# Patient Record
Sex: FEMALE | Race: WHITE | NOT HISPANIC OR LATINO | Employment: OTHER | URBAN - NONMETROPOLITAN AREA
[De-identification: names, ages, dates, MRNs, and addresses within clinical notes are randomized per-mention and may not be internally consistent; named-entity substitution may affect disease eponyms.]

---

## 2021-01-27 ENCOUNTER — OFFICE VISIT CONVERTED (OUTPATIENT)
Dept: FAMILY MEDICINE CLINIC | Age: 61
End: 2021-01-27
Attending: FAMILY MEDICINE

## 2021-05-11 ENCOUNTER — OFFICE VISIT CONVERTED (OUTPATIENT)
Dept: FAMILY MEDICINE CLINIC | Age: 61
End: 2021-05-11
Attending: FAMILY MEDICINE

## 2021-05-18 ENCOUNTER — HOSPITAL ENCOUNTER (OUTPATIENT)
Dept: OTHER | Facility: HOSPITAL | Age: 61
Discharge: HOME | End: 2021-05-18
Attending: FAMILY MEDICINE

## 2021-05-18 NOTE — PROGRESS NOTES
Radha Herr  1960     Office/Outpatient Visit    Visit Date: Tue, May 11, 2021 01:25 pm    Provider: Swetha Krishna MD (Assistant: Mely Henriquez,  )    Location: [facility]        Electronically signed by Swetha Krishna MD on  05/12/2021 11:20:49 AM                             Subjective:        CC: PT NOT TAKING ALBUTEROL OR METHYLPREDISONEestablish care, PHYSICAL    HPI:       worsening allergies, she is on zyrtec and it is no longer working, she has a runny nose, stuffy nose, PND and sinus pressure.  She has tried and failed singulair/allegra/claritan in past      she has chronic allergies with asthma, she is on breo and doing well overall, she c/o chronic cough and is worried something else is going on with her chest      elevated BP when she sees the doctor, BP at home have been good, she is trying to lose weight and is starting to exercise daily,  and has lost 9 #s         She is having muscle spasms down her entire spine, onset after covid, she has tried OTC lidoderm patches, heating  pad and motrin prn and these all help.  No paresthesias/radiculopathy or h/LE weakness that is new has been noted.  She has chronic L shoulder pain and L UE weakness associated with a L shoulder injury years ago        She s/o bladder leakage and incontinence that onset a few months ago,  she is s/p 2 c sections and hysterectomy and oophrectomy in past.  Urine is clear and no odor          Dx with encounter for general adult medical examination without abnormal findings; her last physical exam was >5 years ago.  She is status-post hysterectomy.  She performs breast self-exams occasionally.   Her last Pap smear was in 2016 and was normal.   Her last mammogram was in 2016, was normal, and breast reduction 2017.   She has never had a dexa scan. She underwent colonoscopy 10 years ago with normal results.   She's had vision screening done <6 months ago, this was abnormal, but is corrected with  glasses, and Dr Clay-starting to get cataracts.   Preventative Health updated today.  She is not current with her Td and influenza immunization.  Ms. Herr denies any history of abnormal Pap smears.  Tobacco: She has never smoked.      ROS:     CONSTITUTIONAL:  Negative for chills and fever.      EYES:  Negative for blurred vision and eye pain.      E/N/T:  Positive for nasal congestion and frequent rhinorrhea.   Negative for ear pain, sore throat or loss of taste and smell.      CARDIOVASCULAR:  Positive for rib and shoulder pain.   Negative for chest pain, dizziness, palpitations, pedal edema or tachycardia.      RESPIRATORY:  Positive for recent cough.   Negative for dyspnea or frequent wheezing.      GASTROINTESTINAL:  Negative for abdominal pain, constipation, diarrhea, hematochezia, melena, nausea and vomiting.      GENITOURINARY:  Negative for dysuria and hematuria.      MUSCULOSKELETAL:  Negative for myalgias.      INTEGUMENTARY/BREAST:  Negative for rash.      NEUROLOGICAL:  Negative for dizziness, headaches and weakness.      PSYCHIATRIC:  Positive for feelings of stress.   Negative for anxiety, depression, sleep disturbance or suicidal thoughts.          Past Medical History / Family History / Social History:         Last Reviewed on 2021 02:36 PM by Swetha Krishna    Past Medical History:                 PAST MEDICAL HISTORY         allergy induced asthma     Allergies     white coat HTN         GYNECOLOGICAL HISTORY:        Menopause at age hysterectomy .          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          Immunizations:     None        Allergies:     Last Reviewed on 1/27/2021 08:59 AM by Vidhya Hernandez    No Known Allergies.        Current Medications:     Last Reviewed on 5/11/2021 01:29 PM by Mely Henriquez    ALBUTEROL SULFATE HFA  108 (90 Base) MCG/ACT AERS     BENZONATATE  100 MG CAPS     Breo Ellipta 200-25 mcg/dose Inhalation Blister, With Inhalation Device [INHALE ONE DOSE BY MOUTH DAILY AT THE SAME TIME EACH DAY]    C-500 500 mg oral tablet,chewable    CoQ-10 100 mg oral capsule    Dialyvite Vitamin D 125 mcg (5,000 unit) oral capsule    vitamin B12-folic acid 500-400 mcg oral tablet        Objective:        Vitals:         Current: 5/11/2021 1:29:32 PM    Ht:  5 ft, 5.5 in;  Wt: 273.4 lbs;  BMI: 44.8T: 96.8 F (temporal);  BP: 150/81 mm Hg (right arm, sitting);  P: 77 bpm (left arm (BP Cuff), sitting)        Repeat:     1:37:48 PM  BP:   146/79mm Hg (right arm, sitting, HR: 79)     Exams:     PHYSICAL EXAM:     GENERAL: vital signs recorded - moderately obese;  well groomed;  no apparent distress;     EYES: PERRL, EOMI     E/N/T: EARS: both TMs are have fluid behind them;  NOSE:  normal nasal mucosa, septum, turbinates, and sinuses; OROPHARYNX: posterior pharynx, including tonsils, tongue, and uvula are normal;     NECK: thyroid is non-palpable; carotid exam is normal with good upstroke and no bruits; supple, FROM;     RESPIRATORY: CTA B WITHOUT WHEEZING/RALES OR RHONCHI, NORMAL RESP. EFFORT     CARDIOVASCULAR: regular rate and rhythm; normal S1, S2; no murmur, rub, or gallop; normal PMI;     NEUROLOGIC: mental status: oriented to person, place, and time;   GROSSLY INTACT     PSYCHIATRIC:  appropriate affect and demeanor; normal speech pattern; grossly normal memory;         Assessment:         J30.9   Allergic rhinitis, unspecified       J45.21   Mild intermittent asthma with (acute) exacerbation       R03.0   Elevated blood-pressure reading, without diagnosis of hypertension       N39.490   Overflow incontinence       M54.9   Dorsalgia, unspecified       R05   Cough       Z00.00   Encounter for general adult medical examination without abnormal findings       Z12.31   Encounter for screening mammogram for malignant neoplasm of breast       Z78.0   Asymptomatic menopausal state       Z12.11   Encounter for screening for malignant neoplasm of colon           ORDERS:         Meds Prescribed:       [Recorded] levocetirizine 5 mg oral tablet [take 1 tablet (5 mg) by oral route once daily at bedtime]       [Refilled] Breo Ellipta 200-25 mcg/dose Inhalation Blister, With Inhalation Device [INHALE ONE DOSE BY MOUTH DAILY AT THE SAME TIME EACH DAY], #90 (ninety) packets, Refills: 1 (one)       [Refilled] levocetirizine 5 mg oral tablet [take 1 tablet (5 mg) by oral route once daily at bedtime], #90 (ninety) tablets, Refills: 1 (one)         Radiology/Test Orders:       [id number]  DXA, bone density study, 1 or more sites; axial skeleton (eg hips, pelvis, spine)  (Send-Out)            12572  Screening digital breast tomosynthesis bi  (Send-Out)            65150  Radiologic exam chest 2 views  (Send-Out)              Lab Orders:       [id number]  [facility] PHYSICAL: CMP, CBC, TSH, LIPID: [id number], [id number], [id number], [id number]  (Send-Out)            17937  [facility] Urinalysis, automated, with micro  (Send-Out)              Procedures Ordered:       REFER  Referral to Specialist or Other Facility   (Send-Out)                      Plan:         Allergic rhinitis, unspecifiedwill change her to xyzal          Prescriptions:       [Recorded] levocetirizine 5 mg oral tablet [take 1 tablet (5 mg) by  oral route once daily at bedtime]       [Refilled] Breo Ellipta 200-25 mcg/dose Inhalation Blister, With Inhalation Device [INHALE ONE DOSE BY MOUTH DAILY AT THE SAME TIME EACH DAY], #90 (ninety) packets, Refills: 1 (one)       [Refilled] levocetirizine 5 mg oral tablet [take 1 tablet (5 mg) by oral route once daily at bedtime], #90 (ninety) tablets, Refills: 1 (one)         Mild intermittent asthma with (acute) exacerbationstable on meds, breo refilled        Elevated blood-pressure reading, without diagnosis of hypertensioncont to monitor home BP closely        Overflow incontinencewill check urine and have her cont to work on wt loss, start kegel exercise and bladder training    LABORATORY:  Labs ordered to be performed today include urinalysis with micro.            Orders:       [id number]  [facility] Urinalysis, automated, with micro  (Send-Out)              Dorsalgia, unspecifiedshe defers muscle relaxant, will cont motrin sparingly and heating pad        Coughchronic, prob allergies, will recheck CXR        RADIOLOGY:  I have ordered a chest x-ray (PA and lateral) to be done today.            Orders:       [id number]  Radiologic exam chest 2 views  (Send-Out)              Encounter for general adult medical examination without abnormal findingsSHE NEEDS HER MAMMO/DEXA/COLONOSCOPY, recommend covid/tdap/shingrix vaccinations, recommend yearly eye exam and flu vaccination, recommend diet and exercise for weight.    LABORATORY:  Labs ordered to be performed today include PHYSICAL PANEL; CMP, CBC, TSH, LIPID.            Orders:       [id number]  [facility] PHYSICAL: CMP, CBC, TSH, LIPID: [id number], [id number], [id number], [id number]  (Send-Out)            REFER  Referral to Specialist or Other Facility   (Send-Out)      Dr. Joya        Encounter for screening mammogram for malignant neoplasm of breast          Orders:       10404  Screening digital breast tomosynthesis bi  (Send-Out)              Asymptomatic menopausal state           Orders:       23606  DXA, bone density study, 1 or more sites; axial skeleton (eg hips, pelvis, spine)  (Send-Out)                  Charge Capture:         Primary Diagnosis:     J30.9  Allergic rhinitis, unspecified           Orders:      [id number]  Office/outpatient visit; established patient, level 4  (In-House)              J45.21  Mild intermittent asthma with (acute) exacerbation     R03.0  Elevated blood-pressure reading, without diagnosis of hypertension     N39.490  Overflow incontinence     M54.9  Dorsalgia, unspecified     R05  Cough     Z00.00  Encounter for general adult medical examination without abnormal findings           Orders:      13941  Preventive medicine, established patient, age 40-64 years  (In-House)              Z12.31  Encounter for screening mammogram for malignant neoplasm of breast     Z78.0  Asymptomatic menopausal state     Z12.11  Encounter for screening for malignant neoplasm of colon

## 2021-05-18 NOTE — PROGRESS NOTES
Radha Herr  1960     Office/Outpatient Visit    Visit Date: Wed, Jan 27, 2021 08:56 am    Provider: Swetha Krishna MD (Assistant: Vidhya Hernandez, )    Location: [facility]        Electronically signed by Swetha Krishna MD on  01/31/2021 02:01:48 PM                             Subjective:        CC: wheezing    HPI:           Ms. Herr presents with cough.  This has been a problem for the past one month.  It is associated with dyspnea, nasal congestion, post nasal drip and wheezing.  There are no associated symptoms of acid reflux or chest pain.  It seems worse with exertion, talking, and laughing.  The cough is lessened with cough syrup and antihistamines.  A respiratory infection preceeded the cough.  She does not use tobacco products and is not exposed to passive cigarette smoke.  Her medical history is remarkable for allergies and allergy induced asthma.  dx with covid one month ago Dec 17th-treated with muccinex and allergy meds, when she didnt get better (8 days later) she had a CXR showing pnumonia and she was treated with antibiotic and steroid at that time, she improved but cough came back and she was seen on Jan 19th and she was treated with albuterol and she is still wheezing and feels bad     ROS:     CONSTITUTIONAL:  Negative for chills and fever.      EYES:  Negative for blurred vision and eye pain.      E/N/T:  Positive for nasal congestion, frequent rhinorrhea and loss of taste and smell.   Negative for ear pain or sore throat.      CARDIOVASCULAR:  Positive for rib and shoulder pain.   Negative for chest pain, dizziness, palpitations, pedal edema or tachycardia.      RESPIRATORY:  Positive for recent cough and dyspnea.      GASTROINTESTINAL:  Negative for abdominal pain, constipation, diarrhea, nausea and vomiting.      GENITOURINARY:  Negative for dysuria and hematuria.      MUSCULOSKELETAL:  Negative for myalgias.      INTEGUMENTARY/BREAST:  Negative for rash.       NEUROLOGICAL:  Positive for headaches.   Negative for dizziness or weakness.          Past Medical History / Family History / Social History:         Last Reviewed on 2021 09:39 AM by Swetha Krishna    Past Medical History:                 PAST MEDICAL HISTORY         allergy induced asthma     Allergies     white coat HTN         GYNECOLOGICAL HISTORY:        Menopause at age hysterectomy 1990s.          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          Current Medications:     None        Objective:        Vitals:         Current: 2021 9:03:50 AM    Ht:  5 ft, 5.5 in;  Wt: 282.6 lbs;  BMI: 46.3T: 98.2 F (oral);  BP: 171/105 mm Hg (left arm, sitting);  P: 93 bpm (left arm (BP Cuff), sitting)O2 Sat: 97 % (room air)        Repeat:     9:5:16 AM  BP:   167/122mm Hg (right arm, sitting)     Exams:     PHYSICAL EXAM:     GENERAL: vital signs recorded - moderately obese;  well groomed;  no apparent distress;     EYES: PERRL, EOMI     E/N/T: EARS: both TMs are have fluid behind them;  NOSE:  normal nasal mucosa, septum, turbinates, and sinuses; OROPHARYNX: posterior pharynx, including tonsils, tongue, and uvula are normal;     NECK: thyroid is non-palpable; carotid exam is normal with  good upstroke and no bruits; supple, FROM;     RESPIRATORY: CTA B WITHOUT WHEEZING/RALES OR RHONCHI, NORMAL RESP. EFFORT     CARDIOVASCULAR: regular rate and rhythm; normal S1, S2; no murmur, rub, or gallop; normal PMI;     NEUROLOGIC: mental status: oriented to person, place, and time;  GROSSLY INTACT     PSYCHIATRIC:  appropriate affect and demeanor; normal speech pattern; grossly normal memory;         Assessment:         R05   Cough       U07.1   COVID-19       J45.21   Mild intermittent asthma with (acute) exacerbation       J30.9   Allergic rhinitis, unspecified       I10   Essential (primary) hypertension           ORDERS:         Meds Prescribed:       [Recorded] Breo Ellipta 200-25 mcg/dose Inhalation Blister, With Inhalation Device [inhale 1 puff by inhalation route once daily at the same time each day]       [Recorded] methylPREDNISolone 4 mg oral Tablet, Dose Pack [take by oral route as directed per package instructions]       [Refilled] Breo Ellipta 200-25 mcg/dose Inhalation Blister, With Inhalation Device [inhale 1 puff by inhalation route once daily at the same time each day], #28 (twenty eight) blisters, Refills: 0 (zero)       [Refilled] methylPREDNISolone 4 mg oral Tablet, Dose Pack [take by oral route as directed per package instructions], #21 (twenty one) tablets, Refills: 0 (zero)                 Plan:         Coughs/p covid, due to asthma exacerbation from her allergies and recent covid exposure        COVID-19resolved with residual persistent        Mild intermittent asthma with (acute) exacerbationongoing, will start her on breo and give her another steroid hetal          Prescriptions:       [Recorded] Breo Ellipta 200-25 mcg/dose Inhalation Blister, With Inhalation Device [inhale 1 puff by inhalation route once daily at the same time each day]       [Recorded] methylPREDNISolone 4 mg oral Tablet, Dose Pack [take by oral route as directed per package instructions]       [Refilled] Breo  Ellipta 200-25 mcg/dose Inhalation Blister, With Inhalation Device [inhale 1 puff by inhalation route once daily at the same time each day], #28 (twenty eight) blisters, Refills: 0 (zero)       [Refilled] methylPREDNISolone 4 mg oral Tablet, Dose Pack [take by oral route as directed per package instructions], #21 (twenty one) tablets, Refills: 0 (zero)         Allergic rhinitis, unspecifiedcont current meds, add muccinex and steroid        Essential (primary) hypertensionshe is to continue to check her BP at home, lst home bp was 120/70's            Charge Capture:         Primary Diagnosis:     R05  Cough           Orders:      57820  Office visit - new pt, level 3  (In-House)              U07.1  COVID-19     J45.21  Mild intermittent asthma with (acute) exacerbation     J30.9  Allergic rhinitis, unspecified     I10  Essential (primary) hypertension

## 2021-07-02 VITALS
TEMPERATURE: 98.2 F | OXYGEN SATURATION: 97 % | DIASTOLIC BLOOD PRESSURE: 122 MMHG | BODY MASS INDEX: 45.42 KG/M2 | SYSTOLIC BLOOD PRESSURE: 167 MMHG | HEIGHT: 66 IN | WEIGHT: 282.6 LBS | HEART RATE: 93 BPM

## 2021-07-02 VITALS
SYSTOLIC BLOOD PRESSURE: 146 MMHG | TEMPERATURE: 96.8 F | BODY MASS INDEX: 43.94 KG/M2 | WEIGHT: 273.4 LBS | HEIGHT: 66 IN | HEART RATE: 77 BPM | DIASTOLIC BLOOD PRESSURE: 79 MMHG

## 2021-07-07 ENCOUNTER — LAB (OUTPATIENT)
Dept: LAB | Facility: HOSPITAL | Age: 61
End: 2021-07-07

## 2021-07-07 ENCOUNTER — TRANSCRIBE ORDERS (OUTPATIENT)
Dept: ADMINISTRATIVE | Facility: HOSPITAL | Age: 61
End: 2021-07-07

## 2021-07-07 DIAGNOSIS — Z00.00 ROUTINE GENERAL MEDICAL EXAMINATION AT A HEALTH CARE FACILITY: ICD-10-CM

## 2021-07-07 DIAGNOSIS — N39.490 OVERFLOW INCONTINENCE: ICD-10-CM

## 2021-07-07 DIAGNOSIS — N39.490 OVERFLOW INCONTINENCE: Primary | ICD-10-CM

## 2021-07-07 LAB
ALBUMIN SERPL-MCNC: 4.5 G/DL (ref 3.5–5.2)
ALBUMIN/GLOB SERPL: 1.8 G/DL
ALP SERPL-CCNC: 64 U/L (ref 39–117)
ALT SERPL W P-5'-P-CCNC: 28 U/L (ref 1–33)
ANION GAP SERPL CALCULATED.3IONS-SCNC: 6.8 MMOL/L (ref 5–15)
AST SERPL-CCNC: 19 U/L (ref 1–32)
BASOPHILS # BLD MANUAL: 0.14 10*3/MM3 (ref 0–0.2)
BASOPHILS NFR BLD AUTO: 3 % (ref 0–1.5)
BILIRUB SERPL-MCNC: 0.5 MG/DL (ref 0–1.2)
BILIRUB UR QL STRIP: NEGATIVE
BUN SERPL-MCNC: 17 MG/DL (ref 6–20)
BUN/CREAT SERPL: 24.3 (ref 7–25)
CALCIUM SPEC-SCNC: 8.9 MG/DL (ref 8.6–10.5)
CHLORIDE SERPL-SCNC: 101 MMOL/L (ref 98–107)
CHOLEST SERPL-MCNC: 194 MG/DL (ref 0–200)
CLARITY UR: ABNORMAL
CO2 SERPL-SCNC: 28.2 MMOL/L (ref 22–29)
COLOR UR: YELLOW
CREAT SERPL-MCNC: 0.7 MG/DL (ref 0.57–1)
DEPRECATED RDW RBC AUTO: 43.3 FL (ref 37–54)
EOSINOPHIL # BLD MANUAL: 0.09 10*3/MM3 (ref 0–0.4)
EOSINOPHIL # BLD MANUAL: 0.23 10*3/MM3 (ref 0–0.4)
EOSINOPHIL NFR BLD MANUAL: 2 % (ref 0.3–6.2)
EOSINOPHIL NFR BLD MANUAL: 5 % (ref 0.3–6.2)
ERYTHROCYTE [DISTWIDTH] IN BLOOD BY AUTOMATED COUNT: 13.1 % (ref 12.3–15.4)
GFR SERPL CREATININE-BSD FRML MDRD: 86 ML/MIN/1.73
GLOBULIN UR ELPH-MCNC: 2.5 GM/DL
GLUCOSE SERPL-MCNC: 94 MG/DL (ref 65–99)
GLUCOSE UR STRIP-MCNC: NEGATIVE MG/DL
HCT VFR BLD AUTO: 46 % (ref 34–46.6)
HDLC SERPL-MCNC: 53 MG/DL (ref 40–60)
HGB BLD-MCNC: 14.8 G/DL (ref 12–15.9)
HGB UR QL STRIP.AUTO: NEGATIVE
HOLD SPECIMEN: NORMAL
KETONES UR QL STRIP: NEGATIVE
LDLC SERPL CALC-MCNC: 118 MG/DL (ref 0–100)
LDLC/HDLC SERPL: 2.18 {RATIO}
LEUKOCYTE ESTERASE UR QL STRIP.AUTO: NEGATIVE
LYMPHOCYTES # BLD MANUAL: 1.16 10*3/MM3 (ref 0.7–3.1)
LYMPHOCYTES # BLD MANUAL: 1.64 10*3/MM3 (ref 0.7–3.1)
LYMPHOCYTES NFR BLD MANUAL: 24 % (ref 19.6–45.3)
LYMPHOCYTES NFR BLD MANUAL: 3 % (ref 5–12)
LYMPHOCYTES NFR BLD MANUAL: 35.4 % (ref 19.6–45.3)
LYMPHOCYTES NFR BLD MANUAL: 5 % (ref 5–12)
MCH RBC QN AUTO: 28.8 PG (ref 26.6–33)
MCHC RBC AUTO-ENTMCNC: 32.2 G/DL (ref 31.5–35.7)
MCV RBC AUTO: 89.7 FL (ref 79–97)
MONOCYTES # BLD AUTO: 0.14 10*3/MM3 (ref 0.1–0.9)
MONOCYTES # BLD AUTO: 0.23 10*3/MM3 (ref 0.1–0.9)
NEUTROPHILS # BLD AUTO: 2.61 10*3/MM3 (ref 1.7–7)
NEUTROPHILS # BLD AUTO: 3 10*3/MM3 (ref 1.7–7)
NEUTROPHILS NFR BLD MANUAL: 56.6 % (ref 42.7–76)
NEUTROPHILS NFR BLD MANUAL: 65 % (ref 42.7–76)
NITRITE UR QL STRIP: NEGATIVE
PH UR STRIP.AUTO: 7 [PH] (ref 5–8)
PLAT MORPH BLD: NORMAL
PLAT MORPH BLD: NORMAL
PLATELET # BLD AUTO: 226 10*3/MM3 (ref 140–450)
PMV BLD AUTO: 11.9 FL (ref 6–12)
POTASSIUM SERPL-SCNC: 4 MMOL/L (ref 3.5–5.2)
PROT SERPL-MCNC: 7 G/DL (ref 6–8.5)
PROT UR QL STRIP: NEGATIVE
RBC # BLD AUTO: 5.13 10*6/MM3 (ref 3.77–5.28)
RBC MORPH BLD: NORMAL
RBC MORPH BLD: NORMAL
SODIUM SERPL-SCNC: 136 MMOL/L (ref 136–145)
SP GR UR STRIP: 1.02 (ref 1–1.03)
TRIGL SERPL-MCNC: 128 MG/DL (ref 0–150)
TSH SERPL DL<=0.05 MIU/L-ACNC: 1.52 UIU/ML (ref 0.27–4.2)
UROBILINOGEN UR QL STRIP: ABNORMAL
VARIANT LYMPHS NFR BLD MANUAL: 1 % (ref 0–5)
VLDLC SERPL-MCNC: 23 MG/DL (ref 5–40)
WBC # BLD AUTO: 4.62 10*3/MM3 (ref 3.4–10.8)
WBC MORPH BLD: NORMAL
WBC MORPH BLD: NORMAL

## 2021-07-07 PROCEDURE — 80053 COMPREHEN METABOLIC PANEL: CPT

## 2021-07-07 PROCEDURE — 80061 LIPID PANEL: CPT

## 2021-07-07 PROCEDURE — 85027 COMPLETE CBC AUTOMATED: CPT

## 2021-07-07 PROCEDURE — 36415 COLL VENOUS BLD VENIPUNCTURE: CPT

## 2021-07-07 PROCEDURE — 81003 URINALYSIS AUTO W/O SCOPE: CPT

## 2021-07-07 PROCEDURE — 84443 ASSAY THYROID STIM HORMONE: CPT

## 2021-07-07 PROCEDURE — 85007 BL SMEAR W/DIFF WBC COUNT: CPT

## 2021-07-15 ENCOUNTER — OFFICE VISIT (OUTPATIENT)
Dept: FAMILY MEDICINE CLINIC | Age: 61
End: 2021-07-15

## 2021-07-15 VITALS
SYSTOLIC BLOOD PRESSURE: 173 MMHG | BODY MASS INDEX: 40.21 KG/M2 | WEIGHT: 250.2 LBS | HEART RATE: 62 BPM | TEMPERATURE: 98.4 F | DIASTOLIC BLOOD PRESSURE: 76 MMHG | HEIGHT: 66 IN

## 2021-07-15 DIAGNOSIS — J30.1 ALLERGIC RHINITIS DUE TO POLLEN, UNSPECIFIED SEASONALITY: ICD-10-CM

## 2021-07-15 DIAGNOSIS — I10 WHITE COAT SYNDROME WITH DIAGNOSIS OF HYPERTENSION: ICD-10-CM

## 2021-07-15 DIAGNOSIS — N39.46 MIXED STRESS AND URGE URINARY INCONTINENCE: ICD-10-CM

## 2021-07-15 DIAGNOSIS — Z12.11 ENCOUNTER FOR SCREENING COLONOSCOPY: ICD-10-CM

## 2021-07-15 DIAGNOSIS — Z01.419 GYNECOLOGIC EXAM NORMAL: Primary | ICD-10-CM

## 2021-07-15 PROCEDURE — 99214 OFFICE O/P EST MOD 30 MIN: CPT | Performed by: FAMILY MEDICINE

## 2021-07-15 RX ORDER — TOLTERODINE 2 MG/1
2 CAPSULE, EXTENDED RELEASE ORAL DAILY
Qty: 90 CAPSULE | Refills: 1 | Status: SHIPPED | OUTPATIENT
Start: 2021-07-15 | End: 2022-11-21

## 2021-07-15 RX ORDER — LEVOCETIRIZINE DIHYDROCHLORIDE 5 MG/1
1 TABLET, FILM COATED ORAL DAILY
COMMUNITY
Start: 2021-05-12

## 2021-07-15 NOTE — PROGRESS NOTES
Radha Herr presents to [facility].    Chief Complaint: blood work reviewed.     Subjective       History of Present Illness:  HPI    ongoing allergies, she is on xyzal her allergies are not any better, she has a runny nose, stuffy nose, PND and sinus pressure.  She has tried and failed flonase/singulair/allegra/claritan in past        she has asthma, she is on breo and doing well overall, she c/o chronic cough and is worried something else is going on with her chest     ongoing high blood pressures and her home BP are normal           She s/o bladder leakage and incontinence and tried kegel exercises without improvement,onset a 5-6 months ago,  she is s/p 2 c sections and hysterectomy and oophrectomy in past.  Urine is clear and no odor        She is in today for her routine gyne exam, she is s/p total hysterectomy.  She no longer needs pap smears.  Mammogram and dexa are UTD.        ROS:     CONSTITUTIONAL:  Negative for chills and fever.      EYES:  Negative for blurred vision and eye pain.      E/N/T:  Positive for nasal congestion and frequent rhinorrhea.   Negative for ear pain, sore throat or loss of taste and smell.      CARDIOVASCULAR:    Negative for chest pain, dizziness, palpitations, pedal edema or tachycardia.      RESPIRATORY:  Positive for recent cough.   Negative for dyspnea or frequent wheezing.      GASTROINTESTINAL:  Negative for abdominal pain, constipation, diarrhea, hematochezia, melena, nausea and vomiting.      GENITOURINARY:  Negative for dysuria and hematuria.      MUSCULOSKELETAL:  Negative for myalgias.      INTEGUMENTARY/BREAST:  Negative for rash.      NEUROLOGICAL:  Negative for dizziness, headaches and weakness.      PSYCHIATRIC:  Positive for feelings of stress.   Negative for anxiety, depression, sleep disturbance or suicidal thoughts.    Review of Systems:  Review of Systems     Objective   Medical History:  Past Medical History:   • Allergic  "rhinitis   • Asymptomatic menopausal state   • Cough   • COVID-19   • Essential (primary) hypertension   • Mild intermittent asthma   • Other dorsalgia   • Overflow incontinence     Past Surgical History:   • BILATERAL BREAST REDUCTION   •  SECTION   • COLONOSCOPY   • HYSTERECTOMY   • LUMBAR DISCECTOMY FUSION INSTRUMENTATION   • NASAL SEPTUM SURGERY   • VARICOSE VEIN SURGERY      Family History   Problem Relation Age of Onset   • Cancer Mother    • COPD Mother    • Hypertension Mother    • Heart attack Father    • Coronary artery disease Father    • Hyperlipidemia Father    • Hypertension Father    • COPD Father    • Stroke Father    • Depression Father      Social History     Tobacco Use   • Smoking status: Never Smoker   • Smokeless tobacco: Never Used   Substance Use Topics   • Alcohol use: Not Currently       Health Maintenance Due   Topic Date Due   • COLORECTAL CANCER SCREENING  Never done   • ANNUAL PHYSICAL  Never done   • TDAP/TD VACCINES (1 - Tdap) Never done   • ZOSTER VACCINE (1 of 2) Never done   • COVID-19 Vaccine (2 - Pfizer 2-dose series) 2021   • HEPATITIS C SCREENING  Never done        Immunization History   Administered Date(s) Administered   • COVID-19 (PFIZER) 06/15/2021       No Known Allergies     Medications:  Current Outpatient Medications on File Prior to Visit   Medication Sig   • Breo Ellipta 200-25 MCG/INH inhaler Inhale 1 puff Daily.   • levocetirizine (XYZAL) 5 MG tablet Take 1 tablet by mouth Daily.     No current facility-administered medications on file prior to visit.       Vital Signs:   /76 (BP Location: Left arm, Patient Position: Sitting)   Pulse 62   Temp 98.4 °F (36.9 °C) (Oral)   Ht 166.4 cm (65.5\")   Wt 113 kg (250 lb 3.2 oz)   BMI 41.00 kg/m²       Physical Exam:  Physical Exam  Constitutional:       General: She is not in acute distress.     Appearance: She is normal weight. She is not ill-appearing.   HENT:      Head: Normocephalic.      Right " Ear: Tympanic membrane normal. There is no impacted cerumen.      Left Ear: Tympanic membrane normal. There is no impacted cerumen.      Mouth/Throat:      Mouth: Mucous membranes are moist.      Pharynx: Oropharynx is clear.   Eyes:      Extraocular Movements: Extraocular movements intact.      Pupils: Pupils are equal, round, and reactive to light.   Neck:      Vascular: No carotid bruit.   Cardiovascular:      Rate and Rhythm: Normal rate and regular rhythm.      Pulses: Normal pulses.      Heart sounds: No murmur heard.     Pulmonary:      Effort: Pulmonary effort is normal.      Breath sounds: No wheezing, rhonchi or rales.   Abdominal:      General: Bowel sounds are normal.      Palpations: Abdomen is soft.      Tenderness: There is no abdominal tenderness.      Hernia: There is no hernia in the left inguinal area or right inguinal area.   Genitourinary:     Labia:         Right: No rash.         Left: No rash.       Urethra: No prolapse.      Vagina: Normal.      Rectum: Guaiac result negative. No mass, tenderness, anal fissure, external hemorrhoid or internal hemorrhoid. Normal anal tone.      Comments: Pelvic exam neg for findings  Musculoskeletal:         General: No swelling. Normal range of motion.      Cervical back: No rigidity or tenderness.   Lymphadenopathy:      Cervical: No cervical adenopathy.   Skin:     General: Skin is warm and dry.   Neurological:      Mental Status: She is alert.      Gait: Gait normal.   Psychiatric:         Mood and Affect: Mood normal.         Behavior: Behavior normal.         Judgment: Judgment normal.         Result Review      The following data was reviewed by Swetha Krishna MD on 07/15/2021.  Lab Results   Component Value Date    WBC 4.62 07/07/2021    HGB 14.8 07/07/2021    HCT 46.0 07/07/2021    MCV 89.7 07/07/2021     07/07/2021     Lab Results   Component Value Date    GLUCOSE 94 07/07/2021    BUN 17 07/07/2021    CREATININE 0.70 07/07/2021     EGFRIFNONA 86 07/07/2021    BCR 24.3 07/07/2021    K 4.0 07/07/2021    CO2 28.2 07/07/2021    CALCIUM 8.9 07/07/2021    ALBUMIN 4.50 07/07/2021    AST 19 07/07/2021    ALT 28 07/07/2021     Lab Results   Component Value Date    CHOL 194 07/07/2021    TRIG 128 07/07/2021    HDL 53 07/07/2021     (H) 07/07/2021     Lab Results   Component Value Date    TSH 1.520 07/07/2021     No results found for: HGBA1C  No results found for: PSA                    Assessment and Plan:          Diagnoses and all orders for this visit:    1. Gynecologic exam normal (Primary)-normal GYN exam.    2. Encounter for screening colonoscopy-we will set her up for colonoscopy  -     Ambulatory Referral For Screening Colonoscopy    3. Allergic rhinitis due to pollen, unspecified seasonality-worse.  We will have her start using local honey.  She defers referral to allergy specialist for allergy testing at this time.  Continue Xyzal.    4. White coat syndrome with diagnosis of hypertension-she is to continue to check her blood pressures at home  5. Mixed stress and urge urinary incontinence we will start her on Detrol and she needs to continue Kegel exercises    Other orders  -     tolterodine LA (Detrol LA) 2 MG 24 hr capsule; Take 1 capsule by mouth Daily.  Dispense: 90 capsule; Refill: 1          Follow Up   No follow-ups on file.  Patient was given instructions and counseling regarding her condition or for health maintenance advice. Please see specific information pulled into the AVS if appropriate.

## 2021-08-09 ENCOUNTER — TELEPHONE (OUTPATIENT)
Dept: FAMILY MEDICINE CLINIC | Age: 61
End: 2021-08-09

## 2021-08-09 NOTE — TELEPHONE ENCOUNTER
Caller: Radha Herr    Relationship: Self    Best call back number: [phone]    What is the best time to reach you: ANYTIME    Who are you requesting to speak with (clinical staff, provider,  specific staff member): CODING PERSON     What was the call regarding:   PATIENT IS NEEDING TO SPEAK TO SOMEONE THAT DOES THE LAB CODING FROM July 7TH.  HAS QUESTIONS REGARDING HOW IT BILLWAS CODED.  HAS ALREADY SPOKE TO BILLING AND HER INSURANCE AND THEY SAID PATIENT NEEDS TO SPEAK TO CODING     Do you require a callback: YES

## 2021-08-21 NOTE — TELEPHONE ENCOUNTER
HUB TO READ    Patient called asking about how lab work was coded, directed her to the diagnostic center.   ADMIT

## 2021-10-20 ENCOUNTER — OFFICE VISIT (OUTPATIENT)
Dept: FAMILY MEDICINE CLINIC | Age: 61
End: 2021-10-20

## 2021-10-20 ENCOUNTER — LAB (OUTPATIENT)
Dept: LAB | Facility: HOSPITAL | Age: 61
End: 2021-10-20

## 2021-10-20 VITALS
HEART RATE: 75 BPM | DIASTOLIC BLOOD PRESSURE: 83 MMHG | BODY MASS INDEX: 37.77 KG/M2 | TEMPERATURE: 98.4 F | SYSTOLIC BLOOD PRESSURE: 149 MMHG | WEIGHT: 235 LBS | HEIGHT: 66 IN

## 2021-10-20 DIAGNOSIS — I83.813 VARICOSE VEINS OF BOTH LOWER EXTREMITIES WITH PAIN: Primary | ICD-10-CM

## 2021-10-20 DIAGNOSIS — R25.2 LEG CRAMPING: Primary | ICD-10-CM

## 2021-10-20 DIAGNOSIS — J30.1 ALLERGIC RHINITIS DUE TO POLLEN, UNSPECIFIED SEASONALITY: ICD-10-CM

## 2021-10-20 DIAGNOSIS — R25.2 LEG CRAMPING: ICD-10-CM

## 2021-10-20 LAB
25(OH)D3 SERPL-MCNC: 42.2 NG/ML
VIT B12 BLD-MCNC: 615 PG/ML (ref 211–946)

## 2021-10-20 PROCEDURE — 82607 VITAMIN B-12: CPT

## 2021-10-20 PROCEDURE — 99214 OFFICE O/P EST MOD 30 MIN: CPT | Performed by: NURSE PRACTITIONER

## 2021-10-20 PROCEDURE — 82306 VITAMIN D 25 HYDROXY: CPT

## 2021-10-20 PROCEDURE — 36415 COLL VENOUS BLD VENIPUNCTURE: CPT

## 2021-10-20 RX ORDER — FLUTICASONE PROPIONATE 50 MCG
1 SPRAY, SUSPENSION (ML) NASAL DAILY
Qty: 18 G | Refills: 3 | Status: SHIPPED | OUTPATIENT
Start: 2021-10-20 | End: 2021-11-11

## 2021-11-11 ENCOUNTER — HOSPITAL ENCOUNTER (OUTPATIENT)
Dept: GENERAL RADIOLOGY | Facility: HOSPITAL | Age: 61
Discharge: HOME | End: 2021-11-11
Admitting: FAMILY MEDICINE

## 2021-11-11 ENCOUNTER — OFFICE VISIT (OUTPATIENT)
Dept: FAMILY MEDICINE CLINIC | Age: 61
End: 2021-11-11

## 2021-11-11 VITALS
BODY MASS INDEX: 37.57 KG/M2 | SYSTOLIC BLOOD PRESSURE: 146 MMHG | DIASTOLIC BLOOD PRESSURE: 55 MMHG | HEART RATE: 78 BPM | TEMPERATURE: 98.1 F | WEIGHT: 233.8 LBS | HEIGHT: 66 IN

## 2021-11-11 DIAGNOSIS — M25.511 ACUTE PAIN OF RIGHT SHOULDER: ICD-10-CM

## 2021-11-11 DIAGNOSIS — I83.813 VARICOSE VEINS OF BOTH LOWER EXTREMITIES WITH PAIN: ICD-10-CM

## 2021-11-11 DIAGNOSIS — M25.511 ACUTE PAIN OF RIGHT SHOULDER: Primary | ICD-10-CM

## 2021-11-11 DIAGNOSIS — M79.10 MYALGIA: ICD-10-CM

## 2021-11-11 DIAGNOSIS — J30.1 ALLERGIC RHINITIS DUE TO POLLEN, UNSPECIFIED SEASONALITY: Primary | ICD-10-CM

## 2021-11-11 PROCEDURE — 99214 OFFICE O/P EST MOD 30 MIN: CPT | Performed by: FAMILY MEDICINE

## 2021-11-11 PROCEDURE — 73030 X-RAY EXAM OF SHOULDER: CPT

## 2021-11-11 RX ORDER — TRIAMCINOLONE ACETONIDE 55 UG/1
2 SPRAY, METERED NASAL DAILY
Qty: 1 EACH | Refills: 11
Start: 2021-11-11 | End: 2022-11-11

## 2021-11-11 RX ORDER — AZELASTINE 1 MG/ML
2 SPRAY, METERED NASAL DAILY
Qty: 30 ML | Refills: 5 | Status: SHIPPED | OUTPATIENT
Start: 2021-11-11 | End: 2023-02-20

## 2021-11-11 NOTE — PROGRESS NOTES
Radha Herr presents to [facility].    Chief Complaint:   L leg pain    Subjective       History of Present Illness:  HPI     She presents with LE pain, saw M Mouser and stated her pain started after having covid last December.  She was referred to vascular specialist who ran some tests and told her she has scarring in her L leg where she had vein stripping.  Today she presents with B LE pain and it is burning, worse with exercise-she is having muscle spasms.  Vascular is going to do some vein mapping. She is wearing compression stockings. No blood thinner. Venous duplex was normal.  She is on Magnesium that is helping with the muscle aches.     Chronic allergies with intermittent asthma, she is on xyzal which sometimes helps, she uses flonase and this causes nose bleeds, today she c/o a runny nose, stuffy nose, PND and sinus pressure.  She has tried and failed singulair/allegra/claritan in past    R arm pain, in posterior shoulder and radiating down her arm with burning and weakness. Onset 5 weeks ago with exercising with 1 # weights.  She is tender in her posterior shoulder.  Worse at night.  No neck pain         Review of Systems:  Review of Systems   Constitutional: Positive for fatigue. Negative for chills and fever.   HENT: Positive for congestion and sinus pressure. Negative for ear pain and sore throat.    Eyes: Negative for blurred vision and double vision.   Respiratory: Negative for cough, shortness of breath and wheezing.    Cardiovascular: Negative for chest pain, palpitations and leg swelling.   Gastrointestinal: Negative for abdominal pain, blood in stool, constipation, diarrhea, nausea and vomiting.   Skin: Negative for rash.   Neurological: Positive for headache. Negative for dizziness.   Psychiatric/Behavioral: Positive for sleep disturbance. Negative for depressed mood.        Objective   Medical History:  Past Medical History:   • Allergic rhinitis   •  "Asymptomatic menopausal state   • Cough   • COVID-19   • Essential (primary) hypertension   • Mild intermittent asthma   • Other dorsalgia   • Overflow incontinence     Past Surgical History:   • BILATERAL BREAST REDUCTION   •  SECTION   • COLONOSCOPY   • HYSTERECTOMY   • LUMBAR DISCECTOMY FUSION INSTRUMENTATION   • NASAL SEPTUM SURGERY   • VARICOSE VEIN SURGERY      Family History   Problem Relation Age of Onset   • Cancer Mother    • COPD Mother    • Hypertension Mother    • Heart attack Father    • Coronary artery disease Father    • Hyperlipidemia Father    • Hypertension Father    • COPD Father    • Stroke Father    • Depression Father      Social History     Tobacco Use   • Smoking status: Never Smoker   • Smokeless tobacco: Never Used   Substance Use Topics   • Alcohol use: Not Currently       Health Maintenance Due   Topic Date Due   • COLORECTAL CANCER SCREENING  Never done   • ANNUAL PHYSICAL  Never done   • TDAP/TD VACCINES (1 - Tdap) Never done   • ZOSTER VACCINE (1 of 2) Never done   • HEPATITIS C SCREENING  Never done   • INFLUENZA VACCINE  Never done        Immunization History   Administered Date(s) Administered   • COVID-19 (PFIZER) 06/15/2021       No Known Allergies     Medications:  Current Outpatient Medications on File Prior to Visit   Medication Sig   • Breo Ellipta 200-25 MCG/INH inhaler Inhale 1 puff Daily.   • [DISCONTINUED] fluticasone (FLONASE) 50 MCG/ACT nasal spray 1 spray into the nostril(s) as directed by provider Daily.   • levocetirizine (XYZAL) 5 MG tablet Take 1 tablet by mouth Daily.   • tolterodine LA (Detrol LA) 2 MG 24 hr capsule Take 1 capsule by mouth Daily.     No current facility-administered medications on file prior to visit.       Vital Signs:   /55 (BP Location: Right arm, Patient Position: Sitting, Cuff Size: Large Adult)   Pulse 78   Temp 98.1 °F (36.7 °C) (Oral)   Ht 166.4 cm (65.51\")   Wt 106 kg (233 lb 12.8 oz)   BMI 38.30 kg/m²       Physical " Exam:  Physical Exam  Vitals and nursing note reviewed.   Constitutional:       General: She is not in acute distress.     Appearance: Normal appearance. She is not ill-appearing, toxic-appearing or diaphoretic.   HENT:      Head: Normocephalic and atraumatic.      Right Ear: Tympanic membrane, ear canal and external ear normal.      Left Ear: Tympanic membrane, ear canal and external ear normal.      Nose: No congestion or rhinorrhea.      Mouth/Throat:      Pharynx: Oropharynx is clear. No oropharyngeal exudate or posterior oropharyngeal erythema.   Eyes:      Extraocular Movements: Extraocular movements intact.      Conjunctiva/sclera: Conjunctivae normal.   Cardiovascular:      Rate and Rhythm: Normal rate and regular rhythm.      Pulses:           Dorsalis pedis pulses are 2+ on the right side and 2+ on the left side.        Posterior tibial pulses are 2+ on the right side and 2+ on the left side.      Heart sounds: Normal heart sounds.      Comments: Very large varicose veins in LE B, toes-normal cap refill  Pulmonary:      Breath sounds: Normal breath sounds. No wheezing, rhonchi or rales.   Abdominal:      General: Abdomen is flat.      Palpations: Abdomen is soft.   Musculoskeletal:      Right shoulder: Tenderness, bony tenderness and crepitus present. No swelling, deformity, effusion or laceration. Decreased range of motion. Decreased strength. Normal pulse.      Cervical back: Neck supple. No rigidity.      Right lower leg: No edema.      Left lower leg: No edema.      Comments: Good strength with liftoff test and Quintero test, she is weak with empty can test.  She has pain with all 3 tests   Lymphadenopathy:      Cervical: No cervical adenopathy.   Skin:     General: Skin is warm and dry.   Neurological:      Mental Status: She is alert and oriented to person, place, and time.   Psychiatric:         Mood and Affect: Mood normal.         Behavior: Behavior normal.         Result Review      The following  data was reviewed by Swetha Krishna MD on 11/11/2021.  Lab Results   Component Value Date    WBC 4.62 07/07/2021    HGB 14.8 07/07/2021    HCT 46.0 07/07/2021    MCV 89.7 07/07/2021     07/07/2021     Lab Results   Component Value Date    GLUCOSE 94 07/07/2021    BUN 17 07/07/2021    CREATININE 0.70 07/07/2021    EGFRIFNONA 86 07/07/2021    BCR 24.3 07/07/2021    K 4.0 07/07/2021    CO2 28.2 07/07/2021    CALCIUM 8.9 07/07/2021    ALBUMIN 4.50 07/07/2021    AST 19 07/07/2021    ALT 28 07/07/2021     Lab Results   Component Value Date    CHOL 194 07/07/2021    TRIG 128 07/07/2021    HDL 53 07/07/2021     (H) 07/07/2021     Lab Results   Component Value Date    TSH 1.520 07/07/2021     No results found for: HGBA1C  No results found for: PSA                    Assessment and Plan:          Diagnoses and all orders for this visit:    1. Allergic rhinitis due to pollen, unspecified seasonality (Primary)  Comments:  Will change Flonase to Nasacort due to allergies.  She is to use this weekly.  We will add Astelin nasal spray  Orders:  -     Triamcinolone Acetonide (Nasacort Allergy 24HR) 55 MCG/ACT nasal inhaler; 2 sprays into the nostril(s) as directed by provider Daily.  Dispense: 1 each; Refill: 11  -     azelastine (ASTELIN) 0.1 % nasal spray; 2 sprays into the nostril(s) as directed by provider Daily. Use in each nostril as directed  Dispense: 30 mL; Refill: 5    2. Myalgia  Comments:  Continue close follow-up with vascular for her varicose veins.  We will rule out arterial disease with an JACQUELYN  Orders:  -     US Ankle / Brachial Indices Extremity Complete; Future    3. Acute pain of right shoulder  Comments:  X-ray right shoulder.  She may need Ortho eval  Orders:  -     XR Shoulder 2+ View Right; Future    4. Varicose veins of both lower extremities with pain  Comments:  check JACQUELYN, stop coQ10, f/u vascular          Follow Up   No follow-ups on file.

## 2021-11-15 DIAGNOSIS — M79.10 MYALGIA: ICD-10-CM

## 2021-11-15 DIAGNOSIS — I83.813 VARICOSE VEINS OF BOTH LOWER EXTREMITIES WITH PAIN: ICD-10-CM

## 2021-11-15 DIAGNOSIS — R25.2 LEG CRAMPING: Primary | ICD-10-CM

## 2021-11-16 ENCOUNTER — TELEPHONE (OUTPATIENT)
Dept: FAMILY MEDICINE CLINIC | Age: 61
End: 2021-11-16

## 2021-11-16 NOTE — TELEPHONE ENCOUNTER
Caller: Radha Herr    Relationship: Self    Best call back number: [phone]    What test was performed: X-RAY RIGHT SHOULD AND ELBOW    When was the test performed: 11/11/21    Where was the test performed: [facility]    Additional notes:MINDYEINSHANE STATED SHE HAS SEEN RESULTS ON MYCHART AND REFERRAL TO PHYSICAL THERAPY AND IS REQUESTING CALL TO DISCUSS DETAILS

## 2021-11-23 ENCOUNTER — HOSPITAL ENCOUNTER (OUTPATIENT)
Dept: CARDIOLOGY | Facility: HOSPITAL | Age: 61
Discharge: HOME | End: 2021-11-23
Admitting: FAMILY MEDICINE

## 2021-11-23 DIAGNOSIS — I83.813 VARICOSE VEINS OF BOTH LOWER EXTREMITIES WITH PAIN: ICD-10-CM

## 2021-11-23 DIAGNOSIS — R25.2 LEG CRAMPING: ICD-10-CM

## 2021-11-23 DIAGNOSIS — M79.10 MYALGIA: ICD-10-CM

## 2021-11-23 LAB
BH CV LOWER ARTERIAL LEFT ABI RATIO: 0.88
BH CV LOWER ARTERIAL LEFT DORSALIS PEDIS SYS MAX: 164 MMHG
BH CV LOWER ARTERIAL LEFT GREAT TOE SYS MAX: 169 MMHG
BH CV LOWER ARTERIAL LEFT POST TIBIAL SYS MAX: 174 MMHG
BH CV LOWER ARTERIAL LEFT TBI RATIO: 0.85
BH CV LOWER ARTERIAL RIGHT ABI RATIO: 0.94
BH CV LOWER ARTERIAL RIGHT DORSALIS PEDIS SYS MAX: 174 MMHG
BH CV LOWER ARTERIAL RIGHT GREAT TOE SYS MAX: 143 MMHG
BH CV LOWER ARTERIAL RIGHT POST TIBIAL SYS MAX: 186 MMHG
BH CV LOWER ARTERIAL RIGHT TBI RATIO: 0.72
MAXIMAL PREDICTED HEART RATE: 160 BPM
STRESS TARGET HR: 136 BPM
UPPER ARTERIAL LEFT ARM BRACHIAL SYS MAX: 198 MMHG
UPPER ARTERIAL RIGHT ARM BRACHIAL SYS MAX: 173 MMHG

## 2021-11-23 PROCEDURE — 93922 UPR/L XTREMITY ART 2 LEVELS: CPT | Performed by: SURGERY

## 2021-11-23 PROCEDURE — 93922 UPR/L XTREMITY ART 2 LEVELS: CPT

## 2021-11-24 NOTE — TELEPHONE ENCOUNTER
Caller: Radha Herr    Relationship: Self    Best call back number: [phone]       Requested Prescriptions:   Requested Prescriptions     Pending Prescriptions Disp Refills   • Breo Ellipta 200-25 MCG/INH inhaler       Sig: Inhale 1 puff Daily.        Pharmacy where request should be sent: [facility] - [facility] [address] [facility], [address] - [phone] ph - [phone] FX     Additional details provided by patient:   Does the patient have less than a 3 day supply:  [x] Yes  [] No    Cory Hurst Rep   11/24/21 11:54 EST

## 2021-12-03 ENCOUNTER — TREATMENT (OUTPATIENT)
Dept: PHYSICAL THERAPY | Facility: CLINIC | Age: 61
End: 2021-12-03

## 2021-12-03 DIAGNOSIS — M25.511 ACUTE PAIN OF RIGHT SHOULDER: Primary | ICD-10-CM

## 2021-12-03 DIAGNOSIS — M25.611 DECREASED RANGE OF MOTION OF RIGHT SHOULDER: ICD-10-CM

## 2021-12-03 DIAGNOSIS — M62.81 MUSCLE RIGHT ARM WEAKNESS: ICD-10-CM

## 2021-12-03 PROCEDURE — 97161 PT EVAL LOW COMPLEX 20 MIN: CPT | Performed by: PHYSICAL THERAPIST

## 2021-12-03 NOTE — PROGRESS NOTES
Physical Therapy Initial Evaluation and Plan of Care      Patient: Radha Herr   : 1960  Diagnosis/ICD-10 Code:  Acute pain of right shoulder [M25.511]  Referring practitioner: Swetha Krishna MD  Date of Initial Visit: 12/3/2021  Today's Date: 12/3/2021  Patient seen for 1 sessions         Visit Diagnoses:    ICD-10-CM ICD-9-CM   1. Acute pain of right shoulder  M25.511 719.41         Subjective Evaluation    History of Present Illness  Mechanism of injury: Pt states that the shoulder pain has been going on about 3 months ago.  She injured her R arm using her arm weights.  She felt like something tore and then it was burning and aching pain, progressively getting worse.  X-rays were taken showing light arthritis.  Pt states her pain is not getting any better.  She has a follow up with Dr. Krishna on Monday.  She is following up with PCP, may be getting an MRI referral.  The pain goes from the R shoulder to middle of forearm, occasional pain to the hands.  She notes dropping more things with her R hand.      Pain  Quality: radiating, sharp, burning and needle-like  Relieving factors: ice, heat and medications (creams)  Aggravating factors: overhead activity, repetitive movement, outstretched reach and sleeping  Progression: worsening    Social Support  Lives in: one-story house  Lives with: spouse    Hand dominance: right    Patient Goals  Patient goals for therapy: decreased pain, increased motion, increased strength and independence with ADLs/IADLs             Objective          Postural Observations  Correction of posture: makes symptoms better    Additional Postural Observation Details  Rounded, forward shoulders    Palpation     Right Tenderness of the infraspinatus, subscapularis, supraspinatus, teres major, teres minor and upper trapezius.     Neurological Testing     Additional Neurological Details  Radicular symptoms down the R arm into the forearm, occasionally down into the thenar  eminence    Active Range of Motion   Left Shoulder   Normal active range of motion    Right Shoulder   Flexion: 69 degrees with pain  Extension: 31 degrees with pain  Abduction: 60 degrees with pain  External rotation 0°: 20 degrees with pain  Internal rotation 0°: 60 degrees with pain    Passive Range of Motion   Left Shoulder   Normal passive range of motion    Right Shoulder   Flexion: 98 degrees with pain  Abduction: 90 degrees with pain  External rotation 0°: 30 degrees with pain    Strength/Myotome Testing     Left Shoulder   Normal muscle strength    Right Shoulder     Planes of Motion   Flexion: 3-   Abduction: 3-   External rotation at 0°: 3   Internal rotation at 0°: 3     Tests     Right Shoulder   Positive crossover, drop arm, empty can, full can, Hawkin's and lift-off.           Assessment & Plan     Assessment  Impairments: abnormal muscle firing, abnormal or restricted ROM, activity intolerance, impaired physical strength, lacks appropriate home exercise program and pain with function  Functional Limitations: carrying objects, lifting, sleeping, pulling, pushing, uncomfortable because of pain, reaching behind back, reaching overhead and unable to perform repetitive tasks  Assessment details: Pt presents with limitations, noted below, that impede her ability to perform ADLs, perform hobbies, housework duties,  strength, lifting light objects, and sleeping. The skills of a therapist will be required to safely and effectively implement the following treatment plan to restore maximal level of function.        Goals  Plan Goals: SHOULDER  PROBLEMS:     1. The patient has limited ROM of the right shoulder.    LTG 1: 12 weeks:  The patient will demonstrate 160 degrees of right shoulder flexion, 160 degrees of shoulder abduction, 80 degrees of shoulder external rotation, and 80 degrees of shoulder internal rotation to allow the patient to reach into upper kitchen cabinets and manipulate clothing behind  the back with greater ease.    STATUS:  New   STG 1a: 4 weeks:  The patient will demonstrate 140 degrees of right shoulder flexion, 120 degrees of shoulder abduction, 65 degrees of shoulder external rotation, and 65 degrees of shoulder internal rotation.    STATUS:  New   TREATMENT: Manual therapy, therapeutic exercise, home exercise instruction, and modalities as needed to include: electrical stimulation, ultrasound, moist heat, and ice.    2. The patient has limited strength of the right shoulder.   LTG 2: 12 weeks:  The patient will demonstrate 4/5 strength for right shoulder flexion, abduction, external rotation, and internal rotation in order to demonstrate improved shoulder stability.    STATUS:  New   STG 2a: 4 weeks:  The patient will demonstrate 3+ /5 strength for right shoulder flexion, abduction, external rotation, and internal rotation.    STATUS:  New   STG2b:  4 weeks:  The patient will be independent with home exercises.     STATUS:  New   TREATMENT: Manual therapy, therapeutic exercise, home exercise instruction, and modalities as needed to include: electrical stimulation, ultrasound, moist heat, and ice.     3. The patient complains of pain to the right shoulder.   LTG 3: 12 weeks:  The patient will report a pain rating of 3 /10 or better in order to improve sleep quality and tolerance to performance of activities of daily living.    STATUS:  New   STG 3a: 4 weeks:  The patient will report a pain rating of 5 /10 or better.     STATUS:  New   TREATMENT: Manual therapy, therapeutic exercise, home exercise instruction, and modalities as needed to include: electrical stimulation, ultrasound, moist heat, and ice.      Plan  Therapy options: will be seen for skilled therapy services  Planned modality interventions: cryotherapy, dry needling, electrical stimulation/Russian stimulation, TENS, thermotherapy (hydrocollator packs) and ultrasound  Planned therapy interventions: manual therapy, flexibility,  functional ROM exercises, home exercise program, therapeutic activities, stretching, strengthening, soft tissue mobilization, postural training and neuromuscular re-education  Duration in visits: 1  Duration in weeks: 12  Treatment plan discussed with: patient  Plan details: Create a HEP, review MD visit if any changes noted/needed, progress with pain control and R shoulder mobility, as tolerated, very light strengthening.           History # of Personal Factors and/or Comorbidities: MODERATE (1-2)  Examination of Body System(s): # of elements: HIGH (4+)  Clinical Presentation: STABLE   Clinical Decision Making: LOW       Timed:  Manual Therapy:         mins  18874;  Therapeutic Exercise:    3     mins  59604;     Neuromuscular Corina:        mins  17103;    Therapeutic Activity:          mins  64254;     Gait Training:           mins  24961;     Ultrasound:          mins  38461;    Canalith Repos           ___  mins  90917      Untimed:  Electrical Stimulation:         mins  61207 ( );  Mechanical Traction:         mins  79892;   Dry Needling:                     mins self pay  Low Eval:                      43     mins  05968;  Medium Eval:                     mins  03611;   High Eval:                          mins  71190       Timed Treatment:   3   mins   Total Treatment:     46   mins    PT SIGNATURE: Nadege Weinstein PT, DPT  [place] License: [id number]  Electronically signed by Nadege Weinstein PT, 12/03/21, 11:01 AM EST        Initial Certification    Certification Period: 12/3/2021 thru 3/2/2022  I certify that the therapy services are furnished while this patient is under my care.  The services outlined above are required by this patient, and will be reviewed every 90 days.     PHYSICIAN: Swetha Krishna MD       PHYSICIAN PRINT NAME: ______________________________________________      PHYSICIAN SIGNATURE: ______________________________________________         DATE:________________________________        Please sign  and return via fax to [phone].  Thank you, [facility].

## 2021-12-06 ENCOUNTER — OFFICE VISIT (OUTPATIENT)
Dept: FAMILY MEDICINE CLINIC | Age: 61
End: 2021-12-06

## 2021-12-06 VITALS
DIASTOLIC BLOOD PRESSURE: 81 MMHG | BODY MASS INDEX: 39.12 KG/M2 | WEIGHT: 243.4 LBS | OXYGEN SATURATION: 98 % | TEMPERATURE: 98.6 F | HEIGHT: 66 IN | SYSTOLIC BLOOD PRESSURE: 154 MMHG | HEART RATE: 75 BPM

## 2021-12-06 DIAGNOSIS — I10 ESSENTIAL HYPERTENSION: ICD-10-CM

## 2021-12-06 DIAGNOSIS — M25.511 ACUTE PAIN OF RIGHT SHOULDER: Primary | ICD-10-CM

## 2021-12-06 PROCEDURE — 99214 OFFICE O/P EST MOD 30 MIN: CPT | Performed by: FAMILY MEDICINE

## 2021-12-06 RX ORDER — DICLOFENAC SODIUM 75 MG/1
75 TABLET, DELAYED RELEASE ORAL 2 TIMES DAILY
Qty: 180 TABLET | Refills: 1 | Status: SHIPPED | OUTPATIENT
Start: 2021-12-06 | End: 2022-06-04

## 2021-12-06 NOTE — PROGRESS NOTES
Radha Herr presents to [facility].    Chief Complaint:  R shoulder pain    Subjective       History of Present Illness:  HPI  R shoulder and arm pain, in posterior shoulder and radiating down her arm with burning and weakness. Onset 9 weeks ago with exercising with 1 # weights.  She is tender in her posterior and now anterior shoulder.  Worse at night.  No neck pain.  She is not sleeping well. She went to PT and pain was excrutiating and she did not tolerate PT well-PT referred her back to me out of concern. Xray showed Mild degenerative changes along AC joint.  She has difficulty with abduction with decreased ROM      Review of Systems:  Review of Systems   Constitutional: Negative for chills, fatigue and fever.   HENT: Negative for ear pain, sinus pressure and sore throat.    Eyes: Negative for blurred vision and double vision.   Respiratory: Negative for cough, shortness of breath and wheezing.    Cardiovascular: Negative for chest pain and palpitations.   Gastrointestinal: Negative for abdominal pain, blood in stool, constipation, diarrhea, nausea and vomiting.   Skin: Negative for rash.   Neurological: Negative for dizziness and headache.   Psychiatric/Behavioral: Negative for depressed mood.        Objective   Medical History:  Past Medical History:   • Allergic rhinitis   • Asymptomatic menopausal state   • Cough   • COVID-19   • Essential (primary) hypertension   • Mild intermittent asthma    allergy induced, uses an inhaler 1x day   • Other dorsalgia   • Overflow incontinence     Past Surgical History:   • BILATERAL BREAST REDUCTION   •  SECTION   • COLONOSCOPY   • HYSTERECTOMY   • LUMBAR DISCECTOMY FUSION INSTRUMENTATION   • NASAL SEPTUM SURGERY   • VARICOSE VEIN SURGERY      Family History   Problem Relation Age of Onset   • Cancer Mother    • COPD Mother    • Hypertension Mother    • Heart attack Father    • Coronary artery disease Father    • Hyperlipidemia  "Father    • Hypertension Father    • COPD Father    • Stroke Father    • Depression Father      Social History     Tobacco Use   • Smoking status: Never Smoker   • Smokeless tobacco: Never Used   Substance Use Topics   • Alcohol use: Not Currently       Health Maintenance Due   Topic Date Due   • COLORECTAL CANCER SCREENING  Never done   • ANNUAL PHYSICAL  Never done   • TDAP/TD VACCINES (1 - Tdap) Never done   • ZOSTER VACCINE (1 of 2) Never done   • HEPATITIS C SCREENING  Never done   • INFLUENZA VACCINE  Never done        Immunization History   Administered Date(s) Administered   • COVID-19 (PFIZER) 06/15/2021       No Known Allergies     Medications:  Current Outpatient Medications on File Prior to Visit   Medication Sig   • azelastine (ASTELIN) 0.1 % nasal spray 2 sprays into the nostril(s) as directed by provider Daily. Use in each nostril as directed   • Breo Ellipta 200-25 MCG/INH inhaler Inhale 1 puff Daily.   • levocetirizine (XYZAL) 5 MG tablet Take 1 tablet by mouth Daily.   • tolterodine LA (Detrol LA) 2 MG 24 hr capsule Take 1 capsule by mouth Daily.   • Triamcinolone Acetonide (Nasacort Allergy 24HR) 55 MCG/ACT nasal inhaler 2 sprays into the nostril(s) as directed by provider Daily.     No current facility-administered medications on file prior to visit.       Vital Signs:   /81 (BP Location: Right arm, Patient Position: Sitting, Cuff Size: Large Adult)   Pulse 75   Temp 98.6 °F (37 °C) (Oral)   Ht 166.4 cm (65.51\")   Wt 110 kg (243 lb 6.4 oz)   SpO2 98% Comment: room air  BMI 39.88 kg/m²       Physical Exam:  Physical Exam  Vitals and nursing note reviewed.   Constitutional:       General: She is not in acute distress.     Appearance: Normal appearance. She is not ill-appearing, toxic-appearing or diaphoretic.   HENT:      Head: Normocephalic and atraumatic.      Right Ear: Tympanic membrane, ear canal and external ear normal.      Left Ear: Tympanic membrane, ear canal and external ear " normal.      Nose: No congestion or rhinorrhea.      Mouth/Throat:      Pharynx: Oropharynx is clear. No oropharyngeal exudate or posterior oropharyngeal erythema.   Eyes:      Extraocular Movements: Extraocular movements intact.      Conjunctiva/sclera: Conjunctivae normal.   Cardiovascular:      Rate and Rhythm: Normal rate and regular rhythm.      Heart sounds: Normal heart sounds.   Pulmonary:      Breath sounds: Normal breath sounds. No wheezing, rhonchi or rales.   Abdominal:      General: Abdomen is flat.      Palpations: Abdomen is soft.   Musculoskeletal:      Cervical back: Neck supple. No rigidity.   Lymphadenopathy:      Cervical: No cervical adenopathy.   Skin:     General: Skin is warm and dry.   Neurological:      Mental Status: She is alert and oriented to person, place, and time.   Psychiatric:         Mood and Affect: Mood normal.         Behavior: Behavior normal.         Result Review      The following data was reviewed by Swetha Krishna MD on 12/06/2021.  Lab Results   Component Value Date    WBC 4.62 07/07/2021    HGB 14.8 07/07/2021    HCT 46.0 07/07/2021    MCV 89.7 07/07/2021     07/07/2021     Lab Results   Component Value Date    GLUCOSE 94 07/07/2021    BUN 17 07/07/2021    CREATININE 0.70 07/07/2021    EGFRIFNONA 86 07/07/2021    BCR 24.3 07/07/2021    K 4.0 07/07/2021    CO2 28.2 07/07/2021    CALCIUM 8.9 07/07/2021    ALBUMIN 4.50 07/07/2021    AST 19 07/07/2021    ALT 28 07/07/2021     Lab Results   Component Value Date    CHOL 194 07/07/2021    TRIG 128 07/07/2021    HDL 53 07/07/2021     (H) 07/07/2021     Lab Results   Component Value Date    TSH 1.520 07/07/2021     No results found for: HGBA1C  No results found for: PSA                    Assessment and Plan:          Diagnoses and all orders for this visit:    1. Acute pain of right shoulder (Primary)  Comments:  stop PT, will MRI shoulder and get her in to ortho specialist, could be brachial plexus injury,  will MRI first  Orders:  -     MRI Shoulder Right Without Contrast; Future  -     diclofenac (VOLTAREN) 75 MG EC tablet; Take 1 tablet by mouth 2 (Two) Times a Day for 180 days.  Dispense: 180 tablet; Refill: 1  -     Ambulatory Referral to Orthopedic Surgery    2. Essential hypertension  Comments:  elevated, due to pain, will monitor BP outside of office          Follow Up   Return if symptoms worsen or fail to improve.

## 2021-12-07 ENCOUNTER — TREATMENT (OUTPATIENT)
Dept: PHYSICAL THERAPY | Facility: CLINIC | Age: 61
End: 2021-12-07

## 2021-12-07 DIAGNOSIS — M25.511 ACUTE PAIN OF RIGHT SHOULDER: Primary | ICD-10-CM

## 2021-12-07 DIAGNOSIS — M25.611 DECREASED RANGE OF MOTION OF RIGHT SHOULDER: ICD-10-CM

## 2021-12-07 DIAGNOSIS — M62.81 MUSCLE RIGHT ARM WEAKNESS: ICD-10-CM

## 2021-12-07 PROCEDURE — 97014 ELECTRIC STIMULATION THERAPY: CPT | Performed by: PHYSICAL THERAPIST

## 2021-12-07 PROCEDURE — 97530 THERAPEUTIC ACTIVITIES: CPT | Performed by: PHYSICAL THERAPIST

## 2021-12-07 NOTE — PROGRESS NOTES
Physical Therapy Treatment Note      Patient: Radha Herr   : 1960  Referring practitioner: Swetha Krishna MD  Date of Initial Visit: Type: THERAPY  Noted: 12/3/2021  Today's Date: 2021  Patient seen for 2 sessions           Visit Diagnoses:    ICD-10-CM ICD-9-CM   1. Acute pain of right shoulder  M25.511 719.41   2. Muscle right arm weakness  M62.81 728.87   3. Decreased range of motion of right shoulder  M25.611 719.51       Subjective Evaluation    History of Present Illness  Mechanism of injury: States having R shoulder and arm pain, R scapula, post acromial region, ant shoulder, ant capsule.   Relates pain, burning to R lat arm .  States has pain to initiate R shoulder flexion.     States using ice, heat, pain patches, was just prescribed diclofenac.       Pain  Current pain ratin  Location: R shoulder           Objective   See Exercise, Manual, and Modality Logs for complete treatment.       Assessment & Plan     Assessment    Assessment details: Pt had c/o pain with lap rubs, scapular retraction. TTP over post acromion, scapular region.   Today's goal was to decrease pain.     Tolerated IFC and heat  well, no c/o pain.     Discussed limiting use of R arm, perferred positoning, use of home modalities,precautions, pain parameters.     Goals  Plan Goals: SHOULDER  PROBLEMS:     1. The patient has limited ROM of the right shoulder.    LTG 1: 12 weeks:  The patient will demonstrate 160 degrees of right shoulder flexion, 160 degrees of shoulder abduction, 80 degrees of shoulder external rotation, and 80 degrees of shoulder internal rotation to allow the patient to reach into upper kitchen cabinets and manipulate clothing behind the back with greater ease.    STATUS:  New   STG 1a: 4 weeks:  The patient will demonstrate 140 degrees of right shoulder flexion, 120 degrees of shoulder abduction, 65 degrees of shoulder external rotation, and 65 degrees of shoulder internal  rotation.    STATUS:  New   TREATMENT: Manual therapy, therapeutic exercise, home exercise instruction, and modalities as needed to include: electrical stimulation, ultrasound, moist heat, and ice.    2. The patient has limited strength of the right shoulder.   LTG 2: 12 weeks:  The patient will demonstrate 4/5 strength for right shoulder flexion, abduction, external rotation, and internal rotation in order to demonstrate improved shoulder stability.    STATUS:  New   STG 2a: 4 weeks:  The patient will demonstrate 3+ /5 strength for right shoulder flexion, abduction, external rotation, and internal rotation.    STATUS:  New   STG2b:  4 weeks:  The patient will be independent with home exercises.     STATUS:  New   TREATMENT: Manual therapy, therapeutic exercise, home exercise instruction, and modalities as needed to include: electrical stimulation, ultrasound, moist heat, and ice.     3. The patient complains of pain to the right shoulder.   LTG 3: 12 weeks:  The patient will report a pain rating of 3 /10 or better in order to improve sleep quality and tolerance to performance of activities of daily living.    STATUS:  New   STG 3a: 4 weeks:  The patient will report a pain rating of 5 /10 or better.     STATUS:  New   TREATMENT: Manual therapy, therapeutic exercise, home exercise instruction, and modalities as needed to include: electrical stimulation, ultrasound, moist heat, and ice.        Plan  Plan details: Continue therapy as planned, progress as tolerated.                Timed:  Manual Therapy:         mins  24075;  Therapeutic Exercise:         mins  14426;     Therapeutic Activity:     10     mins  68456;    Neuromuscular Corina:        mins  71037;    Gait Training:           mins  77876;     Ultrasound:          mins  70991;    Mechanical Traction         mins   97349     Un-timed:   Electrical Stimulation    15     mins  08276 ()  Traction          mins  83577    Timed Treatment:   25   mins   Total  Treatment:     35   mins    Margy Rivera, PT            Margy Rivera, PT    Physical Therapist  [place] License [id number]    This document has been electronically signed by Margy Rivera, PT on December 7, 2021 16:24 EST

## 2021-12-14 ENCOUNTER — TREATMENT (OUTPATIENT)
Dept: PHYSICAL THERAPY | Facility: CLINIC | Age: 61
End: 2021-12-14

## 2021-12-14 DIAGNOSIS — M25.611 DECREASED RANGE OF MOTION OF RIGHT SHOULDER: ICD-10-CM

## 2021-12-14 DIAGNOSIS — M25.511 ACUTE PAIN OF RIGHT SHOULDER: Primary | ICD-10-CM

## 2021-12-14 DIAGNOSIS — M62.81 MUSCLE RIGHT ARM WEAKNESS: ICD-10-CM

## 2021-12-14 PROCEDURE — 97110 THERAPEUTIC EXERCISES: CPT | Performed by: PHYSICAL THERAPIST

## 2021-12-14 PROCEDURE — 97140 MANUAL THERAPY 1/> REGIONS: CPT | Performed by: PHYSICAL THERAPIST

## 2021-12-14 NOTE — PROGRESS NOTES
Physical Therapy Daily Treatment Note      Patient: Radha Herr   : 1960  Referring practitioner: Swetha Krishna MD  Date of Initial Visit: Type: THERAPY  Noted: 12/3/2021  Today's Date: 2021  Patient seen for 3 sessions           Subjective Evaluation    History of Present Illness    Subjective comment: 8-9/10       Objective   See Exercise, Manual, and Modality Logs for complete treatment.       Assessment & Plan     Assessment    Assessment details: Pt had a lot of tightness in the biceps and anterior deltoid which is tender upon palpation. Pt required cueing throughout the pulleys to not go beyond the painful movement. Pt was given ice and estim at the end of tx to decrease pain in the SHLD. Pt educated on heat and Ice and there uses.    Goals  Plan Goals: SHOULDER  PROBLEMS:     1. The patient has limited ROM of the right shoulder.    LTG 1: 12 weeks:  The patient will demonstrate 160 degrees of right shoulder flexion, 160 degrees of shoulder abduction, 80 degrees of shoulder external rotation, and 80 degrees of shoulder internal rotation to allow the patient to reach into upper kitchen cabinets and manipulate clothing behind the back with greater ease.    STATUS:  New   STG 1a: 4 weeks:  The patient will demonstrate 140 degrees of right shoulder flexion, 120 degrees of shoulder abduction, 65 degrees of shoulder external rotation, and 65 degrees of shoulder internal rotation.    STATUS:  New   TREATMENT: Manual therapy, therapeutic exercise, home exercise instruction, and modalities as needed to include: electrical stimulation, ultrasound, moist heat, and ice.    2. The patient has limited strength of the right shoulder.   LTG 2: 12 weeks:  The patient will demonstrate 4/5 strength for right shoulder flexion, abduction, external rotation, and internal rotation in order to demonstrate improved shoulder stability.    STATUS:  New   STG 2a: 4 weeks:  The patient will demonstrate 3+ /5  strength for right shoulder flexion, abduction, external rotation, and internal rotation.    STATUS:  New   STG2b:  4 weeks:  The patient will be independent with home exercises.     STATUS:  New   TREATMENT: Manual therapy, therapeutic exercise, home exercise instruction, and modalities as needed to include: electrical stimulation, ultrasound, moist heat, and ice.     3. The patient complains of pain to the right shoulder.   LTG 3: 12 weeks:  The patient will report a pain rating of 3 /10 or better in order to improve sleep quality and tolerance to performance of activities of daily living.    STATUS:  New   STG 3a: 4 weeks:  The patient will report a pain rating of 5 /10 or better.     STATUS:  New   TREATMENT: Manual therapy, therapeutic exercise, home exercise instruction, and modalities as needed to include: electrical stimulation, ultrasound, moist heat, and ice.        Plan  Plan details: Cont with the POC to progress pt toward goals.          Visit Diagnoses:    ICD-10-CM ICD-9-CM   1. Acute pain of right shoulder  M25.511 719.41   2. Muscle right arm weakness  M62.81 728.87   3. Decreased range of motion of right shoulder  M25.611 719.51       Progress per Plan of Care    Timed:    Therapeutic Exercise:    9     mins  24545;  Manual Therapy:    30     mins  59081;     Neuromuscular Corina:       mins  64675;    Therapeutic Activity:          mins  20091;     Gait Training:           mins  12593;     Ultrasound:          mins  22155;      Untimed:  Electrical Stimulation:    15     mins  69806 ( );  Mechanical Traction:         mins  30183;     Timed Treatment:   39   mins   Total Treatment:     60   mins      Bonnie Monroy PTA  Physical Therapist Assistant

## 2021-12-15 ENCOUNTER — HOSPITAL ENCOUNTER (OUTPATIENT)
Dept: MRI IMAGING | Facility: HOSPITAL | Age: 61
Discharge: HOME | End: 2021-12-15
Admitting: FAMILY MEDICINE

## 2021-12-15 DIAGNOSIS — M25.511 ACUTE PAIN OF RIGHT SHOULDER: ICD-10-CM

## 2021-12-15 PROCEDURE — 73221 MRI JOINT UPR EXTREM W/O DYE: CPT

## 2021-12-21 ENCOUNTER — OFFICE VISIT (OUTPATIENT)
Dept: ORTHOPEDIC SURGERY | Facility: CLINIC | Age: 61
End: 2021-12-21

## 2021-12-21 VITALS — WEIGHT: 235 LBS | HEIGHT: 65 IN | BODY MASS INDEX: 39.15 KG/M2

## 2021-12-21 DIAGNOSIS — M77.8 SHOULDER TENDONITIS, RIGHT: ICD-10-CM

## 2021-12-21 DIAGNOSIS — M75.101 TEAR OF RIGHT ROTATOR CUFF, UNSPECIFIED TEAR EXTENT, UNSPECIFIED WHETHER TRAUMATIC: Primary | ICD-10-CM

## 2021-12-21 PROCEDURE — 99203 OFFICE O/P NEW LOW 30 MIN: CPT | Performed by: ORTHOPAEDIC SURGERY

## 2021-12-21 PROCEDURE — 20610 DRAIN/INJ JOINT/BURSA W/O US: CPT | Performed by: ORTHOPAEDIC SURGERY

## 2021-12-21 RX ADMIN — BUPIVACAINE HYDROCHLORIDE 5 ML: 2.5 INJECTION, SOLUTION INFILTRATION; PERINEURAL at 09:03

## 2021-12-21 RX ADMIN — TRIAMCINOLONE ACETONIDE 40 MG: 40 INJECTION, SUSPENSION INTRA-ARTICULAR; INTRAMUSCULAR at 09:03

## 2021-12-21 NOTE — PROGRESS NOTES
"Chief Complaint  Pain of the Right Shoulder     Subjective      Radha Herr presents to [facility] for evaluation of the right shoulder. The patient reports she was doing exercises and felt a pulled in her shoulder about 3 months that has increased. She she reports the pain starts in the shoulder and radiates down past the elbow and to the posterior shoulder. She describes it as a burning, stabbing and aching pain. She denies numbness and tingling. She has tried home exercises, heat and ice for the shoulder. She reports pain at night. She recently had an MRI we reviewed that with her today.     No Known Allergies     Social History     Socioeconomic History   • Marital status:    • Number of children: 2   Tobacco Use   • Smoking status: Never Smoker   • Smokeless tobacco: Never Used   Vaping Use   • Vaping Use: Never used   Substance and Sexual Activity   • Alcohol use: Not Currently   • Drug use: Not Currently   • Sexual activity: Defer        Review of Systems     Objective   Vital Signs:   Ht 165.1 cm (65\")   Wt 107 kg (235 lb)   BMI 39.11 kg/m²       Physical Exam  Constitutional:       Appearance: Normal appearance. The patient is well-developed and normal weight.   HENT:      Head: Normocephalic.      Right Ear: Hearing and external ear normal.      Left Ear: Hearing and external ear normal.      Nose: Nose normal.   Eyes:      Conjunctiva/sclera: Conjunctivae normal.   Cardiovascular:      Rate and Rhythm: Normal rate.   Pulmonary:      Effort: Pulmonary effort is normal.      Breath sounds: No wheezing or rales.   Abdominal:      Palpations: Abdomen is soft.      Tenderness: There is no abdominal tenderness.   Musculoskeletal:      Cervical back: Normal range of motion.   Skin:     Findings: No rash.   Neurological:      Mental Status: The patient is alert and oriented to person, place, and time.   Psychiatric:         Mood and Affect: Mood and affect normal.         " Judgment: Judgment normal.       Ortho Exam      Right shoulder- tender to there anterior shoulder. Tender to the posterior shoulder. No skin discoloration, atrophy or swelling. Forward elevation 140. Abduction 95. External Rotation 70. Internal rotation 25. Pain with ROM. Internal rotation SI joint. 4+ infraspinatus, 5/5 subscapularis. Positive impingement signs. 4+ supraspinatus strength. Neurovascularly intact.     RIGHT SHOULDER  Date/Time: 12/21/2021 9:03 AM  Consent given by: patient  Site marked: site marked  Timeout: Immediately prior to procedure a time out was called to verify the correct patient, procedure, equipment, support staff and site/side marked as required   Supporting Documentation  Indications: pain   Procedure Details  Location: shoulder (RIGHT) -   Needle gauge: 21G.  Medications administered: 5 mL bupivacaine 0.25 %; 40 mg triamcinolone acetonide 40 MG/ML  Patient tolerance: patient tolerated the procedure well with no immediate complications              Imaging Results (Most Recent)     None           Result Review :       MRI Shoulder Right Without Contrast    Result Date: 12/15/2021  Narrative: PROCEDURE: MRI SHOULDER RIGHT WO CONTRAST  COMPARISON: BRITTNEY AMBROSE, XR SHOULDER 2+ VW RIGHT, 11/11/2021, 10:27.  INDICATIONS: Shoulder pain, bicep bursitis/tendinitis suspected, xray done  TECHNIQUE: A variety of imaging planes and parameters were utilized for visualization of suspected pathology.  Images were performed without contrast.   FINDINGS:  ROTATOR CUFF:  Moderate to advanced supraspinatus and anterior infraspinatus tendinopathy with mild partial-thickness bursal sided fraying proximal to the footplate.  The subscapularis and teres minor tendons are intact.  No significant muscular fatty atrophy or myositis.  LONG HEAD BICEPS TENDON: The intra-articular long head biceps tendon is normal in position, signal, and morphology.  ACROMIOCLAVICULAR ARCH:  Mild age-appropriate  hypertrophic degenerative changes at the acromioclavicular joint with associated mild mass effect on the supraspinatus space.  Type II acromion with mild lateral downsloping.  Mild abnormal fluid in the subacromial/subdeltoid bursa, mild bursitis.  GLENOHUMERAL JOINT:  The humeral head and glenoid are congruent.  Trace joint fluid.  Mild diffuse chondromalacia.  No discrete intra-articular body is identified.  Mild thickening of the joint capsule could reflect capsulitis.  BONES:  No fracture.  No concerning bone marrow lesion or marrow replacing process.  LABRUM: No evidence of acute labral tear.  No paralabral cyst.  SOFT TISSUES:  No solid soft tissue mass. CONTINUED ON NEXT PAGE...   CONCLUSION:  1. Moderate to advanced supraspinatus and anterior infraspinatus tendinopathy with mild partial-thickness bursal sided fraying proximal to the footplate. 2. Mild glenohumeral joint chondromalacia with mild thickening of the joint capsule that could reflect capsulitis. 3. Mild age-appropriate DJD at the AC joint and lateral acromial downsloping with mild subacromial/subdeltoid bursitis.      DWAIN MAXWELL MD       Electronically Signed and Approved By: DWAIN MAXWELL MD on 12/15/2021 at 11:20             Doppler Ankle Brachial Index Single Level CAR    Result Date: 11/23/2021  Narrative: · Right Conclusion: The right JACQUELYN is normal. Normal digital pressures. · Left Conclusion: The left JACQUELYN is normal. Normal digital pressures. · Normal single level arterial evaluation of both lower extremities.               Assessment and Plan     DX: Right rotator cuff tear, shoulder tendonitis     Discussed the treatment plan with the patient.  Plan for conservative treatment. Discussed the risks and benefits of a right shoulder steroid injection. The patient expressed understanding and wished to proceed. She tolerated the injection well. Order for physical therapy given today.     Call or return if worsening symptoms.    Follow Up     6  weeks       Patient was given instructions and counseling regarding her condition or for health maintenance advice. Please see specific information pulled into the AVS if appropriate.     Scribed for Norm Harrison MD by Naida Harley.  12/21/21   08:45 EST    I have personally performed the services described in this document as scribed by the above individual and it is both accurate and complete. Norm Harrison MD 12/22/21

## 2021-12-22 RX ORDER — BUPIVACAINE HYDROCHLORIDE 2.5 MG/ML
5 INJECTION, SOLUTION INFILTRATION; PERINEURAL
Status: COMPLETED | OUTPATIENT
Start: 2021-12-21 | End: 2021-12-21

## 2021-12-22 RX ORDER — TRIAMCINOLONE ACETONIDE 40 MG/ML
40 INJECTION, SUSPENSION INTRA-ARTICULAR; INTRAMUSCULAR
Status: COMPLETED | OUTPATIENT
Start: 2021-12-21 | End: 2021-12-21

## 2021-12-28 ENCOUNTER — TREATMENT (OUTPATIENT)
Dept: PHYSICAL THERAPY | Facility: CLINIC | Age: 61
End: 2021-12-28

## 2021-12-28 DIAGNOSIS — M25.611 DECREASED RANGE OF MOTION OF RIGHT SHOULDER: ICD-10-CM

## 2021-12-28 DIAGNOSIS — M25.511 ACUTE PAIN OF RIGHT SHOULDER: Primary | ICD-10-CM

## 2021-12-28 DIAGNOSIS — M62.81 MUSCLE RIGHT ARM WEAKNESS: ICD-10-CM

## 2021-12-28 PROCEDURE — 97112 NEUROMUSCULAR REEDUCATION: CPT | Performed by: PHYSICAL THERAPIST

## 2021-12-28 PROCEDURE — 97110 THERAPEUTIC EXERCISES: CPT | Performed by: PHYSICAL THERAPIST

## 2021-12-28 PROCEDURE — 97530 THERAPEUTIC ACTIVITIES: CPT | Performed by: PHYSICAL THERAPIST

## 2021-12-28 NOTE — PROGRESS NOTES
Physical Therapy Treatment Note      Patient: Radha Herr   : 1960  Referring practitioner: Swetha Krishna MD  Date of Initial Visit: Type: THERAPY  Noted: 12/3/2021  Today's Date: 2021  Patient seen for 4 sessions           Visit Diagnoses:    ICD-10-CM ICD-9-CM   1. Acute pain of right shoulder  M25.511 719.41   2. Muscle right arm weakness  M62.81 728.87   3. Decreased range of motion of right shoulder  M25.611 719.51       Subjective Evaluation    History of Present Illness  Mechanism of injury: States was injected, subacromially, 21, and pain has significantly decreased. No c/o pain today.  Still unable to find a comfortable position to sleep.     Relates pain if elevates > 90 and to internally rotate.     States doing pendulum at home.     Pain  No pain reported           Objective           General Comments     Shoulder Comments   R shoulder:  flexion : c/o pain at 90, but can go higher with increasing pain  scaption 85 as above  IR to lateral hip with c/o pain  ER able to reach behind head with min c/o pain      See Exercise, Manual, and Modality Logs for complete treatment.       Assessment & Plan     Assessment    Assessment details: Time was spent reviewing anatomy, pathology,  healing process, appropriate functional and therapy  progression expectations, precautions. Pt's questions were addressed as they arose.     Good performance of today's exercises, no c/o pain.    Pt was given a band and WHEP of today's exercises.  Pt related understanding of precautions, progression parameters. Questions were addressed as they arose.     Goals  Plan Goals: SHOULDER  PROBLEMS:     1. The patient has limited ROM of the right shoulder.    LTG 1: 12 weeks:  The patient will demonstrate 160 degrees of right shoulder flexion, 160 degrees of shoulder abduction, 80 degrees of shoulder external rotation, and 80 degrees of shoulder internal rotation to allow the patient to reach into upper  kitchen cabinets and manipulate clothing behind the back with greater ease.    STATUS:  New   STG 1a: 4 weeks:  The patient will demonstrate 140 degrees of right shoulder flexion, 120 degrees of shoulder abduction, 65 degrees of shoulder external rotation, and 65 degrees of shoulder internal rotation.    STATUS:  New   TREATMENT: Manual therapy, therapeutic exercise, home exercise instruction, and modalities as needed to include: electrical stimulation, ultrasound, moist heat, and ice.    2. The patient has limited strength of the right shoulder.   LTG 2: 12 weeks:  The patient will demonstrate 4/5 strength for right shoulder flexion, abduction, external rotation, and internal rotation in order to demonstrate improved shoulder stability.    STATUS:  New   STG 2a: 4 weeks:  The patient will demonstrate 3+ /5 strength for right shoulder flexion, abduction, external rotation, and internal rotation.    STATUS:  New   STG2b:  4 weeks:  The patient will be independent with home exercises.     STATUS:  ONGOING   TREATMENT: Manual therapy, therapeutic exercise, home exercise instruction, and modalities as needed to include: electrical stimulation, ultrasound, moist heat, and ice.     3. The patient complains of pain to the right shoulder.   LTG 3: 12 weeks:  The patient will report a pain rating of 3 /10 or better in order to improve sleep quality and tolerance to performance of activities of daily living.    STATUS:  New   STG 3a: 4 weeks:  The patient will report a pain rating of 5 /10 or better.     STATUS: MET   TREATMENT: Manual therapy, therapeutic exercise, home exercise instruction, and modalities as needed to include: electrical stimulation, ultrasound, moist heat, and ice.      Plan  Plan details: Will continue therapy in 2 weeks, allow pt to perform and progress HEP.  Pt agreed to this plan.     Will progress education, scapular stabilization to allow pain free UE elevation, rotation,  progress UE strengthening,  progress HEP.               Timed:  Manual Therapy:         mins  05828;  Therapeutic Exercise:    15     mins  46895;     Therapeutic Activity:     15     mins  86740;    Neuromuscular Corina:    15    mins  25335;    Gait Training:           mins  67659;     Ultrasound:          mins  73259;    Mechanical Traction         mins   13841     Un-timed:   Electrical Stimulation         mins  85009 ()  Traction          mins  25744    Timed Treatment:   45   mins   Total Treatment:     45   mins    Margy Rivera, ADELIA Rivera PT    Physical Therapist  [place] License [id number]    This document has been electronically signed by Margy Rivera PT on December 28, 2021 09:26 EST

## 2022-01-11 ENCOUNTER — TREATMENT (OUTPATIENT)
Dept: PHYSICAL THERAPY | Facility: CLINIC | Age: 62
End: 2022-01-11

## 2022-01-11 DIAGNOSIS — M62.81 MUSCLE RIGHT ARM WEAKNESS: ICD-10-CM

## 2022-01-11 DIAGNOSIS — M25.611 DECREASED RANGE OF MOTION OF RIGHT SHOULDER: ICD-10-CM

## 2022-01-11 DIAGNOSIS — M25.511 ACUTE PAIN OF RIGHT SHOULDER: Primary | ICD-10-CM

## 2022-01-11 PROCEDURE — 97530 THERAPEUTIC ACTIVITIES: CPT | Performed by: PHYSICAL THERAPIST

## 2022-01-11 PROCEDURE — 97110 THERAPEUTIC EXERCISES: CPT | Performed by: PHYSICAL THERAPIST

## 2022-01-11 PROCEDURE — 97112 NEUROMUSCULAR REEDUCATION: CPT | Performed by: PHYSICAL THERAPIST

## 2022-01-11 NOTE — PROGRESS NOTES
Physical Therapy Treatment Note      Patient: Radha Herr   : 1960  Referring practitioner: Swetha Krishna MD     Date of Initial Visit: Type: THERAPY  Noted: 12/3/2021  Today's Date: 2022  Patient seen for 5 sessions           Visit Diagnoses:    ICD-10-CM ICD-9-CM   1. Acute pain of right shoulder  M25.511 719.41   2. Muscle right arm weakness  M62.81 728.87   3. Decreased range of motion of right shoulder  M25.611 719.51       Subjective Questionnaire: QuickDASH: 0    Clinical Progress: improved    Home Program Compliance: Yes    Treatment has included: therapeutic exercise, neuromuscular re-education, manual therapy, therapeutic activity, electrical stimulation, moist heat, cryotherapy and education , HEP instruction    Progress toward previous goals: All Met     Recommendations: Continue with recommendations will put PT on hold for 30 days. If pt does not need to return, jossue D/C PT to HEP.     Timeframe: 1 month    Prognosis to achieve goals: good    Subjective Evaluation    History of Present Illness  Mechanism of injury: States the R shoulder feels great, between the exercises and the injections. States has no pain. Reports doing ADLs and home activities without c/o pain.     No c/o with HEP.     RTD 2/10/22.    Pain  No pain reported           Objective           General Comments     Shoulder Comments   Pt displays full AROM B shoulders, no c/o pain .     MMT: no c/o pain with testing, mild stabilization deficit     See Exercise, Manual, and Modality Logs for complete treatment.       Assessment & Plan     Assessment    Assessment details: Nice progression, no c/o pain today, function al ROM.     Good performance of today's exercises, no c/o pain.    Pt was given a band and WHEP of today's exercises.  Pt related understanding of precautions, progression parameters. Questions were addressed as they arose.         Goals  Plan Goals: SHOULDER  PROBLEMS:     1. The patient has limited  ROM of the right shoulder.    LTG 1: 12 weeks:  The patient will demonstrate 160 degrees of right shoulder flexion, 160 degrees of shoulder abduction, 80 degrees of shoulder external rotation, and 80 degrees of shoulder internal rotation to allow the patient to reach into upper kitchen cabinets and manipulate clothing behind the back with greater ease.    STATUS:  MET   STG 1a: 4 weeks:  The patient will demonstrate 140 degrees of right shoulder flexion, 120 degrees of shoulder abduction, 65 degrees of shoulder external rotation, and 65 degrees of shoulder internal rotation.    STATUS:  MET   TREATMENT: Manual therapy, therapeutic exercise, home exercise instruction, and modalities as needed to include: electrical stimulation, ultrasound, moist heat, and ice.    2. The patient has limited strength of the right shoulder.   LTG 2: 12 weeks:  The patient will demonstrate 4/5 strength for right shoulder flexion, abduction, external rotation, and internal rotation in order to demonstrate improved shoulder stability.    STATUS:  MET   STG 2a: 4 weeks:  The patient will demonstrate 3+ /5 strength for right shoulder flexion, abduction, external rotation, and internal rotation.    STATUS:  New   STG2b:  4 weeks:  The patient will be independent with home exercises.     STATUS:  ONGOING   TREATMENT: Manual therapy, therapeutic exercise, home exercise instruction, and modalities as needed to include: electrical stimulation, ultrasound, moist heat, and ice.     3. The patient complains of pain to the right shoulder.   LTG 3: 12 weeks:  The patient will report a pain rating of 3 /10 or better in order to improve sleep quality and tolerance to performance of activities of daily living.    STATUS:  MET   STG 3a: 4 weeks:  The patient will report a pain rating of 5 /10 or better.     STATUS: MET   TREATMENT: Manual therapy, therapeutic exercise, home exercise instruction, and modalities as needed to include: electrical stimulation,  ultrasound, moist heat, and ice.      Plan  Plan details: Will put PT on HOLD for the next 30 days to allow pt to continue performance of and progression of HEP.    If pt does not need to return in the next 30 days, will D/C PT to  HEP. Pt agrees with this plan and agrees to contact the facility and/or provider if questions or concerns arise.                Timed:  Manual Therapy:         mins  05155;  Therapeutic Exercise:     10    mins  31932;     Therapeutic Activity:     15     mins  99888;    Neuromuscular Corina:    15   mins  14335;    Gait Training:           mins  42180;     Ultrasound:          mins  07445;    Mechanical Traction         mins   00012     Un-timed:   Electrical Stimulation         mins  89218 ()  Traction          mins  05062    Timed Treatment:   40   mins   Total Treatment:     45   mins    Margy Rivera, PT            Margy Rivera, PT    Physical Therapist  [place] License [id number]    This document has been electronically signed by Margy Rivera PT on January 11, 2022 10:11 EST

## 2022-03-04 ENCOUNTER — OFFICE VISIT (OUTPATIENT)
Dept: ORTHOPEDIC SURGERY | Facility: CLINIC | Age: 62
End: 2022-03-04

## 2022-03-04 VITALS — WEIGHT: 263.4 LBS | HEIGHT: 65 IN | OXYGEN SATURATION: 97 % | BODY MASS INDEX: 43.89 KG/M2 | HEART RATE: 92 BPM

## 2022-03-04 DIAGNOSIS — M75.101 TEAR OF RIGHT ROTATOR CUFF, UNSPECIFIED TEAR EXTENT, UNSPECIFIED WHETHER TRAUMATIC: Primary | ICD-10-CM

## 2022-03-04 DIAGNOSIS — M77.8 SHOULDER TENDONITIS, RIGHT: ICD-10-CM

## 2022-03-04 PROCEDURE — 20610 DRAIN/INJ JOINT/BURSA W/O US: CPT | Performed by: PHYSICIAN ASSISTANT

## 2022-03-04 PROCEDURE — 99213 OFFICE O/P EST LOW 20 MIN: CPT | Performed by: PHYSICIAN ASSISTANT

## 2022-03-04 RX ORDER — BUPIVACAINE HYDROCHLORIDE 2.5 MG/ML
5 INJECTION, SOLUTION INFILTRATION; PERINEURAL
Status: COMPLETED | OUTPATIENT
Start: 2022-03-04 | End: 2022-03-04

## 2022-03-04 RX ORDER — TRIAMCINOLONE ACETONIDE 40 MG/ML
40 INJECTION, SUSPENSION INTRA-ARTICULAR; INTRAMUSCULAR
Status: COMPLETED | OUTPATIENT
Start: 2022-03-04 | End: 2022-03-04

## 2022-03-04 RX ADMIN — BUPIVACAINE HYDROCHLORIDE 5 ML: 2.5 INJECTION, SOLUTION INFILTRATION; PERINEURAL at 09:04

## 2022-03-04 RX ADMIN — TRIAMCINOLONE ACETONIDE 40 MG: 40 INJECTION, SUSPENSION INTRA-ARTICULAR; INTRAMUSCULAR at 09:04

## 2022-03-04 NOTE — PROGRESS NOTES
"Chief Complaint  Pain and Follow-up of the Right Shoulder    Subjective          Radha Herr is a 61 y.o. female  presents to [facility] for   History of Present Illness    Patient presents for follow-up evaluation right shoulder pain, right shoulder tendinitis/rotator cuff tear.  Patient states that she has been doing well she states the injection she received on 12/21/2021 from Dr. Harrison helped her shoulder she did physical therapy for several weeks she continues home exercises.  She states the injection wore off a few weeks ago she states she still has pain in the anterior lateral shoulder she describes it as a stabbing/aching pain.  She admits to pain at night.  Patient is requesting right shoulder steroid injection today.  No Known Allergies     Social History     Socioeconomic History   • Marital status:    • Number of children: 2   Tobacco Use   • Smoking status: Never Smoker   • Smokeless tobacco: Never Used   Vaping Use   • Vaping Use: Never used   Substance and Sexual Activity   • Alcohol use: Not Currently   • Drug use: Not Currently   • Sexual activity: Defer        REVIEW OF SYSTEMS    Constitutional: Denies fevers, chills, weight loss  Cardiovascular: Denies chest pain, shortness of breath  Skin: Denies rashes, acute skin changes  Neurologic: Denies headache, loss of consciousness  MSK: Right shoulder pain      Objective   Vital Signs:   Pulse 92   Ht 165.1 cm (65\")   Wt 119 kg (263 lb 6.4 oz)   SpO2 97%   BMI 43.83 kg/m²     Body mass index is 43.83 kg/m².    Physical Exam    Right shoulder: Tender to palpation of the anterior shoulder lateral shoulder.  Skin is intact, no erythema, ecchymosis, no swelling, no signs of infection, active forward elevation 160, active abduction 100, external Tatian with abduction 70, internal rotation 25, 4+ supraspinatus, 5/5 infraspinatus and subscapularis.    Large Joint Arthrocentesis: R subacromial bursa  Date/Time: " 3/4/2022 9:04 AM  Consent given by: patient  Site marked: site marked  Timeout: Immediately prior to procedure a time out was called to verify the correct patient, procedure, equipment, support staff and site/side marked as required   Supporting Documentation  Indications: pain   Procedure Details  Location: shoulder - R subacromial bursa  Preparation: Patient was prepped and draped in the usual sterile fashion  Needle gauge: 21 G.  Approach: posterior  Medications administered: 40 mg triamcinolone acetonide 40 MG/ML; 5 mL bupivacaine 0.25 %  Patient tolerance: patient tolerated the procedure well with no immediate complications          Imaging Results (Most Recent)     None           Result Review :   The following data was reviewed by: JENNIFER So on 03/04/2022:               Assessment and Plan    Diagnoses and all orders for this visit:    1. Tear of right rotator cuff, unspecified tear extent, unspecified whether traumatic (Primary)    2. Shoulder tendonitis, right    Other orders  -     Large Joint Arthrocentesis: R subacromial bursa        Discussed diagnosis and treatment options with the patient patient elected to have right shoulder steroid injection today which she tolerated well, she will continue home exercises, follow-up in 3 months for reevaluation.  Patient agreed.    Call or return if worsening symptoms.    Follow Up   Return in about 3 months (around 6/4/2022).  Patient was given instructions and counseling regarding her condition or for health maintenance advice. Please see specific information pulled into the AVS if appropriate.

## 2022-03-07 DIAGNOSIS — Z12.31 SCREENING MAMMOGRAM, ENCOUNTER FOR: Primary | ICD-10-CM

## 2022-05-11 ENCOUNTER — TELEPHONE (OUTPATIENT)
Dept: ORTHOPEDIC SURGERY | Facility: CLINIC | Age: 62
End: 2022-05-11

## 2022-05-23 ENCOUNTER — TELEPHONE (OUTPATIENT)
Dept: FAMILY MEDICINE CLINIC | Age: 62
End: 2022-05-23

## 2022-05-23 NOTE — TELEPHONE ENCOUNTER
----- Message from Beverly Zhang LPN sent at 5/23/2022  8:33 AM EDT -----      ----- Message -----  From: SYSTEM  Sent: 5/23/2022   1:23 AM EDT  To: [facility] [place] [facility] [place]

## 2022-05-23 NOTE — TELEPHONE ENCOUNTER
Called patient, no answer, unable to leave voicemail per verbal release. Called about overdue mammogram order that needs to be scheduled. Patient is due for for mammogram after 5/18. ros

## 2022-05-23 NOTE — TELEPHONE ENCOUNTER
Caller: Radha Herr    Relationship to patient: Self    Best call back number: [phone] OKAY TO LEAVE MESSAGE PER PATIENT    Patient is needing: PATIENT CALLED IN RETURNING [facility]'S CALL. PATIENT SAID SHE HAD SURGERY TODAY AND MIGHT NOT  TODAY. PLEASE CALL PATIENT AND ADVISE.

## 2022-07-05 ENCOUNTER — TELEPHONE (OUTPATIENT)
Dept: FAMILY MEDICINE CLINIC | Age: 62
End: 2022-07-05

## 2022-07-05 NOTE — TELEPHONE ENCOUNTER
----- Message from Beverly Zhang LPN sent at 5/23/2022  8:33 AM EDT -----      ----- Message -----  From: SYSTEM  Sent: 5/23/2022   1:23 AM EDT  To: [facility] [place] [facility] [place]

## 2022-07-05 NOTE — TELEPHONE ENCOUNTER
Called patient no answer unable to leave voicemail per verbal release.     Okay to inform patient she needs to schedule mammogram. ros

## 2022-08-05 ENCOUNTER — TELEPHONE (OUTPATIENT)
Dept: FAMILY MEDICINE CLINIC | Age: 62
End: 2022-08-05

## 2022-08-31 ENCOUNTER — OFFICE VISIT (OUTPATIENT)
Dept: FAMILY MEDICINE CLINIC | Age: 62
End: 2022-08-31

## 2022-08-31 VITALS
HEIGHT: 65 IN | HEART RATE: 77 BPM | WEIGHT: 264.6 LBS | OXYGEN SATURATION: 96 % | BODY MASS INDEX: 44.08 KG/M2 | TEMPERATURE: 98.1 F

## 2022-08-31 DIAGNOSIS — L25.5 CONTACT DERMATITIS DUE TO PLANTS, EXCEPT FOOD, UNSPECIFIED CONTACT DERMATITIS TYPE: Primary | ICD-10-CM

## 2022-08-31 PROCEDURE — 99213 OFFICE O/P EST LOW 20 MIN: CPT | Performed by: PHYSICIAN ASSISTANT

## 2022-08-31 PROCEDURE — 96372 THER/PROPH/DIAG INJ SC/IM: CPT | Performed by: PHYSICIAN ASSISTANT

## 2022-08-31 RX ORDER — TRIAMCINOLONE ACETONIDE 40 MG/ML
40 INJECTION, SUSPENSION INTRA-ARTICULAR; INTRAMUSCULAR ONCE
Status: COMPLETED | OUTPATIENT
Start: 2022-08-31 | End: 2022-08-31

## 2022-08-31 RX ORDER — DICLOFENAC SODIUM 75 MG/1
TABLET, DELAYED RELEASE ORAL
COMMUNITY
Start: 2022-06-07 | End: 2022-11-21

## 2022-08-31 RX ADMIN — TRIAMCINOLONE ACETONIDE 40 MG: 40 INJECTION, SUSPENSION INTRA-ARTICULAR; INTRAMUSCULAR at 14:46

## 2022-08-31 NOTE — PROGRESS NOTES
"Subjective     CHIEF COMPLAINT    Chief Complaint   Patient presents with   • Poison Ivy     Ongoing for a week. Pt is broke out on both arms, ankles, thighs, chest, and stomach.             This is a 17-year-old female presenting to the clinic complaining of \"poison ivy\" for the last week or so.  She was helping her neighbor outside and states she saw poison ivy and thought she had avoided it but apparently did not.  He has been using over-the-counter medications such as IvyRest and calamine lotion but she is concerned because is continuing to spread and continues to be very itchy.            Review of Systems   Constitutional: Negative for chills and fever.   Respiratory: Negative for shortness of breath and wheezing.    Gastrointestinal: Negative for nausea and vomiting.   Skin: Positive for rash.            Past Medical History:   Diagnosis Date   • Allergic rhinitis    • Asymptomatic menopausal state    • Cough    • COVID-19    • Essential (primary) hypertension    • Mild intermittent asthma     allergy induced, uses an inhaler 1x day   • Other dorsalgia    • Overflow incontinence             Past Surgical History:   Procedure Laterality Date   • BILATERAL BREAST REDUCTION  2017   •  SECTION     • COLONOSCOPY     • HYSTERECTOMY     • LUMBAR DISCECTOMY FUSION INSTRUMENTATION     • NASAL SEPTUM SURGERY     • VARICOSE VEIN SURGERY              Family History   Problem Relation Age of Onset   • Cancer Mother    • COPD Mother    • Hypertension Mother    • Heart attack Father    • Coronary artery disease Father    • Hyperlipidemia Father    • Hypertension Father    • COPD Father    • Stroke Father    • Depression Father             Social History     Socioeconomic History   • Marital status:    • Number of children: 2   Tobacco Use   • Smoking status: Never Smoker   • Smokeless tobacco: Never Used   Vaping Use   • Vaping Use: Never used   Substance and Sexual Activity   • Alcohol use: Not Currently   • " "Drug use: Not Currently   • Sexual activity: Defer            No Known Allergies         Current Outpatient Medications on File Prior to Visit   Medication Sig Dispense Refill   • azelastine (ASTELIN) 0.1 % nasal spray 2 sprays into the nostril(s) as directed by provider Daily. Use in each nostril as directed 30 mL 5   • Breo Ellipta 200-25 MCG/INH inhaler Inhale 1 puff Daily. 1 each 2   • Triamcinolone Acetonide (Nasacort Allergy 24HR) 55 MCG/ACT nasal inhaler 2 sprays into the nostril(s) as directed by provider Daily. 1 each 11   • diclofenac (VOLTAREN) 75 MG EC tablet      • levocetirizine (XYZAL) 5 MG tablet Take 1 tablet by mouth Daily.     • tolterodine LA (Detrol LA) 2 MG 24 hr capsule Take 1 capsule by mouth Daily. 90 capsule 1     No current facility-administered medications on file prior to visit.            Pulse 77   Temp 98.1 °F (36.7 °C) (Oral)   Ht 165.1 cm (65\")   Wt 120 kg (264 lb 9.6 oz)   SpO2 96% Comment: room air  BMI 44.03 kg/m²          Objective     Physical Exam  Vitals and nursing note reviewed.   Constitutional:       General: She is not in acute distress.     Appearance: Normal appearance.   Cardiovascular:      Rate and Rhythm: Normal rate and regular rhythm.      Heart sounds: Normal heart sounds.   Pulmonary:      Effort: Pulmonary effort is normal. No respiratory distress.      Breath sounds: Normal breath sounds.   Skin:     General: Skin is warm and dry.      Findings: Rash (vesicular patches on bilateral legs, elbows, wrists) present.   Neurological:      Mental Status: She is alert and oriented to person, place, and time.   Psychiatric:         Mood and Affect: Mood normal.         Behavior: Behavior normal.              Procedures                    Lab Results (last 24 hours)     ** No results found for the last 24 hours. **                No Radiology Exams Resulted Within Past 24 Hours                    Diagnoses and all orders for this visit:    1. Contact dermatitis " due to plants, except food, unspecified contact dermatitis type (Primary)  -     triamcinolone acetonide (KENALOG-40) injection 40 mg  -     triamcinolone (KENALOG) 0.1 % ointment; Apply 1 application topically to the appropriate area as directed 2 (Two) Times a Day.  Dispense: 30 g; Refill: 1                           FOR FULL DISCHARGE INSTRUCTIONS/COMMENTS/HANDOUTS please see the AVS

## 2022-11-09 ENCOUNTER — TELEPHONE (OUTPATIENT)
Dept: FAMILY MEDICINE CLINIC | Age: 62
End: 2022-11-09

## 2022-11-09 NOTE — TELEPHONE ENCOUNTER
Caller: Radha Herr    Relationship: Self    Best call back number: [phone]    What orders are you requesting (i.e. lab or imaging): BLOOD WORK AND URINE SAMPLE     In what timeframe would the patient need to come in: ASAP. PHYSICAL IS SCHEDULED ON 11/16/22    Where will you receive your lab/imaging services: [facility]     Additional notes: PATIENT STATED THAT SHE WAS NEEDING TO BE NOTIFIED WHEN ORDERED BY TEXT, E-MAIL THAT IS ON FILE OR PHONE CALL.

## 2022-11-10 ENCOUNTER — TELEPHONE (OUTPATIENT)
Dept: FAMILY MEDICINE CLINIC | Age: 62
End: 2022-11-10

## 2022-11-10 DIAGNOSIS — Z13.6 ENCOUNTER FOR SCREENING FOR CARDIOVASCULAR DISORDERS: Primary | ICD-10-CM

## 2022-11-10 NOTE — TELEPHONE ENCOUNTER
I would not check a urine unless she is symptomatic which means dysuria, hematuria, malodorous urine, unusual color to the urine.  Dr. Krishna

## 2022-11-10 NOTE — TELEPHONE ENCOUNTER
I called pt to ask about her request for urine orders she said she is not having any urinary symptoms she just knows that DR Dougherty checked it last time.

## 2022-11-10 NOTE — TELEPHONE ENCOUNTER
Caller: Radha Herr    Relationship to patient: Self    Best call back number: [phone]    Patient is needing: PATIENT CALLED IN AND SAID THAT SHE WOULD LIKE TO HAVE LAB ORDERS AND A URINE TEST PLACED AHEAD OF HER APPOINTMENT 11/16/2022. SHE IS HOPING TO HAVE THOSE DRAWN TOMORROW IF POSSIBLE

## 2022-11-21 ENCOUNTER — OFFICE VISIT (OUTPATIENT)
Dept: FAMILY MEDICINE CLINIC | Age: 62
End: 2022-11-21

## 2022-11-21 VITALS
SYSTOLIC BLOOD PRESSURE: 185 MMHG | BODY MASS INDEX: 45.52 KG/M2 | WEIGHT: 273.2 LBS | DIASTOLIC BLOOD PRESSURE: 100 MMHG | TEMPERATURE: 98.6 F | HEART RATE: 91 BPM | OXYGEN SATURATION: 94 % | HEIGHT: 65 IN

## 2022-11-21 DIAGNOSIS — J01.00 ACUTE NON-RECURRENT MAXILLARY SINUSITIS: Primary | ICD-10-CM

## 2022-11-21 DIAGNOSIS — R05.1 ACUTE COUGH: ICD-10-CM

## 2022-11-21 DIAGNOSIS — R03.0 ELEVATED BLOOD PRESSURE READING: ICD-10-CM

## 2022-11-21 LAB
EXPIRATION DATE: NORMAL
FLUAV AG UPPER RESP QL IA.RAPID: NOT DETECTED
FLUBV AG UPPER RESP QL IA.RAPID: NOT DETECTED
INTERNAL CONTROL: NORMAL
Lab: NORMAL
SARS-COV-2 AG UPPER RESP QL IA.RAPID: NOT DETECTED

## 2022-11-21 PROCEDURE — 87428 SARSCOV & INF VIR A&B AG IA: CPT | Performed by: PHYSICIAN ASSISTANT

## 2022-11-21 PROCEDURE — 99213 OFFICE O/P EST LOW 20 MIN: CPT | Performed by: PHYSICIAN ASSISTANT

## 2022-11-21 RX ORDER — BENZONATATE 200 MG/1
200 CAPSULE ORAL 3 TIMES DAILY PRN
Qty: 21 CAPSULE | Refills: 0 | Status: SHIPPED | OUTPATIENT
Start: 2022-11-21 | End: 2022-11-21

## 2022-11-21 RX ORDER — CEFDINIR 300 MG/1
300 CAPSULE ORAL 2 TIMES DAILY
Qty: 20 CAPSULE | Refills: 0 | Status: SHIPPED | OUTPATIENT
Start: 2022-11-21 | End: 2022-12-01

## 2022-11-21 RX ORDER — BENZONATATE 100 MG/1
200 CAPSULE ORAL 3 TIMES DAILY PRN
Qty: 60 CAPSULE | Refills: 0 | Status: SHIPPED | OUTPATIENT
Start: 2022-11-21 | End: 2023-03-13

## 2022-11-21 NOTE — PROGRESS NOTES
Subjective     CHIEF COMPLAINT    Chief Complaint   Patient presents with   • Ear Fullness     (B) Ongoing for about a week.    • Nasal Congestion   • Sinusitis     Pt states she has been coughing up and blowing out yellow phlegm. Pt took an at home test yesterday am and it was negative.    • Cough   • Hoarse   • Chest Pain     Pt states chest tightness, not pain & upper back pain.             History of Present Illness  This is a 62-year-old female presenting to clinic complaining of nasal congestion and productive cough since 2022.  Her symptoms have been progressively worsening with laryngitis beginning 4 to 5 days ago and no worsening/deepening cough for the last 3 days.  She has been using multiple at home treatments for her symptoms including throat drops, tea with honey and lemon, and Robitussin-DM without much improvement.  She denies any known sick contacts but was recently traveling to [place] for a .            Review of Systems   Constitutional: Positive for fever (subjective). Negative for chills and fatigue.   HENT: Positive for congestion, ear pain, nosebleeds (right side), rhinorrhea, sinus pressure, sore throat and voice change.    Respiratory: Positive for cough. Negative for shortness of breath and wheezing.    Cardiovascular: Positive for chest pain (with cough).   Gastrointestinal: Negative for abdominal pain, diarrhea, nausea and vomiting.   Musculoskeletal: Negative for myalgias.   Skin: Negative for rash.   Neurological: Positive for headaches.            Past Medical History:   Diagnosis Date   • Allergic rhinitis    • Asymptomatic menopausal state    • Cough    • COVID-19    • Essential (primary) hypertension    • Mild intermittent asthma     allergy induced, uses an inhaler 1x day   • Other dorsalgia    • Overflow incontinence             Past Surgical History:   Procedure Laterality Date   • BILATERAL BREAST REDUCTION     •  SECTION     • COLONOSCOPY     •  "HYSTERECTOMY     • LUMBAR DISCECTOMY FUSION INSTRUMENTATION     • NASAL SEPTUM SURGERY     • VARICOSE VEIN SURGERY              Family History   Problem Relation Age of Onset   • Cancer Mother    • COPD Mother    • Hypertension Mother    • Heart attack Father    • Coronary artery disease Father    • Hyperlipidemia Father    • Hypertension Father    • COPD Father    • Stroke Father    • Depression Father             Social History     Socioeconomic History   • Marital status:    • Number of children: 2   Tobacco Use   • Smoking status: Never   • Smokeless tobacco: Never   Vaping Use   • Vaping Use: Never used   Substance and Sexual Activity   • Alcohol use: Not Currently   • Drug use: Not Currently   • Sexual activity: Defer            No Known Allergies         Current Outpatient Medications on File Prior to Visit   Medication Sig Dispense Refill   • azelastine (ASTELIN) 0.1 % nasal spray 2 sprays into the nostril(s) as directed by provider Daily. Use in each nostril as directed 30 mL 5   • Breo Ellipta 200-25 MCG/INH inhaler Inhale 1 puff Daily. 1 each 2   • levocetirizine (XYZAL) 5 MG tablet Take 1 tablet by mouth Daily.     • [DISCONTINUED] tolterodine LA (Detrol LA) 2 MG 24 hr capsule Take 1 capsule by mouth Daily. 90 capsule 1   • [DISCONTINUED] triamcinolone (KENALOG) 0.1 % ointment Apply 1 application topically to the appropriate area as directed 2 (Two) Times a Day. 30 g 1   • [DISCONTINUED] diclofenac (VOLTAREN) 75 MG EC tablet        No current facility-administered medications on file prior to visit.            BP (!) 185/100 (BP Location: Right arm, Patient Position: Sitting, Cuff Size: Large Adult)   Pulse 91   Temp 98.6 °F (37 °C) (Oral)   Ht 165.1 cm (65\")   Wt 124 kg (273 lb 3.2 oz)   SpO2 94% Comment: room air  BMI 45.46 kg/m²          Objective     Physical Exam  Vitals and nursing note reviewed.   Constitutional:       General: She is not in acute distress.     Appearance: Normal " appearance.   HENT:      Head: Normocephalic and atraumatic.      Right Ear: Tympanic membrane, ear canal and external ear normal.      Left Ear: Tympanic membrane, ear canal and external ear normal.      Nose: Congestion and rhinorrhea present.      Mouth/Throat:      Mouth: Mucous membranes are moist.      Pharynx: Oropharynx is clear. Posterior oropharyngeal erythema present.      Comments: Hoarseness noted to voice  Eyes:      Extraocular Movements: Extraocular movements intact.      Conjunctiva/sclera: Conjunctivae normal.      Pupils: Pupils are equal, round, and reactive to light.   Cardiovascular:      Rate and Rhythm: Normal rate and regular rhythm.      Heart sounds: Normal heart sounds.   Pulmonary:      Effort: Pulmonary effort is normal. No respiratory distress.      Breath sounds: Normal breath sounds. No wheezing, rhonchi or rales.   Musculoskeletal:      Cervical back: Normal range of motion. No rigidity.   Skin:     General: Skin is warm and dry.   Neurological:      Mental Status: She is alert and oriented to person, place, and time.   Psychiatric:         Mood and Affect: Mood normal.         Behavior: Behavior normal.              Procedures                    Lab Results (last 24 hours)     Procedure Component Value Units Date/Time    POCT SARS-CoV-2 Antigen ALANA + Flu [[id number]] Collected: 11/21/22 0956    Specimen: Swab Updated: 11/21/22 0957     SARS Antigen Not Detected     Influenza A Antigen ALANA Not Detected     Influenza B Antigen ALANA Not Detected     Internal Control Passed     Lot Number [id number]     Expiration Date 8/25/23                No Radiology Exams Resulted Within Past 24 Hours                    Diagnoses and all orders for this visit:    1. Acute non-recurrent maxillary sinusitis (Primary)  Comments:  Concern for bacterial infection given worsening symptoms after 7 to 10 days.  Will cover with antibiotic.  Return/ER precautions discussed.  Orders:  -     cefdinir (OMNICEF) 300  MG capsule; Take 1 capsule by mouth 2 (Two) Times a Day for 10 days.  Dispense: 20 capsule; Refill: 0    2. Elevated blood pressure reading  Comments:  Recommend continuing to monitor at home.  Stop over-the-counter cough/sinus medications.  Follow-up with PCP if blood pressure remains elevated.    3. Acute cough  Comments:  Stop over-the-counter cough/decongestant medications as above.  Orders:  -     POCT SARS-CoV-2 Antigen ALANA + Flu  -     benzonatate (TESSALON) 200 MG capsule; Take 1 capsule by mouth 3 (Three) Times a Day As Needed for Cough.  Dispense: 21 capsule; Refill: 0                           FOR FULL DISCHARGE INSTRUCTIONS/COMMENTS/HANDOUTS please see the   AVS

## 2022-11-23 ENCOUNTER — TELEPHONE (OUTPATIENT)
Dept: FAMILY MEDICINE CLINIC | Age: 62
End: 2022-11-23

## 2022-11-23 RX ORDER — DEXTROMETHORPHAN HYDROBROMIDE AND PROMETHAZINE HYDROCHLORIDE 15; 6.25 MG/5ML; MG/5ML
5 SYRUP ORAL 4 TIMES DAILY PRN
Qty: 180 ML | Refills: 0 | Status: SHIPPED | OUTPATIENT
Start: 2022-11-23 | End: 2023-01-24

## 2022-11-23 NOTE — TELEPHONE ENCOUNTER
Caller: Radha Herr    Relationship: Self    Best call back number: [phone]    What medication are you requesting: COUGH SYRUP    What are your current symptoms: COUGHING    How long have you been experiencing symptoms: WAS SEEN ON MONDAY    Have you had these symptoms before:    [x] Yes  [] No    Have you been treated for these symptoms before:   [x] Yes  [] No    If a prescription is needed, what is your preferred pharmacy and phone number: [facility] - [place]     Additional notes:PATIENT SAID THAT [facility] AREN'T WORKING AND IS REQUESTING COUGH SYRUP PLEASE

## 2022-12-02 ENCOUNTER — TELEPHONE (OUTPATIENT)
Dept: FAMILY MEDICINE CLINIC | Age: 62
End: 2022-12-02

## 2022-12-12 ENCOUNTER — LAB (OUTPATIENT)
Dept: LAB | Facility: HOSPITAL | Age: 62
End: 2022-12-12

## 2022-12-12 DIAGNOSIS — Z13.6 ENCOUNTER FOR SCREENING FOR CARDIOVASCULAR DISORDERS: ICD-10-CM

## 2022-12-12 LAB
ALBUMIN SERPL-MCNC: 4.4 G/DL (ref 3.5–5.2)
ALBUMIN/GLOB SERPL: 1.8 G/DL
ALP SERPL-CCNC: 73 U/L (ref 39–117)
ALT SERPL W P-5'-P-CCNC: 18 U/L (ref 1–33)
ANION GAP SERPL CALCULATED.3IONS-SCNC: 11.7 MMOL/L (ref 5–15)
AST SERPL-CCNC: 17 U/L (ref 1–32)
BILIRUB SERPL-MCNC: 0.4 MG/DL (ref 0–1.2)
BUN SERPL-MCNC: 13 MG/DL (ref 8–23)
BUN/CREAT SERPL: 19.7 (ref 7–25)
CALCIUM SPEC-SCNC: 9.2 MG/DL (ref 8.6–10.5)
CHLORIDE SERPL-SCNC: 101 MMOL/L (ref 98–107)
CHOLEST SERPL-MCNC: 248 MG/DL (ref 0–200)
CO2 SERPL-SCNC: 26.3 MMOL/L (ref 22–29)
CREAT SERPL-MCNC: 0.66 MG/DL (ref 0.57–1)
DEPRECATED RDW RBC AUTO: 43.6 FL (ref 37–54)
EGFRCR SERPLBLD CKD-EPI 2021: 99.9 ML/MIN/1.73
ERYTHROCYTE [DISTWIDTH] IN BLOOD BY AUTOMATED COUNT: 13.2 % (ref 12.3–15.4)
GLOBULIN UR ELPH-MCNC: 2.4 GM/DL
GLUCOSE SERPL-MCNC: 99 MG/DL (ref 65–99)
HCT VFR BLD AUTO: 42.7 % (ref 34–46.6)
HDLC SERPL-MCNC: 61 MG/DL (ref 40–60)
HGB BLD-MCNC: 13.9 G/DL (ref 12–15.9)
LDLC SERPL CALC-MCNC: 158 MG/DL (ref 0–100)
LDLC/HDLC SERPL: 2.53 {RATIO}
MCH RBC QN AUTO: 29.1 PG (ref 26.6–33)
MCHC RBC AUTO-ENTMCNC: 32.6 G/DL (ref 31.5–35.7)
MCV RBC AUTO: 89.3 FL (ref 79–97)
PLATELET # BLD AUTO: 295 10*3/MM3 (ref 140–450)
PMV BLD AUTO: 10 FL (ref 6–12)
POTASSIUM SERPL-SCNC: 4.3 MMOL/L (ref 3.5–5.2)
PROT SERPL-MCNC: 6.8 G/DL (ref 6–8.5)
RBC # BLD AUTO: 4.78 10*6/MM3 (ref 3.77–5.28)
SODIUM SERPL-SCNC: 139 MMOL/L (ref 136–145)
TRIGL SERPL-MCNC: 164 MG/DL (ref 0–150)
VLDLC SERPL-MCNC: 29 MG/DL (ref 5–40)
WBC NRBC COR # BLD: 5.85 10*3/MM3 (ref 3.4–10.8)

## 2022-12-12 PROCEDURE — 80061 LIPID PANEL: CPT

## 2022-12-12 PROCEDURE — 85027 COMPLETE CBC AUTOMATED: CPT

## 2022-12-12 PROCEDURE — 80053 COMPREHEN METABOLIC PANEL: CPT

## 2022-12-12 PROCEDURE — 36415 COLL VENOUS BLD VENIPUNCTURE: CPT

## 2022-12-22 DIAGNOSIS — E78.00 HYPERCHOLESTEROLEMIA: Primary | ICD-10-CM

## 2022-12-22 RX ORDER — ROSUVASTATIN CALCIUM 5 MG/1
5 TABLET, COATED ORAL DAILY
Qty: 90 TABLET | Refills: 0 | Status: SHIPPED | OUTPATIENT
Start: 2022-12-22 | End: 2023-02-07

## 2022-12-22 NOTE — TELEPHONE ENCOUNTER
However her cholesterol is very high and I want to start her on cholesterol-lowering medication.  Please let me know if she is willing and I will send in Crestor 5 mg daily for her and repeat her labs in 3 months.  Dr. Krishna      Pt is calling back and is willing to try Crestor I pend it below and also a lipid

## 2023-01-24 ENCOUNTER — OFFICE VISIT (OUTPATIENT)
Dept: FAMILY MEDICINE CLINIC | Age: 63
End: 2023-01-24
Payer: COMMERCIAL

## 2023-01-24 VITALS
BODY MASS INDEX: 47.28 KG/M2 | HEIGHT: 65 IN | DIASTOLIC BLOOD PRESSURE: 90 MMHG | WEIGHT: 283.8 LBS | TEMPERATURE: 98 F | SYSTOLIC BLOOD PRESSURE: 137 MMHG | OXYGEN SATURATION: 96 % | HEART RATE: 98 BPM

## 2023-01-24 DIAGNOSIS — E78.2 MIXED HYPERLIPIDEMIA: ICD-10-CM

## 2023-01-24 DIAGNOSIS — I10 PRIMARY HYPERTENSION: ICD-10-CM

## 2023-01-24 DIAGNOSIS — J34.89 NASAL DRAINAGE: ICD-10-CM

## 2023-01-24 DIAGNOSIS — Z28.81 VACCINATION HESITANCY BY PATIENT: ICD-10-CM

## 2023-01-24 DIAGNOSIS — J40 BRONCHITIS: Primary | ICD-10-CM

## 2023-01-24 PROBLEM — Z28.21 VACCINATION HESITANCY BY PATIENT: Status: ACTIVE | Noted: 2023-01-24

## 2023-01-24 PROCEDURE — 99214 OFFICE O/P EST MOD 30 MIN: CPT | Performed by: FAMILY MEDICINE

## 2023-01-24 PROCEDURE — 87428 SARSCOV & INF VIR A&B AG IA: CPT | Performed by: FAMILY MEDICINE

## 2023-01-24 RX ORDER — HYDROCHLOROTHIAZIDE 12.5 MG/1
12.5 TABLET ORAL DAILY
Qty: 90 TABLET | Refills: 1 | Status: SHIPPED | OUTPATIENT
Start: 2023-01-24 | End: 2023-03-13

## 2023-01-24 RX ORDER — AZITHROMYCIN 250 MG/1
TABLET, FILM COATED ORAL
Qty: 6 TABLET | Refills: 0 | Status: SHIPPED | OUTPATIENT
Start: 2023-01-24 | End: 2023-02-20

## 2023-01-24 RX ORDER — HYDROCHLOROTHIAZIDE 25 MG/1
25 TABLET ORAL DAILY
Qty: 90 TABLET | Refills: 1 | Status: CANCELLED | OUTPATIENT
Start: 2023-01-24

## 2023-01-24 NOTE — ASSESSMENT & PLAN NOTE
She was agreeable to go ahead and get started on medication for her blood pressure.  We will give HCTZ just 12.5 mg daily.  Told her we probably want to check labs to follow-up on her potassium level at her next visit.  We will get her scheduled follow-up with PCP.  Encouraged her to prioritize her own health and preventive care.

## 2023-01-24 NOTE — ASSESSMENT & PLAN NOTE
Reluctantly I am going to go ahead and prescribe Zithromax.  Her symptoms been going on for about 8 days she does report productive sputum.  Emphasized the importance of using the inhaler is probably going to do more benefit than anything.  She may want to consider allergy testing.

## 2023-01-24 NOTE — ASSESSMENT & PLAN NOTE
Patient has some hesitancy about vaccinations.  Discussed with her how vaccines work and with her underlying pulmonary issues would certainly be important for her to get flu vaccination as well as her COVID booster shot.  Since she is not feeling well today good to hold off on those at this time but hopefully she will consider getting those as soon as she is feeling a little better.

## 2023-01-24 NOTE — PROGRESS NOTES
Chief Complaint  nasal drainage (Sx started 8 days ago), Headache, Cough, and URI    Subjective          Radha Herr presents to [facility]  History of Present Illness    This is my first time to see Ms. Herr.  In review of her chart it appears that she only gets episodic care and has not really established herself for health maintenance and primary care.  This has resulted in a failure to get on treatment her blood pressure as well as completing her health maintenance evaluations.  She was recently diagnosed with hyperlipidemia and started on rosuvastatin. She had mammo ordered in March, but did not complete.  She states that her  has been ill and she has been focusing on his needs.  She states that she follows her blood pressure at home with a wrist monitor.  Had been told by another doctor in another state that he thought she had whitecoat hypertension.  She has never validated her home monitor against the doctor's office reading.    Had covid Jan 2020    Chronic allergies and asthma  Feels like like niagra falls down the back of my throat  Now ribs hurt because coughing so much  Taking mucinex and tessalon pearls  Ears also full  Some yellow nasal drainage  Coughing up yellow sputum  She is using the Breo Daily.       Current Outpatient Medications   Medication Sig Dispense Refill   • azelastine (ASTELIN) 0.1 % nasal spray 2 sprays into the nostril(s) as directed by provider Daily. Use in each nostril as directed 30 mL 5   • benzonatate (Tessalon Perles) 100 MG capsule Take 2 capsules by mouth 3 (Three) Times a Day As Needed for Cough. 60 capsule 0   • Breo Ellipta 200-25 MCG/INH inhaler Inhale 1 puff Daily. 1 each 2   • levocetirizine (XYZAL) 5 MG tablet Take 1 tablet by mouth Daily.     • rosuvastatin (Crestor) 5 MG tablet Take 1 tablet by mouth Daily. 90 tablet 0   • azithromycin (Zithromax Z-Zeke) 250 MG tablet Take 2 tablets by mouth on day 1, then 1 tablet by  "mouth daily on days 2-5 6 tablet 0   • hydroCHLOROthiazide (HYDRODIURIL) 12.5 MG tablet Take 1 tablet by mouth Daily. 90 tablet 1     No current facility-administered medications for this visit.       Review of Systems         Objective   Vital Signs:   /90 (BP Location: Left arm, Patient Position: Sitting, Cuff Size: Large Adult)   Pulse 98   Temp 98 °F (36.7 °C) (Oral)   Ht 165.1 cm (65\")   Wt 129 kg (283 lb 12.8 oz)   SpO2 96% Comment: room air  BMI 47.23 kg/m²     Physical Exam   No acute distress  Obese  Tympanic membranes are clear  Oropharynx is clear  Heart regular rate rhythm no murmur  Lungs actually sound clear without wheezes or crackles  She does have significant coughing paroxysms, but had no sputum production while she was in the office  Lower extremities without edema    Result Review :                     Assessment and Plan    Diagnoses and all orders for this visit:    1. Bronchitis (Primary)  Assessment & Plan:  Reluctantly I am going to go ahead and prescribe Zithromax.  Her symptoms been going on for about 8 days she does report productive sputum.  Emphasized the importance of using the inhaler is probably going to do more benefit than anything.  She may want to consider allergy testing.    Orders:  -     azithromycin (Zithromax Z-Zeke) 250 MG tablet; Take 2 tablets by mouth on day 1, then 1 tablet by mouth daily on days 2-5  Dispense: 6 tablet; Refill: 0    2. Nasal drainage  -     POCT SARS-CoV-2 Antigen ALANA + Flu    3. Primary hypertension  Assessment & Plan:  She was agreeable to go ahead and get started on medication for her blood pressure.  We will give HCTZ just 12.5 mg daily.  Told her we probably want to check labs to follow-up on her potassium level at her next visit.  We will get her scheduled follow-up with PCP.  Encouraged her to prioritize her own health and preventive care.    Orders:  -     hydroCHLOROthiazide (HYDRODIURIL) 12.5 MG tablet; Take 1 tablet by mouth " Daily.  Dispense: 90 tablet; Refill: 1    4. Mixed hyperlipidemia  Assessment & Plan:  She is tolerating rosuvastatin.  Follow-up labs already ordered      5. Vaccination hesitancy by patient  Assessment & Plan:  Patient has some hesitancy about vaccinations.  Discussed with her how vaccines work and with her underlying pulmonary issues would certainly be important for her to get flu vaccination as well as her COVID booster shot.  Since she is not feeling well today good to hold off on those at this time but hopefully she will consider getting those as soon as she is feeling a little better.        Follow Up   No follow-ups on file.  Patient was given instructions and counseling regarding her condition or for health maintenance advice. Please see specific information pulled into the AVS if appropriate.

## 2023-01-27 ENCOUNTER — TELEPHONE (OUTPATIENT)
Dept: FAMILY MEDICINE CLINIC | Age: 63
End: 2023-01-27

## 2023-01-27 RX ORDER — FLUTICASONE PROPIONATE 50 MCG
1 SPRAY, SUSPENSION (ML) NASAL DAILY
Qty: 16 G | Refills: 0 | Status: SHIPPED | OUTPATIENT
Start: 2023-01-27 | End: 2023-02-20

## 2023-01-27 NOTE — TELEPHONE ENCOUNTER
Caller: Radha Herr    Relationship: Self    Best call back number: [phone]    Requested Prescriptions:   Requested Prescriptions      No prescriptions requested or ordered in this encounter   FLUTICASONE PROPIONATE NASAL SPRAY 50 MCG (NOT ON MEDICATION LIST)     Pharmacy where request should be sent: [facility]     Does the patient have less than a 3 day supply:  [x] Yes  [] No    Would you like a call back once the refill request has been completed: [x] Yes [] No    If the office needs to give you a call back, can they leave a voicemail: [x] Yes [] No    Cory Lee Rep   01/27/23 10:50 EST

## 2023-01-27 NOTE — TELEPHONE ENCOUNTER
Caller: Radha Herr    Relationship: Self    Best call back number: [phone]    What is the best time to reach you: ANYTIME    Who are you requesting to speak with (clinical staff, provider,  specific staff member): CLINICAL    What was the call regarding: PATIENT IS REQUESTING CALL REGARDING THIS TO LET HER KNOW AS SHE IS ATTEMPTING TO PHARMACY TO PICK THIS UP TODAY, 01/27/2023. PATIENT WAS INFORMED THAT PHARMACY HAS NOT RECEIVED ANYTHING YET.     Do you require a callback: YES

## 2023-01-27 NOTE — TELEPHONE ENCOUNTER
Caller: Radha Herr    Relationship: Self    Best call back number: [phone]    What medication are you requesting: FOR     What are your current symptoms: COUGH MEDICATION    How long have you been experiencing symptoms: ABOUT 2 WEEKS    Have you had these symptoms before:    [x] Yes  [] No    Have you been treated for these symptoms before:   [x] Yes  [] No    If a prescription is needed, what is your preferred pharmacy and phone number: [facility] - [place]     Additional notes: PATIENT IS REQUESTING MEDICATION FOR HER COUGH. PLEASE CALL PATIENT WHEN THE MEDICATION IS CALLED IN.

## 2023-01-27 NOTE — TELEPHONE ENCOUNTER
Caller: Radha Herr     Relationship: Self     Best call back number: [phone]     What is the best time to reach you: ANYTIME     Who are you requesting to speak with (clinical staff, provider,  specific staff member): CLINICAL     What was the call regarding: PATIENT IS REQUESTING CALL REGARDING THIS TO LET HER KNOW AS SHE IS ATTEMPTING TO PHARMACY TO PICK THIS UP TODAY, 01/27/2023. PATIENT WAS INFORMED THAT PHARMACY HAS NOT RECEIVED ANYTHING YET.      Do you require a callback: YES

## 2023-01-27 NOTE — TELEPHONE ENCOUNTER
I called pt due to it not being on her current med list and she said that she does not have the box but she uses it as needed and she has used it for 1 year . PCP is out of the office since you saw her on Tuesday will you prescribe

## 2023-02-06 ENCOUNTER — TELEPHONE (OUTPATIENT)
Dept: FAMILY MEDICINE CLINIC | Age: 63
End: 2023-02-06
Payer: COMMERCIAL

## 2023-02-06 NOTE — TELEPHONE ENCOUNTER
Caller: Radha Herr    Relationship: Self    Best call back number: [phone]    What medications are you currently taking:   Current Outpatient Medications on File Prior to Visit   Medication Sig Dispense Refill   • azelastine (ASTELIN) 0.1 % nasal spray 2 sprays into the nostril(s) as directed by provider Daily. Use in each nostril as directed 30 mL 5   • azithromycin (Zithromax Z-Zeke) 250 MG tablet Take 2 tablets by mouth on day 1, then 1 tablet by mouth daily on days 2-5 6 tablet 0   • benzonatate (Tessalon Perles) 100 MG capsule Take 2 capsules by mouth 3 (Three) Times a Day As Needed for Cough. 60 capsule 0   • Breo Ellipta 200-25 MCG/INH inhaler Inhale 1 puff Daily. 1 each 2   • fluticasone (FLONASE) 50 MCG/ACT nasal spray Use 1 spray into each nostril as directed by provider Daily. 16 g 0   • hydroCHLOROthiazide (HYDRODIURIL) 12.5 MG tablet Take 1 tablet by mouth Daily. 90 tablet 1   • levocetirizine (XYZAL) 5 MG tablet Take 1 tablet by mouth Daily.     • rosuvastatin (Crestor) 5 MG tablet Take 1 tablet by mouth Daily. 90 tablet 0     No current facility-administered medications on file prior to visit.          When did you start taking these medications: 5-6 WEEKS AGO    Which medication are you concerned about: ROSUVASTATIN    Who prescribed you this medication: Swetha Krishna MD      What are your concerns: MEDICATION IS CAUSING 'BURNING' PAIN IN LEGS, UPPER AND LOWER;  HASN'T DECREASED SINCE DAY 2    PLEASE ADVISE

## 2023-02-07 NOTE — TELEPHONE ENCOUNTER
Please have her stop rosuvastatin and let see how she does.  She should be off of it 6 weeks.  Dr. Krishna

## 2023-02-17 ENCOUNTER — TELEPHONE (OUTPATIENT)
Dept: FAMILY MEDICINE CLINIC | Age: 63
End: 2023-02-17
Payer: COMMERCIAL

## 2023-02-17 ENCOUNTER — TELEPHONE (OUTPATIENT)
Dept: FAMILY MEDICINE CLINIC | Age: 63
End: 2023-02-17

## 2023-02-17 NOTE — TELEPHONE ENCOUNTER
I called pt and her temp last night was 101.9 now it is 99.3 I advised her to seek urgent care today and not wait until Monday due to no available appointments today here .

## 2023-02-17 NOTE — TELEPHONE ENCOUNTER
I discussed with DR Klein on call and he said she should continue OTC meds and monitor symptoms she was advised that if she has SOA, chest pain she would need to go to the er . She will need to keep her appointment with DR Dougherty on Monday to follow up since she just tested positive today if pcp feels that pt needs paxlovid this can be started at that time and we will do this via telehealth she was also educated on isolation cdc guidelines.

## 2023-02-17 NOTE — TELEPHONE ENCOUNTER
Caller: Radha Herr    Relationship to patient: Self    Best call back number:[phone]    Patient is needing: PATIENT IS COVID POSITIVE ON AN AT HOME TEST AND SHE [facility] AN APPOINTMENT [facility]. SHE IS ASKING FOR A CALL BACK WITH NEXT BEST STEPS PLEASE

## 2023-02-17 NOTE — TELEPHONE ENCOUNTER
Caller: Radha Herr    Relationship: Self    Best call back number: [phone]  What medication are you requesting: SINUS SYMPTOMS, FEVER MEDICATION    What are your current symptoms: SINUS SYMPTOMS, CHILLS, YELLOW DRAINAGE    How long have you been experiencing symptoms: PATIENT WAS SEEN FOR THESE SAME SYMPTOMS ABOUT 3 WEEKS AGO    Have you had these symptoms before:    [x] Yes  [] No    Have you been treated for these symptoms before:   [x] Yes  [] No    If a prescription is needed, what is your preferred pharmacy and phone number: [facility]     Additional notes:PATIENT HAS AN APPOINTMENT ON Monday AND IS ON THE WAIT LIST

## 2023-02-20 ENCOUNTER — OFFICE VISIT (OUTPATIENT)
Dept: FAMILY MEDICINE CLINIC | Age: 63
End: 2023-02-20
Payer: COMMERCIAL

## 2023-02-20 VITALS — WEIGHT: 275 LBS | HEIGHT: 65 IN | BODY MASS INDEX: 45.82 KG/M2

## 2023-02-20 DIAGNOSIS — J01.00 SUBACUTE MAXILLARY SINUSITIS: Primary | ICD-10-CM

## 2023-02-20 DIAGNOSIS — U07.1 COVID-19 VIRUS DETECTED: ICD-10-CM

## 2023-02-20 PROCEDURE — 99213 OFFICE O/P EST LOW 20 MIN: CPT | Performed by: FAMILY MEDICINE

## 2023-02-20 RX ORDER — DEXTROMETHORPHAN HYDROBROMIDE AND PROMETHAZINE HYDROCHLORIDE 15; 6.25 MG/5ML; MG/5ML
5 SYRUP ORAL 4 TIMES DAILY PRN
Qty: 180 ML | Refills: 0 | Status: SHIPPED | OUTPATIENT
Start: 2023-02-20

## 2023-02-20 RX ORDER — AMOXICILLIN AND CLAVULANATE POTASSIUM 875; 125 MG/1; MG/1
1 TABLET, FILM COATED ORAL 2 TIMES DAILY
Qty: 20 TABLET | Refills: 0 | Status: SHIPPED | OUTPATIENT
Start: 2023-02-20 | End: 2023-03-02

## 2023-02-20 NOTE — PROGRESS NOTES
Radha Herr presents to [facility] TELEVIDEO via doximetry for covid infection    Chief Complaint:  covid    Subjective       History of Present Illness:  LALITHA Julien is being seen today via televideo for positive covid.  She had a fever the first few days and now she is having significant sinus pain and drainage with ear congestion, productive wet cough-deep and yellow.  She is on saline solution in nares B and she cant get it to clear. Her symptoms onset 8 days ago. She checks her O2 sats and it has been running 95-97%      Review of Systems:  Review of Systems   Constitutional: Positive for chills and fatigue. Negative for fever.   HENT: Positive for congestion, ear pain and sinus pressure. Negative for sore throat.    Eyes: Negative for blurred vision and double vision.   Respiratory: Positive for cough and shortness of breath. Negative for wheezing.    Cardiovascular: Negative for chest pain, palpitations and leg swelling.   Gastrointestinal: Negative for abdominal pain, constipation, diarrhea, nausea and vomiting.   Skin: Negative for rash.   Neurological: Positive for headache. Negative for dizziness.        Objective   Medical History:  Past Medical History:   • Allergic    Dust, pollen, mold, grass clippings, trees   • Allergic rhinitis   • Asymptomatic menopausal state   • Clotting disorder (HCC)    Not enough skin covering my membranes   • Cough   • COVID-19   • Diverticulosis   • Essential (primary) hypertension   • Headache   • History of medical problems    had varithena vein surgery   • Mild intermittent asthma    allergy induced, uses an inhaler 1x day   • Other dorsalgia   • Overflow incontinence     Past Surgical History:   • BILATERAL BREAST REDUCTION   • BREAST SURGERY   •  SECTION   • COLONOSCOPY   • HYSTERECTOMY   • LUMBAR DISCECTOMY FUSION INSTRUMENTATION   • NASAL SEPTUM SURGERY   • SINUS SURGERY    Deviated septum   • VARICOSE VEIN SURGERY       Family History   Problem Relation Age of Onset   • Cancer Mother            • COPD Mother            • Hypertension Mother            • Heart attack Father    • Coronary artery disease Father    • Hyperlipidemia Father            • Hypertension Father            • COPD Father            • Stroke Father    • Depression Father    • Mental illness Father    • Stroke Maternal Grandfather            • Diabetes Maternal Grandmother              Social History     Tobacco Use   • Smoking status: Never   • Smokeless tobacco: Never   Substance Use Topics   • Alcohol use: Not Currently       Health Maintenance Due   Topic Date Due   • COLORECTAL CANCER SCREENING  Never done   • TDAP/TD VACCINES (1 - Tdap) Never done   • ZOSTER VACCINE (1 of 2) Never done   • HEPATITIS C SCREENING  Never done   • ANNUAL PHYSICAL  Never done   • COVID-19 Vaccine (3 - Booster for Pfizer series) 2021   • INFLUENZA VACCINE  Never done        Immunization History   Administered Date(s) Administered   • COVID-19 (PFIZER) PURPLE CAP 06/15/2021, 2021       No Known Allergies     Medications:  Current Outpatient Medications on File Prior to Visit   Medication Sig   • benzonatate (Tessalon Perles) 100 MG capsule Take 2 capsules by mouth 3 (Three) Times a Day As Needed for Cough.   • Breo Ellipta 200-25 MCG/INH inhaler Inhale 1 puff Daily.   • hydroCHLOROthiazide (HYDRODIURIL) 12.5 MG tablet Take 1 tablet by mouth Daily.   • levocetirizine (XYZAL) 5 MG tablet Take 1 tablet by mouth Daily.   • [DISCONTINUED] azelastine (ASTELIN) 0.1 % nasal spray 2 sprays into the nostril(s) as directed by provider Daily. Use in each nostril as directed   • [DISCONTINUED] azithromycin (Zithromax Z-Zeke) 250 MG tablet Take 2 tablets by mouth on day 1, then 1 tablet by mouth daily on days 2-5   • [DISCONTINUED] fluticasone (FLONASE) 50 MCG/ACT nasal spray Use 1 spray into each nostril as directed  "by provider Daily.     No current facility-administered medications on file prior to visit.       Vital Signs:   Ht 165.1 cm (65\")   Wt 125 kg (275 lb)   BMI 45.76 kg/m²       Physical Exam:  Physical Exam  Vitals reviewed.   Constitutional:       General: She is not in acute distress.     Appearance: Normal appearance. She is normal weight. She is ill-appearing.   HENT:      Head: Normocephalic and atraumatic.   Eyes:      Extraocular Movements: Extraocular movements intact.      Pupils: Pupils are equal, round, and reactive to light.   Pulmonary:      Effort: Pulmonary effort is normal.   Neurological:      General: No focal deficit present.      Mental Status: She is alert and oriented to person, place, and time.   Psychiatric:         Mood and Affect: Mood normal.         Behavior: Behavior normal.         Thought Content: Thought content normal.         Judgment: Judgment normal.         Result Review      The following data was reviewed by Swetha Krishna MD on 02/20/2023.  Lab Results   Component Value Date    WBC 5.85 12/12/2022    HGB 13.9 12/12/2022    HCT 42.7 12/12/2022    MCV 89.3 12/12/2022     12/12/2022     Lab Results   Component Value Date    GLUCOSE 99 12/12/2022    BUN 13 12/12/2022    CREATININE 0.66 12/12/2022    EGFR 99.9 12/12/2022    BCR 19.7 12/12/2022    K 4.3 12/12/2022    CO2 26.3 12/12/2022    CALCIUM 9.2 12/12/2022    ALBUMIN 4.40 12/12/2022    AST 17 12/12/2022    ALT 18 12/12/2022     Lab Results   Component Value Date    CHOL 248 (H) 12/12/2022    TRIG 164 (H) 12/12/2022    HDL 61 (H) 12/12/2022     (H) 12/12/2022     Lab Results   Component Value Date    TSH 1.520 07/07/2021     No results found for: HGBA1C  No results found for: PSA                    Assessment and Plan:          Diagnoses and all orders for this visit:    1. Subacute maxillary sinusitis (Primary)  -     promethazine-dextromethorphan (PROMETHAZINE-DM) 6.25-15 MG/5ML syrup; Take 5 mL by mouth 4 " (Four) Times a Day As Needed for Cough.  Dispense: 180 mL; Refill: 0  -     amoxicillin-clavulanate (Augmentin) 875-125 MG per tablet; Take 1 tablet by mouth 2 (Two) Times a Day for 10 days.  Dispense: 20 tablet; Refill: 0    2. COVID-19 virus detected      She is aware that she is supposed to quarantine for 5 days and then wear her mask for another 5 days or as long as she is symptomatic around others.  She no longer is having fevers.  She is 8 days into this illness and having worsening sinus pain and a deep bronchitis type cough.  At this stage I am going to go ahead and add Augmentin for secondary sinus infection.  I have counseled her that if she has worsening shortness of air, decreased O2 sats or chest pain then she is going to need to be seen ASAP either by her clinic or ER.  She is to push fluids and continue her nasal saline spray.  I will also send her in a cough syrup.  Dr. Krishna    Follow Up   Return if symptoms worsen or fail to improve or worsening SOA or CP.

## 2023-02-28 ENCOUNTER — TELEPHONE (OUTPATIENT)
Dept: FAMILY MEDICINE CLINIC | Age: 63
End: 2023-02-28

## 2023-02-28 NOTE — TELEPHONE ENCOUNTER
Caller: Radha Herr    Relationship: Self    Best call back number: [phone]    What orders are you requesting (i.e. lab or imaging): LABE ORDERS FOR UPCOMING APPOINTMENT ON 03/13        Where will you receive your lab/imaging services: [facility]    Additional notes: CALL PATIENT WHEN ORDERS HAVE BEEN PLACED

## 2023-03-06 ENCOUNTER — TELEPHONE (OUTPATIENT)
Dept: FAMILY MEDICINE CLINIC | Age: 63
End: 2023-03-06
Payer: COMMERCIAL

## 2023-03-08 ENCOUNTER — LAB (OUTPATIENT)
Dept: LAB | Facility: HOSPITAL | Age: 63
End: 2023-03-08
Payer: COMMERCIAL

## 2023-03-08 DIAGNOSIS — E78.00 HYPERCHOLESTEROLEMIA: ICD-10-CM

## 2023-03-08 LAB
CHOLEST SERPL-MCNC: 219 MG/DL (ref 0–200)
HDLC SERPL-MCNC: 51 MG/DL (ref 40–60)
LDLC SERPL CALC-MCNC: 140 MG/DL (ref 0–100)
LDLC/HDLC SERPL: 2.68 {RATIO}
TRIGL SERPL-MCNC: 157 MG/DL (ref 0–150)
VLDLC SERPL-MCNC: 28 MG/DL (ref 5–40)

## 2023-03-08 PROCEDURE — 36415 COLL VENOUS BLD VENIPUNCTURE: CPT

## 2023-03-08 PROCEDURE — 80061 LIPID PANEL: CPT

## 2023-03-13 ENCOUNTER — OFFICE VISIT (OUTPATIENT)
Dept: FAMILY MEDICINE CLINIC | Age: 63
End: 2023-03-13
Payer: COMMERCIAL

## 2023-03-13 VITALS
DIASTOLIC BLOOD PRESSURE: 84 MMHG | OXYGEN SATURATION: 96 % | SYSTOLIC BLOOD PRESSURE: 140 MMHG | TEMPERATURE: 97.8 F | WEIGHT: 274 LBS | BODY MASS INDEX: 45.65 KG/M2 | HEART RATE: 73 BPM | HEIGHT: 65 IN

## 2023-03-13 DIAGNOSIS — J30.9 ALLERGIC RHINITIS, UNSPECIFIED SEASONALITY, UNSPECIFIED TRIGGER: ICD-10-CM

## 2023-03-13 DIAGNOSIS — Z00.00 ANNUAL PHYSICAL EXAM: ICD-10-CM

## 2023-03-13 DIAGNOSIS — E66.01 MORBID (SEVERE) OBESITY DUE TO EXCESS CALORIES: ICD-10-CM

## 2023-03-13 DIAGNOSIS — Z12.11 COLON CANCER SCREENING: ICD-10-CM

## 2023-03-13 DIAGNOSIS — Z01.419 WELL WOMAN EXAM: Primary | ICD-10-CM

## 2023-03-13 DIAGNOSIS — Z11.59 ENCOUNTER FOR SCREENING FOR OTHER VIRAL DISEASES: ICD-10-CM

## 2023-03-13 DIAGNOSIS — Z12.31 ENCOUNTER FOR SCREENING MAMMOGRAM FOR BREAST CANCER: ICD-10-CM

## 2023-03-13 DIAGNOSIS — E78.00 HYPERCHOLESTEROLEMIA: ICD-10-CM

## 2023-03-13 DIAGNOSIS — I10 PRIMARY HYPERTENSION: ICD-10-CM

## 2023-03-13 DIAGNOSIS — R00.2 PALPITATION: ICD-10-CM

## 2023-03-13 DIAGNOSIS — Z23 ENCOUNTER FOR IMMUNIZATION: ICD-10-CM

## 2023-03-13 DIAGNOSIS — Z78.0 POSTMENOPAUSAL STATE: ICD-10-CM

## 2023-03-13 LAB
DEVELOPER EXPIRATION DATE: NORMAL
DEVELOPER LOT NUMBER: NORMAL
EXPIRATION DATE: NORMAL
FECAL OCCULT BLOOD SCREEN, POC: NEGATIVE
Lab: 1512
NEGATIVE CONTROL: NEGATIVE
POSITIVE CONTROL: POSITIVE

## 2023-03-13 PROCEDURE — 99396 PREV VISIT EST AGE 40-64: CPT | Performed by: FAMILY MEDICINE

## 2023-03-13 PROCEDURE — 93000 ELECTROCARDIOGRAM COMPLETE: CPT | Performed by: FAMILY MEDICINE

## 2023-03-13 PROCEDURE — 99214 OFFICE O/P EST MOD 30 MIN: CPT | Performed by: FAMILY MEDICINE

## 2023-03-13 PROCEDURE — 82270 OCCULT BLOOD FECES: CPT | Performed by: FAMILY MEDICINE

## 2023-03-13 RX ORDER — NIACIN 500 MG/1
500 TABLET, EXTENDED RELEASE ORAL NIGHTLY
Qty: 90 TABLET | Refills: 1 | Status: SHIPPED | OUTPATIENT
Start: 2023-03-13

## 2023-03-13 RX ORDER — METOPROLOL SUCCINATE 25 MG/1
25 TABLET, EXTENDED RELEASE ORAL DAILY
Qty: 30 TABLET | Refills: 5 | Status: SHIPPED | OUTPATIENT
Start: 2023-03-13

## 2023-03-13 NOTE — PROGRESS NOTES
Radha Herr presents to [facility].    Chief Complaint: Well woman exam and medication refills  Subjective       History of Present Illness:    She is in today for her routine gyne exam, she is s/p total hysterectomy.  She no longer needs pap smears.  Mammogram and dexa are UTD.  She has never had a colonoscopy and she has not had hepatitis C screening.  She is due for tetanus/influenza/shingles and COVID vaccination and defers all vaccines today.  She would like to check with her insurance to see where they are best covered.  She denies EtOH use, denies tobacco use, she wears her seatbelt in the car.    H/O asthma, she is on breo and tries to wean herself off this med and is unable too. She also suffers from chronic cough, daily, and she does has chronic allergies, she is on xyzal her allergies are not any better, she has ongoing runny nose, stuffy nose, PND and sinus pressure.  She has tried and failed flonase (nose bleeds)/singulair/allegra/claritan in past     She has hypertension ongoing high blood pressures, borderline today, and her home BP are normal, she is on HCTZ and feels like her heart races on this medication    H/O chronic bladder leakage and incontinence and tried kegel exercises without improvement,onset a 5-6 months ago,  she is s/p 2  sections and hysterectomy and oophrectomy in past.  Urine is clear and no odor        Hypercholesterolemia, started on crestor and it caused body aches so she stopped this med, her cholesterol is better and she was off her med for 2 weeks prior to testing and her chol was 219.       Review of Systems:  Review of Systems       CONSTITUTIONAL:  Negative for chills and fever.      EYES:  Negative for blurred vision and eye pain.      E/N/T:  Positive for nasal congestion and frequent rhinorrhea.   Negative for ear pain, sore throat or loss of taste and smell.      CARDIOVASCULAR:    Negative for chest pain, dizziness,  pedal edema or  tachycardia.      RESPIRATORY:  Positive for chronic cough.   Negative for dyspnea or frequent wheezing.      GASTROINTESTINAL:  Negative for abdominal pain, constipation, diarrhea, hematochezia, melena, nausea and vomiting.      GENITOURINARY:  Negative for dysuria and hematuria.      MUSCULOSKELETAL:  Negative for myalgias.      INTEGUMENTARY/BREAST:  Negative for rash.      NEUROLOGICAL:  Negative for dizziness, headaches and weakness.      PSYCHIATRIC:  Positive for feelings of stress.   Negative for anxiety, depression, sleep disturbance or suicidal thoughts.            Objective   Medical History:  Past Medical History:   • Allergic    Dust, pollen, mold, grass clippings, trees   • Allergic rhinitis   • Asymptomatic menopausal state   • Clotting disorder (HCC)    Not enough skin covering my membranes   • Cough   • COVID-19   • Diverticulosis   • Essential (primary) hypertension   • Headache   • History of medical problems    had varithena vein surgery   • Mild intermittent asthma    allergy induced, uses an inhaler 1x day   • Other dorsalgia   • Overflow incontinence     Past Surgical History:   • BILATERAL BREAST REDUCTION   • BREAST SURGERY   •  SECTION   • COLONOSCOPY   • HYSTERECTOMY   • LUMBAR DISCECTOMY FUSION INSTRUMENTATION   • NASAL SEPTUM SURGERY   • SINUS SURGERY    Deviated septum   • VARICOSE VEIN SURGERY      Family History   Problem Relation Age of Onset   • Cancer Mother            • COPD Mother            • Hypertension Mother            • Heart attack Father    • Coronary artery disease Father    • Hyperlipidemia Father            • Hypertension Father            • COPD Father            • Stroke Father    • Depression Father    • Mental illness Father    • Stroke Maternal Grandfather            • Diabetes Maternal Grandmother              Social History     Tobacco Use   • Smoking status: Never   • Smokeless tobacco:  "Never   Substance Use Topics   • Alcohol use: Not Currently       Health Maintenance Due   Topic Date Due   • COLORECTAL CANCER SCREENING  Never done   • HEPATITIS C SCREENING  Never done        Immunization History   Administered Date(s) Administered   • COVID-19 (PFIZER) PURPLE CAP 06/15/2021, 07/13/2021       Allergies   Allergen Reactions   • Statins Myalgia        Medications:  Current Outpatient Medications on File Prior to Visit   Medication Sig   • Breo Ellipta 200-25 MCG/INH inhaler Inhale 1 puff Daily.   • levocetirizine (XYZAL) 5 MG tablet Take 1 tablet by mouth Daily.   • promethazine-dextromethorphan (PROMETHAZINE-DM) 6.25-15 MG/5ML syrup Take 5 mL by mouth 4 (Four) Times a Day As Needed for Cough.   • [DISCONTINUED] benzonatate (Tessalon Perles) 100 MG capsule Take 2 capsules by mouth 3 (Three) Times a Day As Needed for Cough.   • [DISCONTINUED] hydroCHLOROthiazide (HYDRODIURIL) 12.5 MG tablet Take 1 tablet by mouth Daily.     No current facility-administered medications on file prior to visit.       Vital Signs:   /84 (BP Location: Right arm, Patient Position: Sitting)   Pulse 73   Temp 97.8 °F (36.6 °C) (Oral)   Ht 165.1 cm (65\")   Wt 124 kg (274 lb)   SpO2 96%   BMI 45.60 kg/m²       Physical Exam:  Physical Exam  Constitutional:       General: She is not in acute distress.     Appearance: She is normal weight. She is not ill-appearing.   HENT:      Head: Normocephalic.      Right Ear: Tympanic membrane normal. There is no impacted cerumen.      Left Ear: Tympanic membrane normal. There is no impacted cerumen.      Mouth/Throat:      Mouth: Mucous membranes are moist.      Pharynx: Oropharynx is clear.   Eyes:      Extraocular Movements: Extraocular movements intact.      Pupils: Pupils are equal, round, and reactive to light.   Neck:      Vascular: No carotid bruit.   Cardiovascular:      Rate and Rhythm: Normal rate and regular rhythm.      Pulses: Normal pulses.      Heart sounds: No " murmur heard.  Pulmonary:      Effort: Pulmonary effort is normal.      Breath sounds: No wheezing, rhonchi or rales.   Abdominal:      General: Bowel sounds are normal.      Palpations: Abdomen is soft.      Tenderness: There is no abdominal tenderness.      Hernia: There is no hernia in the left inguinal area or right inguinal area.   Genitourinary:     Labia:         Right: No rash.         Left: No rash.       Urethra: No prolapse.      Vagina: Normal.      Uterus: Absent.       Rectum: Guaiac result negative. No mass, tenderness, anal fissure, external hemorrhoid or internal hemorrhoid. Normal anal tone.      Comments: Pelvic exam neg for findings  Musculoskeletal:         General: No swelling. Normal range of motion.      Cervical back: No rigidity or tenderness.   Lymphadenopathy:      Cervical: No cervical adenopathy.   Skin:     General: Skin is warm and dry.   Neurological:      Mental Status: She is alert.      Gait: Gait normal.   Psychiatric:         Mood and Affect: Mood normal.         Behavior: Behavior normal.         Judgment: Judgment normal.         Result Review      The following data was reviewed by Swetha Krishna MD on 07/15/2021.  Lab Results   Component Value Date    WBC 5.85 12/12/2022    HGB 13.9 12/12/2022    HCT 42.7 12/12/2022    MCV 89.3 12/12/2022     12/12/2022     Lab Results   Component Value Date    GLUCOSE 99 12/12/2022    BUN 13 12/12/2022    CREATININE 0.66 12/12/2022    EGFRIFNONA 86 07/07/2021    BCR 19.7 12/12/2022    K 4.3 12/12/2022    CO2 26.3 12/12/2022    CALCIUM 9.2 12/12/2022    ALBUMIN 4.40 12/12/2022    AST 17 12/12/2022    ALT 18 12/12/2022     Lab Results   Component Value Date    CHOL 219 (H) 03/08/2023    TRIG 157 (H) 03/08/2023    HDL 51 03/08/2023     (H) 03/08/2023     Lab Results   Component Value Date    TSH 1.520 07/07/2021     No results found for: HGBA1C  No results found for: PSA             ECG 12 Lead    Date/Time: 3/13/2023 12:24  PM  Performed by: Swetha Krishna MD  Authorized by: Swetha Krishna MD   Rhythm: sinus rhythm  Rate: normal  Conduction: conduction normal  ST Segments: ST segments normal  T inversion: V1  QRS axis: normal    Clinical impression: normal ECG  Comments: NSR. HKM                Assessment and Plan: Diagnoses and all orders for this visit:    1. Well woman exam (Primary)  Comments:  No Pap performed, status post hysterectomy, breast exam within normal limits, orders given for mammogram/colonoscopy/DEXA    2. Annual physical exam    3. Palpitation  Comments:  Normal EKG.  We will start her on metoprolol for her blood pressure and to help with her intermittent palpitations.  We will stop HCTZ  Orders:  -     ECG 12 Lead  -     metoprolol succinate XL (TOPROL-XL) 25 MG 24 hr tablet; Take 1 tablet by mouth Daily.  Dispense: 30 tablet; Refill: 5    4. Hypercholesterolemia  Comments:  She is not tolerating statins, we will change her over to niacin 500 mg daily and she is aware of SE facial flushing.  We will follow-up for repeat labs in 3 mo  Orders:  -     niacin (Niaspan) 500 MG CR tablet; Take 1 tablet by mouth Every Night.  Dispense: 90 tablet; Refill: 1    5. Primary hypertension  Comments:  Not well controlled.  Will stop HCTZ and change her over to metoprolol, she is to avoid salt in diet and work on daily 30-min exercise and healthy low carb diet  Orders:  -     metoprolol succinate XL (TOPROL-XL) 25 MG 24 hr tablet; Take 1 tablet by mouth Daily.  Dispense: 30 tablet; Refill: 5    6. Allergic rhinitis, unspecified seasonality, unspecified trigger  Comments:  Referral in place for her to get further work-up with allergy testing  Orders:  -     Ambulatory Referral to Allergy    7. Colon cancer screening  Comments:  Colonoscopy ordered  Orders:  -     Ambulatory Referral For Screening Colonoscopy  -     POC Occult Blood Stool    8. Encounter for screening mammogram for breast cancer  -     Mammo Screening  Digital Tomosynthesis Bilateral With CAD; Future    9. Encounter for immunization  Comments:  Recommend Shingrix/Tdap/yearly flu vaccination and COVID booster.  She defers all vaccinations today    10. Postmenopausal state  Comments:  DEXA ordered  Orders:  -     DEXA Bone Density Axial; Future    11. Encounter for screening for other viral diseases  Comments:  Hepatitis C lab ordered  Orders:  -     Hepatitis C antibody; Future    12. Morbid (severe) obesity due to excess calories (HCC)  Comments:  counseled on diet and exercise and need for wt loss for her health, she defers dietician referral             Follow Up   Return in about 6 months (around 9/13/2023) for Recheck.  Patient was given instructions and counseling regarding her condition or for health maintenance advice. Please see specific information pulled into the AVS if appropriate.

## 2023-03-23 ENCOUNTER — TELEPHONE (OUTPATIENT)
Dept: FAMILY MEDICINE CLINIC | Age: 63
End: 2023-03-23
Payer: COMMERCIAL

## 2023-03-30 ENCOUNTER — TELEPHONE (OUTPATIENT)
Dept: FAMILY MEDICINE CLINIC | Age: 63
End: 2023-03-30
Payer: COMMERCIAL

## 2023-04-12 ENCOUNTER — OFFICE VISIT (OUTPATIENT)
Dept: FAMILY MEDICINE CLINIC | Age: 63
End: 2023-04-12
Payer: COMMERCIAL

## 2023-04-12 VITALS
SYSTOLIC BLOOD PRESSURE: 160 MMHG | HEART RATE: 80 BPM | WEIGHT: 277 LBS | TEMPERATURE: 98 F | OXYGEN SATURATION: 97 % | DIASTOLIC BLOOD PRESSURE: 92 MMHG | BODY MASS INDEX: 46.15 KG/M2 | HEIGHT: 65 IN

## 2023-04-12 DIAGNOSIS — R05.1 ACUTE COUGH: ICD-10-CM

## 2023-04-12 DIAGNOSIS — J02.9 SORE THROAT: ICD-10-CM

## 2023-04-12 DIAGNOSIS — J30.9 ALLERGIC RHINITIS, UNSPECIFIED SEASONALITY, UNSPECIFIED TRIGGER: ICD-10-CM

## 2023-04-12 DIAGNOSIS — J01.40 ACUTE NON-RECURRENT PANSINUSITIS: Primary | ICD-10-CM

## 2023-04-12 DIAGNOSIS — I10 PRIMARY HYPERTENSION: ICD-10-CM

## 2023-04-12 LAB
EXPIRATION DATE: NORMAL
EXPIRATION DATE: NORMAL
FLUAV AG UPPER RESP QL IA.RAPID: NOT DETECTED
FLUBV AG UPPER RESP QL IA.RAPID: NOT DETECTED
INTERNAL CONTROL: NORMAL
INTERNAL CONTROL: NORMAL
Lab: NORMAL
Lab: NORMAL
S PYO AG THROAT QL: NEGATIVE
SARS-COV-2 AG UPPER RESP QL IA.RAPID: NOT DETECTED

## 2023-04-12 PROCEDURE — 87081 CULTURE SCREEN ONLY: CPT

## 2023-04-12 RX ORDER — LEVOCETIRIZINE DIHYDROCHLORIDE 5 MG/1
5 TABLET, FILM COATED ORAL DAILY
Qty: 30 TABLET | Refills: 0 | Status: SHIPPED | OUTPATIENT
Start: 2023-04-12

## 2023-04-12 RX ORDER — ALBUTEROL SULFATE 90 UG/1
1 AEROSOL, METERED RESPIRATORY (INHALATION) EVERY 6 HOURS PRN
Qty: 6.7 G | Refills: 0 | Status: SHIPPED | OUTPATIENT
Start: 2023-04-12

## 2023-04-12 RX ORDER — BENZONATATE 200 MG/1
200 CAPSULE ORAL 3 TIMES DAILY PRN
Qty: 21 CAPSULE | Refills: 0 | Status: SHIPPED | OUTPATIENT
Start: 2023-04-12

## 2023-04-12 RX ORDER — AMOXICILLIN AND CLAVULANATE POTASSIUM 875; 125 MG/1; MG/1
1 TABLET, FILM COATED ORAL 2 TIMES DAILY
Qty: 20 TABLET | Refills: 0 | Status: SHIPPED | OUTPATIENT
Start: 2023-04-12 | End: 2023-04-22

## 2023-04-12 NOTE — PROGRESS NOTES
"Subjective     CHIEF COMPLAINT    Chief Complaint   Patient presents with   • Cough     Sinus congestion, headache, sore throat, white spots on tonsils x 1 wk     History of Present Illness  Patient is a 62-year-old female who presents to the clinic today with complaints of congestion, sinus pressure and pain, sore throat, cough and mild wheezing.  She states that this is a bad time of year for her with allergies.  She is on Xyzal.  She cannot tolerate Flonase.  Her symptoms have been present for about a week with the drainage, sinus congestion and pressure but yesterday her sore throat started and she noticed some white spots in her throat last night.  She took a COVID test at home yesterday that was negative.  She denies any specific fever but does state that her head feels warm.  No known exposure to any illnesses.  She is on Breo for allergy induced asthma.    Notably, her blood pressure is elevated in office today.  She has been taking decongestants at home.  She is on metoprolol which she took right before coming into the office.  She does check her blood pressure at home and it usually runs with a systolic of 120-139.    Review of Systems   Constitutional: Positive for fever (\"head feels warm\").   HENT: Positive for congestion, sinus pressure, sinus pain and sore throat.    Respiratory: Positive for cough and wheezing. Negative for shortness of breath.    Cardiovascular: Negative for chest pain.     Allergies   Allergen Reactions   • Statins Myalgia     Current Outpatient Medications on File Prior to Visit   Medication Sig Dispense Refill   • Breo Ellipta 200-25 MCG/INH inhaler Inhale 1 puff Daily. 1 each 2   • metoprolol succinate XL (TOPROL-XL) 25 MG 24 hr tablet Take 1 tablet by mouth Daily. 30 tablet 5   • niacin (Niaspan) 500 MG CR tablet Take 1 tablet by mouth Every Night. 90 tablet 1   • [DISCONTINUED] levocetirizine (XYZAL) 5 MG tablet Take 1 tablet by mouth Daily.     • [DISCONTINUED] " "promethazine-dextromethorphan (PROMETHAZINE-DM) 6.25-15 MG/5ML syrup Take 5 mL by mouth 4 (Four) Times a Day As Needed for Cough. 180 mL 0     No current facility-administered medications on file prior to visit.     /92   Pulse 80   Temp 98 °F (36.7 °C) (Oral)   Ht 165.1 cm (65\")   Wt 126 kg (277 lb)   SpO2 97%   BMI 46.10 kg/m²      Objective     Physical Exam  Vitals and nursing note reviewed.   Constitutional:       General: She is not in acute distress.     Appearance: Normal appearance. She is not ill-appearing.   HENT:      Head: Normocephalic.      Right Ear: Tympanic membrane, ear canal and external ear normal.      Left Ear: Tympanic membrane, ear canal and external ear normal.      Nose:      Right Sinus: Maxillary sinus tenderness and frontal sinus tenderness present.      Left Sinus: Maxillary sinus tenderness and frontal sinus tenderness present.      Mouth/Throat:      Lips: Pink.      Mouth: Mucous membranes are moist.      Pharynx: Oropharynx is clear. Uvula midline. Posterior oropharyngeal erythema present. No pharyngeal swelling, oropharyngeal exudate or uvula swelling.      Tonsils: Tonsillar exudate present.   Eyes:      Extraocular Movements: Extraocular movements intact.      Pupils: Pupils are equal, round, and reactive to light.   Cardiovascular:      Rate and Rhythm: Normal rate and regular rhythm.      Heart sounds: Normal heart sounds. No murmur heard.  Pulmonary:      Effort: Pulmonary effort is normal. No accessory muscle usage or respiratory distress.      Breath sounds: Normal breath sounds. No wheezing or rhonchi.   Musculoskeletal:      Cervical back: Normal range of motion.   Lymphadenopathy:      Cervical: No cervical adenopathy.   Skin:     General: Skin is warm and dry.   Neurological:      General: No focal deficit present.      Mental Status: She is alert and oriented to person, place, and time.   Psychiatric:         Mood and Affect: Mood and affect normal.         " Behavior: Behavior normal.          Lab Results (last 24 hours)     Procedure Component Value Units Date/Time    POCT SARS-CoV-2 Antigen ALANA + Flu [[id number]] Collected: 04/12/23 0912    Specimen: Swab Updated: 04/12/23 0912     SARS Antigen Not Detected     Influenza A Antigen ALANA Not Detected     Influenza B Antigen ALANA Not Detected     Internal Control Passed     Lot Number [id number]     Expiration Date 12/19/23    POCT rapid strep A [[id number]] Collected: 04/12/23 0912    Specimen: Swab Updated: 04/12/23 0913     Rapid Strep A Screen Negative     Internal Control Passed     Lot Number [id number]     Expiration Date 9/30/24           Diagnoses and all orders for this visit:    1. Acute non-recurrent pansinusitis (Primary)  -     amoxicillin-clavulanate (Augmentin) 875-125 MG per tablet; Take 1 tablet by mouth 2 (Two) Times a Day for 10 days.  Dispense: 20 tablet; Refill: 0    2. Acute cough  Comments:  Negative rapid COVID and flu  Orders:  -     POCT SARS-CoV-2 Antigen ALANA + Flu  -     POCT rapid strep A  -     benzonatate (TESSALON) 200 MG capsule; Take 1 capsule by mouth 3 (Three) Times a Day As Needed for Cough.  Dispense: 21 capsule; Refill: 0    3. Sore throat  Comments:  Negative rapid strep, culture sent and pending.    Orders:  -     POCT rapid strep A  -     Beta Strep Culture, Throat - , Throat; Future  -     Beta Strep Culture, Throat - Swab, Throat    4. Allergic rhinitis, unspecified seasonality, unspecified trigger  Comments:  Patient requested refill of Xyzal, short supply provided.  Orders:  -     levocetirizine (XYZAL) 5 MG tablet; Take 1 tablet by mouth Daily.  Dispense: 30 tablet; Refill: 0    5. Primary hypertension  Comments:  Blood pressure elevated in office today.  Advised patient to avoid decongestants, monitor at home and notify clinic if it remains elevated.  BP log provided.    Other orders  -     albuterol sulfate  (90 Base) MCG/ACT inhaler; Inhale 1 puff Every 6 (Six) Hours As  Needed for Wheezing or Shortness of Air.  Dispense: 6.7 g; Refill: 0      Patient is negative for COVID, flu and strep on rapid testing today.  Evidence of bacterial infection given duration of symptoms.  We will treat with Augmentin as above.  Recommend increase fluids, rest.  Patient provided with albuterol inhaler as needed given her complaints of wheezing at home.  Return to clinic if symptoms worsen or do not improve.  Proceed to ER for any shortness of breath, chest pain. Patient voiced understanding of all instructions and had no further questions at this time.       Follow-up:  Return if symptoms worsen or fail to improve.  Patient was given instructions and counseling regarding her condition or for health maintenance advice. Please see specific information pulled into the AVS if appropriate.

## 2023-04-14 LAB — BACTERIA SPEC AEROBE CULT: NORMAL

## 2023-06-05 ENCOUNTER — HOSPITAL ENCOUNTER (OUTPATIENT)
Dept: MAMMOGRAPHY | Facility: HOSPITAL | Age: 63
Discharge: HOME | End: 2023-06-05
Payer: COMMERCIAL

## 2023-06-05 ENCOUNTER — HOSPITAL ENCOUNTER (OUTPATIENT)
Dept: BONE DENSITY | Facility: HOSPITAL | Age: 63
Discharge: HOME | End: 2023-06-05
Payer: COMMERCIAL

## 2023-06-05 DIAGNOSIS — Z12.31 ENCOUNTER FOR SCREENING MAMMOGRAM FOR BREAST CANCER: ICD-10-CM

## 2023-06-05 DIAGNOSIS — Z78.0 POSTMENOPAUSAL STATE: ICD-10-CM

## 2023-06-05 PROCEDURE — 77080 DXA BONE DENSITY AXIAL: CPT

## 2023-06-05 PROCEDURE — 77063 BREAST TOMOSYNTHESIS BI: CPT

## 2023-06-05 PROCEDURE — 77067 SCR MAMMO BI INCL CAD: CPT

## 2023-11-28 ENCOUNTER — TELEPHONE (OUTPATIENT)
Dept: FAMILY MEDICINE CLINIC | Age: 63
End: 2023-11-28
Payer: COMMERCIAL

## 2023-11-28 NOTE — PROGRESS NOTES
"Chief Complaint  Ankle Pain (Pt states that she rolled her (L) ankle/side of her foot about 3 weeks ago. Pain is still ongoing./)    Subjective          Radha Herr presents to [facility]  Leg Pain   The incident occurred more than 1 week ago. Incident location: walking down the road. The injury mechanism was an inversion injury. The pain is present in the left ankle and left foot. Quality: sharp to achy. The pain is at a severity of 10/10. The pain is severe. The pain has been Constant since onset. Associated symptoms include tingling (occasional). Pertinent negatives include no inability to bear weight, loss of motion, loss of sensation or numbness. She has tried NSAIDs, ice, heat, immobilization and rest for the symptoms. The treatment provided no relief.       Objective   Vital Signs:   /77 (BP Location: Right arm, Patient Position: Sitting)   Pulse 79   Ht 165.1 cm (65\")   Wt 113 kg (249 lb 9.6 oz)   BMI 41.54 kg/m²     Physical Exam  Constitutional:       General: She is not in acute distress.     Appearance: Normal appearance.   HENT:      Head: Normocephalic.   Eyes:      Pupils: Pupils are equal, round, and reactive to light.      Visual Fields: Right eye visual fields normal and left eye visual fields normal.   Neck:      Trachea: Trachea normal.   Cardiovascular:      Rate and Rhythm: Normal rate and regular rhythm.      Heart sounds: Normal heart sounds.   Pulmonary:      Effort: Pulmonary effort is normal.      Breath sounds: Normal breath sounds and air entry.   Musculoskeletal:      Right lower leg: No edema.      Left lower leg: No edema.      Left ankle: Swelling present. No ecchymosis. No tenderness. Normal pulse.      Left foot: Tenderness present. No swelling. Normal pulse.   Skin:     General: Skin is warm and dry.   Neurological:      Mental Status: She is alert and oriented to person, place, and time.   Psychiatric:         Mood and Affect: Mood " and affect normal.         Behavior: Behavior normal.         Thought Content: Thought content normal.        Result Review :   The following data was reviewed by: KUNAL Wayne on 11/29/2023:  Comprehensive Metabolic Panel (12/12/2022 07:05)                  Assessment and Plan    Diagnoses and all orders for this visit:    1. Left ankle swelling (Primary)  -     XR Ankle 3+ View Left; Future  -     XR Foot 3+ View Left; Future  -     meloxicam (Mobic) 7.5 MG tablet; Take 1 tablet by mouth Daily.  Dispense: 30 tablet; Refill: 0    2. Left foot pain  -     XR Ankle 3+ View Left; Future  -     XR Foot 3+ View Left; Future  -     meloxicam (Mobic) 7.5 MG tablet; Take 1 tablet by mouth Daily.  Dispense: 30 tablet; Refill: 0    She does have swelling that is noticeable in her left ankle.her pain is not improving.  We will give her a boot.  Also take x-rays of her left foot and ankle.  Also give her meloxicam.      Follow Up   Return if symptoms worsen or fail to improve.  Patient was given instructions and counseling regarding her condition or for health maintenance advice. Please see specific information pulled into the AVS if appropriate.

## 2023-11-28 NOTE — TELEPHONE ENCOUNTER
"Caller: Radha Herr \"Anaya\"    Relationship: Self    Best call back number: [phone]    What orders are you requesting (i.e. lab or imaging): XRAY    In what timeframe would the patient need to come in: TODAY IF POSSIBLE.     Where will you receive your lab/imaging services: IN OFFICE     Additional notes: PATIENT TURNED HER FOOT ABOUT 3 WEEKS AGO AND IT IS STILL REALLY PAINFUL, WANTED TO MAKE SURE THERE IS NOTHING BROKE OR FACTURED.  HAS AN APPOINTMENT TOMORROW WITH ALPA RODRÍGUEZ AND WANTED TO HAVE THE XRAY BEFORE SO THEY CAN SEE WHAT IS GOING ON.         "

## 2023-11-28 NOTE — TELEPHONE ENCOUNTER
Advised that PCP is out if the office and ot will need to be assessed so that accurate x-rays can be ordered at tomorrow's appt

## 2023-11-29 ENCOUNTER — OFFICE VISIT (OUTPATIENT)
Dept: FAMILY MEDICINE CLINIC | Age: 63
End: 2023-11-29
Payer: COMMERCIAL

## 2023-11-29 ENCOUNTER — HOSPITAL ENCOUNTER (OUTPATIENT)
Dept: GENERAL RADIOLOGY | Facility: HOSPITAL | Age: 63
Discharge: HOME | End: 2023-11-29
Payer: COMMERCIAL

## 2023-11-29 VITALS
SYSTOLIC BLOOD PRESSURE: 173 MMHG | DIASTOLIC BLOOD PRESSURE: 77 MMHG | HEART RATE: 79 BPM | BODY MASS INDEX: 41.59 KG/M2 | WEIGHT: 249.6 LBS | HEIGHT: 65 IN

## 2023-11-29 DIAGNOSIS — M25.472 LEFT ANKLE SWELLING: ICD-10-CM

## 2023-11-29 DIAGNOSIS — M79.672 LEFT FOOT PAIN: ICD-10-CM

## 2023-11-29 DIAGNOSIS — M25.472 LEFT ANKLE SWELLING: Primary | ICD-10-CM

## 2023-11-29 PROCEDURE — 99213 OFFICE O/P EST LOW 20 MIN: CPT | Performed by: NURSE PRACTITIONER

## 2023-11-29 RX ORDER — MELOXICAM 7.5 MG/1
7.5 TABLET ORAL DAILY
Qty: 30 TABLET | Refills: 0 | Status: SHIPPED | OUTPATIENT
Start: 2023-11-29

## 2024-01-02 ENCOUNTER — HOSPITAL ENCOUNTER (OUTPATIENT)
Dept: GENERAL RADIOLOGY | Facility: HOSPITAL | Age: 64
Discharge: HOME | End: 2024-01-02
Admitting: NURSE PRACTITIONER
Payer: COMMERCIAL

## 2024-01-02 PROCEDURE — 73630 X-RAY EXAM OF FOOT: CPT

## 2024-01-02 PROCEDURE — 73610 X-RAY EXAM OF ANKLE: CPT

## 2024-02-07 DIAGNOSIS — R05.1 ACUTE COUGH: ICD-10-CM

## 2024-02-07 RX ORDER — BENZONATATE 200 MG/1
200 CAPSULE ORAL 3 TIMES DAILY PRN
Qty: 21 CAPSULE | Refills: 0 | OUTPATIENT
Start: 2024-02-07

## 2024-02-09 ENCOUNTER — OFFICE VISIT (OUTPATIENT)
Dept: PODIATRY | Facility: CLINIC | Age: 64
End: 2024-02-09
Payer: COMMERCIAL

## 2024-02-09 VITALS
OXYGEN SATURATION: 95 % | DIASTOLIC BLOOD PRESSURE: 91 MMHG | TEMPERATURE: 97 F | BODY MASS INDEX: 42.32 KG/M2 | HEIGHT: 65 IN | WEIGHT: 254 LBS | SYSTOLIC BLOOD PRESSURE: 180 MMHG | HEART RATE: 85 BPM

## 2024-02-09 DIAGNOSIS — S93.492A SPRAIN OF ANTERIOR TALOFIBULAR LIGAMENT OF LEFT ANKLE, INITIAL ENCOUNTER: ICD-10-CM

## 2024-02-09 DIAGNOSIS — M76.72 PERONEUS LONGUS TENDINITIS, LEFT: Primary | ICD-10-CM

## 2024-02-09 RX ORDER — METHYLPREDNISOLONE 4 MG/1
TABLET ORAL
Qty: 21 TABLET | Refills: 0 | Status: SHIPPED | OUTPATIENT
Start: 2024-02-09

## 2024-02-09 RX ORDER — METHYLPREDNISOLONE 4 MG/1
TABLET ORAL
Qty: 21 TABLET | Refills: 0 | Status: SHIPPED | OUTPATIENT
Start: 2024-02-09 | End: 2024-02-09 | Stop reason: SDUPTHER

## 2024-02-09 NOTE — PROGRESS NOTES
[facility] - PODIATRY    Today's Date: 24    Patient Name: Radha Herr  MRN: [id number]  CSN: [id number]  PCP: Swetha Krishna MD,   Referring Provider: Referring, Self    SUBJECTIVE     Chief Complaint   Patient presents with    Left Ankle - Pain, Establish Care     State rolled ankle when her foot fell off edge of the road     Saw NP Gordon exam  using ankle rap Mortrin and ice. xray was ordered at that time but due to deductible did not have  States pain did not get any better  Xray done 24 called ortho who referred patient here    Left Foot - Pain, Establish Care     HPI: Radha Herr, a 63 y.o.female, presents to clinic.    Patient is a 63-year-old female presenting with left foot and ankle pain.  Patient states she rolled her ankle in November and it has not gotten better.  Patient states it improved initially but she has soreness on the outside bottom of her foot that runs across her foot.  Patient states that she has not tried any real treatment for this.  She had x-rays that showed no fractures.    Past Medical History:   Diagnosis Date    Allergic Began in my 30s progressively have gotten worse    Dust, pollen, mold, grass clippings, trees    Allergic rhinitis     Asymptomatic menopausal state     Clotting disorder nose bleeds    Not enough skin covering my membranes    Cough     COVID-19     Diverticulosis     Essential (primary) hypertension     Headache sinus/allergies    History of medical problems 23 & 23    had varithena vein surgery    Mild intermittent asthma     allergy induced, uses an inhaler 1x day    Other dorsalgia     Overflow incontinence      Past Surgical History:   Procedure Laterality Date    BILATERAL BREAST REDUCTION  2017    BREAST SURGERY  2017     SECTION      COLONOSCOPY      HYSTERECTOMY      LUMBAR DISCECTOMY FUSION INSTRUMENTATION      NASAL SEPTUM SURGERY      SINUS SURGERY      Deviated septum    VARICOSE VEIN SURGERY        Family History   Problem Relation Age of Onset    Cancer Mother             COPD Mother             Hypertension Mother             Heart attack Father     Coronary artery disease Father     Hyperlipidemia Father             Hypertension Father             COPD Father             Stroke Father     Depression Father     Mental illness Father     Stroke Maternal Grandfather             Diabetes Maternal Grandmother              Social History     Socioeconomic History    Marital status:     Number of children: 2   Tobacco Use    Smoking status: Never    Smokeless tobacco: Never   Vaping Use    Vaping Use: Never used   Substance and Sexual Activity    Alcohol use: Not Currently    Drug use: Never    Sexual activity: Not Currently     Partners: Male     Allergies   Allergen Reactions    Statins Myalgia     Current Outpatient Medications   Medication Sig Dispense Refill    benzonatate (TESSALON) 200 MG capsule Take 1 capsule by mouth 3 (Three) Times a Day As Needed for Cough. 21 capsule 0    methylPREDNISolone (MEDROL) 4 MG dose pack Take by mouth as directed on package. 21 tablet 0    albuterol sulfate  (90 Base) MCG/ACT inhaler Inhale 1 puff Every 6 (Six) Hours As Needed for Wheezing or Shortness of Air. 6.7 g 0    Breo Ellipta 200-25 MCG/INH inhaler Inhale 1 puff Daily. 1 each 2    metoprolol succinate XL (TOPROL-XL) 25 MG 24 hr tablet Take 1 tablet by mouth Daily. (Patient not taking: Reported on 2023) 30 tablet 5    niacin (Niaspan) 500 MG CR tablet Take 1 tablet by mouth Every Night. (Patient not taking: Reported on 2023) 90 tablet 1     No current facility-administered medications for this visit.     Review of Systems   Constitutional: Negative.    HENT: Negative.     Eyes: Negative.    Respiratory: Negative.     Cardiovascular: Negative.    Gastrointestinal: Negative.    Endocrine: Negative.    Genitourinary: Negative.     Musculoskeletal: Negative.         Left foot and ankle pain   Skin: Negative.    Allergic/Immunologic: Negative.    Neurological: Negative.    Hematological: Negative.    Psychiatric/Behavioral: Negative.     All other systems reviewed and are negative.      OBJECTIVE     Vitals:    02/09/24 0815   BP: 180/91   Pulse: 85   Temp: 97 °F (36.1 °C)   SpO2: 95%       WBC   Date Value Ref Range Status   12/12/2022 5.85 3.40 - 10.80 10*3/mm3 Final     RBC   Date Value Ref Range Status   12/12/2022 4.78 3.77 - 5.28 10*6/mm3 Final     Hemoglobin   Date Value Ref Range Status   12/12/2022 13.9 12.0 - 15.9 g/dL Final     Hematocrit   Date Value Ref Range Status   12/12/2022 42.7 34.0 - 46.6 % Final     MCV   Date Value Ref Range Status   12/12/2022 89.3 79.0 - 97.0 fL Final     MCH   Date Value Ref Range Status   12/12/2022 29.1 26.6 - 33.0 pg Final     MCHC   Date Value Ref Range Status   12/12/2022 32.6 31.5 - 35.7 g/dL Final     RDW   Date Value Ref Range Status   12/12/2022 13.2 12.3 - 15.4 % Final     RDW-SD   Date Value Ref Range Status   12/12/2022 43.6 37.0 - 54.0 fl Final     MPV   Date Value Ref Range Status   12/12/2022 10.0 6.0 - 12.0 fL Final     Platelets   Date Value Ref Range Status   12/12/2022 295 140 - 450 10*3/mm3 Final     Neutrophils Absolute   Date Value Ref Range Status   07/07/2021 2.61 1.70 - 7.00 10*3/mm3 Final   07/07/2021 3.00 1.70 - 7.00 10*3/mm3 Final     Eosinophils Absolute   Date Value Ref Range Status   07/07/2021 0.09 0.00 - 0.40 10*3/mm3 Final   07/07/2021 0.23 0.00 - 0.40 10*3/mm3 Final     Basophils Absolute   Date Value Ref Range Status   07/07/2021 0.14 0.00 - 0.20 10*3/mm3 Final         Lab Results   Component Value Date    GLUCOSE 99 12/12/2022    BUN 13 12/12/2022    CREATININE 0.66 12/12/2022    EGFRIFNONA 86 07/07/2021    BCR 19.7 12/12/2022    K 4.3 12/12/2022    CO2 26.3 12/12/2022    CALCIUM 9.2 12/12/2022    ALBUMIN 4.40 12/12/2022    AST 17 12/12/2022    ALT 18 12/12/2022        Patient seen in no apparent distress.      PHYSICAL EXAM:     Foot/Ankle Exam    GENERAL  Appearance:  appears stated age  Orientation:  AAOx3  Affect:  appropriate  Gait:  unimpaired  Assistance:  independent  Right shoe gear: casual shoe  Left shoe gear: casual shoe    VASCULAR     Right Foot Vascularity   Normal vascular exam    Dorsalis pedis:  2+  Posterior tibial:  2+  Skin temperature:  warm  Edema grading:  None  CFT:  < 3 seconds  Pedal hair growth:  Present  Varicosities:  none     Left Foot Vascularity   Normal vascular exam    Dorsalis pedis:  2+  Posterior tibial:  2+  Skin temperature:  warm  Edema grading:  None  CFT:  < 3 seconds  Pedal hair growth:  Present  Varicosities:  none     NEUROLOGIC     Right Foot Neurologic   Normal sensation    Light touch sensation: normal  Vibratory sensation: normal  Hot/Cold sensation: normal  Protective Sensation using Mayer-George Monofilament:   Sites intact: 10  Sites tested: 10     Left Foot Neurologic   Normal sensation    Light touch sensation: normal  Vibratory sensation: normal  Hot/Cold sensation:  normal  Protective Sensation using Mayer-George Monofilament:   Sites intact: 10  Sites tested: 10    MUSCULOSKELETAL     Left Foot Musculoskeletal   Tenderness:  peroneal tendon tenderness (Pain to peroneus longus)    MUSCLE STRENGTH     Right Foot Muscle Strength   Foot dorsiflexion:  4  Foot plantar flexion:  4  Foot inversion:  4  Foot eversion:  4     Left Foot Muscle Strength   Foot dorsiflexion:  4  Foot plantar flexion:  4  Foot inversion:  4  Foot eversion:  4    RANGE OF MOTION     Right Foot Range of Motion   Foot and ankle ROM within normal limits       Left Foot Range of Motion   Foot and ankle ROM within normal limits      DERMATOLOGIC      Right Foot Dermatologic   Skin  Right foot skin is intact.      Left Foot Dermatologic   Skin  Left foot skin is intact.       RADIOLOGY:        No results found.    ASSESSMENT/PLAN     Diagnoses  and all orders for this visit:    1. Peroneus longus tendinitis, left (Primary)    2. Sprain of anterior talofibular ligament of left ankle, initial encounter    Other orders  -     Discontinue: methylPREDNISolone (MEDROL) 4 MG dose pack; Take as directed  Dispense: 21 tablet; Refill: 0  -     methylPREDNISolone (MEDROL) 4 MG dose pack; Take by mouth as directed on package.  Dispense: 21 tablet; Refill: 0    Patient to begin stretching exercises and icing in the evening as tolerated. Discussed compression therapy and resting the extremity.  Anti-inflammatory medication to begin taking if okay by PCP.    Weightbearing as tolerated in the boot    No improvement in 1 month will order MRI.    Medrol Dosepak prescribed    Comprehensive lower extremity examination and evaluation was performed.    Discussed findings and treatment plan including risks, benefits, and treatment options with patient in detail. Patient agreed with treatment plan.    Medications and allergies reviewed.  Reviewed available lab values along with other pertinent labs.  These were discussed with the patient.    An After Visit Summary was printed and given to the patient at discharge, including (if requested) any available informative/educational handouts regarding diagnosis, treatment, or medications. All questions were answered to patient/family satisfaction. Should symptoms fail to improve or worsen they agree to call or return to clinic or to go to the Emergency Department. Discussed the importance of following up with any needed screening tests/labs/specialist appointments and any requested follow-up recommended by me today. Importance of maintaining follow-up discussed and patient accepts that missed appointments can delay diagnosis and potentially lead to worsening of conditions.    Return in about 1 month (around 3/9/2024)., or sooner if acute issues arise.    This document has been electronically signed by Leighton Fletcher DPM on February  9, 2024 09:10 EST

## 2024-03-01 ENCOUNTER — OFFICE VISIT (OUTPATIENT)
Dept: PODIATRY | Facility: CLINIC | Age: 64
End: 2024-03-01
Payer: COMMERCIAL

## 2024-03-01 VITALS
OXYGEN SATURATION: 98 % | WEIGHT: 254 LBS | SYSTOLIC BLOOD PRESSURE: 150 MMHG | TEMPERATURE: 97.4 F | BODY MASS INDEX: 42.27 KG/M2 | HEART RATE: 80 BPM | DIASTOLIC BLOOD PRESSURE: 109 MMHG

## 2024-03-01 DIAGNOSIS — S93.492A SPRAIN OF ANTERIOR TALOFIBULAR LIGAMENT OF LEFT ANKLE, INITIAL ENCOUNTER: ICD-10-CM

## 2024-03-01 DIAGNOSIS — M76.72 PERONEUS LONGUS TENDINITIS, LEFT: Primary | ICD-10-CM

## 2024-03-01 NOTE — PROGRESS NOTES
[facility] - PODIATRY    Today's Date: 24    Patient Name: Radha Herr  MRN: [id number]  CSN: [id number]  PCP: Swetha Krishna MD,   Referring Provider: No ref. provider found    SUBJECTIVE     Chief Complaint   Patient presents with    Left Ankle - Follow-up, Pain     She is still having some pain and burning in ankle     HPI: Radha Herr, a 63 y.o.female, presents to clinic.    Patient is a 63-year-old female presenting with left foot and ankle pain.  Patient states she rolled her ankle in November and it has not gotten better.  Patient states it improved initially but she has soreness on the outside bottom of her foot that runs across her foot.  Patient states that she has not tried any real treatment for this.  She had x-rays that showed no fractures.    3/1/2024-patient is here for follow-up of his left foot.  Patient states it does feel better at times in the boot but it throws off her hips and cause her pain and other foot.  She is here for further treatment.    Past Medical History:   Diagnosis Date    Allergic Began in my 30s progressively have gotten worse    Dust, pollen, mold, grass clippings, trees    Allergic rhinitis     Asymptomatic menopausal state     Clotting disorder nose bleeds    Not enough skin covering my membranes    Cough     COVID-19     Diverticulosis     Essential (primary) hypertension     Headache sinus/allergies    History of medical problems 23 & 23    had varithena vein surgery    Mild intermittent asthma     allergy induced, uses an inhaler 1x day    Other dorsalgia     Overflow incontinence      Past Surgical History:   Procedure Laterality Date    BILATERAL BREAST REDUCTION  2017    BREAST SURGERY  2017     SECTION      COLONOSCOPY      HYSTERECTOMY      LUMBAR DISCECTOMY FUSION INSTRUMENTATION      NASAL SEPTUM SURGERY      SINUS SURGERY      Deviated septum    VARICOSE VEIN SURGERY       Family History   Problem Relation Age of  Onset    Cancer Mother             COPD Mother             Hypertension Mother             Heart attack Father     Coronary artery disease Father     Hyperlipidemia Father             Hypertension Father             COPD Father             Stroke Father     Depression Father     Mental illness Father     Stroke Maternal Grandfather             Diabetes Maternal Grandmother              Social History     Socioeconomic History    Marital status:     Number of children: 2   Tobacco Use    Smoking status: Never    Smokeless tobacco: Never   Vaping Use    Vaping Use: Never used   Substance and Sexual Activity    Alcohol use: Not Currently    Drug use: Never    Sexual activity: Not Currently     Partners: Male     Allergies   Allergen Reactions    Statins Myalgia     Current Outpatient Medications   Medication Sig Dispense Refill    albuterol sulfate  (90 Base) MCG/ACT inhaler Inhale 1 puff Every 6 (Six) Hours As Needed for Wheezing or Shortness of Air. 6.7 g 0    Breo Ellipta 200-25 MCG/INH inhaler Inhale 1 puff Daily. 1 each 2    niacin (Niaspan) 500 MG CR tablet Take 1 tablet by mouth Every Night. 90 tablet 1    benzonatate (TESSALON) 200 MG capsule Take 1 capsule by mouth 3 (Three) Times a Day As Needed for Cough. (Patient not taking: Reported on 3/1/2024) 21 capsule 0    methylPREDNISolone (MEDROL) 4 MG dose pack Take by mouth as directed on package. (Patient not taking: Reported on 3/1/2024) 21 tablet 0    metoprolol succinate XL (TOPROL-XL) 25 MG 24 hr tablet Take 1 tablet by mouth Daily. (Patient not taking: Reported on 2023) 30 tablet 5     No current facility-administered medications for this visit.     Review of Systems   Constitutional: Negative.    HENT: Negative.     Eyes: Negative.    Respiratory: Negative.     Cardiovascular: Negative.    Gastrointestinal: Negative.    Endocrine: Negative.    Genitourinary: Negative.     Musculoskeletal: Negative.         Left foot and ankle pain   Skin: Negative.    Allergic/Immunologic: Negative.    Neurological: Negative.    Hematological: Negative.    Psychiatric/Behavioral: Negative.     All other systems reviewed and are negative.      OBJECTIVE     Vitals:    03/01/24 0756   BP: (!) 150/109   Pulse: 80   Temp: 97.4 °F (36.3 °C)   SpO2: 98%       WBC   Date Value Ref Range Status   12/12/2022 5.85 3.40 - 10.80 10*3/mm3 Final     RBC   Date Value Ref Range Status   12/12/2022 4.78 3.77 - 5.28 10*6/mm3 Final     Hemoglobin   Date Value Ref Range Status   12/12/2022 13.9 12.0 - 15.9 g/dL Final     Hematocrit   Date Value Ref Range Status   12/12/2022 42.7 34.0 - 46.6 % Final     MCV   Date Value Ref Range Status   12/12/2022 89.3 79.0 - 97.0 fL Final     MCH   Date Value Ref Range Status   12/12/2022 29.1 26.6 - 33.0 pg Final     MCHC   Date Value Ref Range Status   12/12/2022 32.6 31.5 - 35.7 g/dL Final     RDW   Date Value Ref Range Status   12/12/2022 13.2 12.3 - 15.4 % Final     RDW-SD   Date Value Ref Range Status   12/12/2022 43.6 37.0 - 54.0 fl Final     MPV   Date Value Ref Range Status   12/12/2022 10.0 6.0 - 12.0 fL Final     Platelets   Date Value Ref Range Status   12/12/2022 295 140 - 450 10*3/mm3 Final     Neutrophils Absolute   Date Value Ref Range Status   07/07/2021 2.61 1.70 - 7.00 10*3/mm3 Final   07/07/2021 3.00 1.70 - 7.00 10*3/mm3 Final     Eosinophils Absolute   Date Value Ref Range Status   07/07/2021 0.09 0.00 - 0.40 10*3/mm3 Final   07/07/2021 0.23 0.00 - 0.40 10*3/mm3 Final     Basophils Absolute   Date Value Ref Range Status   07/07/2021 0.14 0.00 - 0.20 10*3/mm3 Final         Lab Results   Component Value Date    GLUCOSE 99 12/12/2022    BUN 13 12/12/2022    CREATININE 0.66 12/12/2022    EGFRIFNONA 86 07/07/2021    BCR 19.7 12/12/2022    K 4.3 12/12/2022    CO2 26.3 12/12/2022    CALCIUM 9.2 12/12/2022    ALBUMIN 4.40 12/12/2022    AST 17 12/12/2022    ALT 18  12/12/2022       Patient seen in no apparent distress.      PHYSICAL EXAM:     Foot/Ankle Exam    GENERAL  Appearance:  appears stated age  Orientation:  AAOx3  Affect:  appropriate  Gait:  unimpaired  Assistance:  independent  Right shoe gear: casual shoe  Left shoe gear: casual shoe    VASCULAR     Right Foot Vascularity   Normal vascular exam    Dorsalis pedis:  2+  Posterior tibial:  2+  Skin temperature:  warm  Edema grading:  None  CFT:  < 3 seconds  Pedal hair growth:  Present  Varicosities:  none     Left Foot Vascularity   Normal vascular exam    Dorsalis pedis:  2+  Posterior tibial:  2+  Skin temperature:  warm  Edema grading:  None  CFT:  < 3 seconds  Pedal hair growth:  Present  Varicosities:  none     NEUROLOGIC     Right Foot Neurologic   Normal sensation    Light touch sensation: normal  Vibratory sensation: normal  Hot/Cold sensation: normal  Protective Sensation using [place] Monofilament:   Sites intact: 10  Sites tested: 10     Left Foot Neurologic   Normal sensation    Light touch sensation: normal  Vibratory sensation: normal  Hot/Cold sensation:  normal  Protective Sensation using [place] Monofilament:   Sites intact: 10  Sites tested: 10    MUSCULOSKELETAL     Left Foot Musculoskeletal   Tenderness:  peroneal tendon tenderness (Pain to peroneus longus)    MUSCLE STRENGTH     Right Foot Muscle Strength   Foot dorsiflexion:  4  Foot plantar flexion:  4  Foot inversion:  4  Foot eversion:  4     Left Foot Muscle Strength   Foot dorsiflexion:  4  Foot plantar flexion:  4  Foot inversion:  4  Foot eversion:  4    RANGE OF MOTION     Right Foot Range of Motion   Foot and ankle ROM within normal limits       Left Foot Range of Motion   Foot and ankle ROM within normal limits      DERMATOLOGIC      Right Foot Dermatologic   Skin  Right foot skin is intact.      Left Foot Dermatologic   Skin  Left foot skin is intact.       RADIOLOGY:        No results found.    ASSESSMENT/PLAN      Diagnoses and all orders for this visit:    1. Peroneus longus tendinitis, left (Primary)  -     MRI Ankle Left Without Contrast; Future  -     MRI Foot Left Without Contrast; Future    2. Sprain of anterior talofibular ligament of left ankle, initial encounter  -     MRI Ankle Left Without Contrast; Future  -     MRI Foot Left Without Contrast; Future      Patient without improvement and has been bothering her for the last 4 to 5 months.  She has failed conservative treatments for this.  Will get an MRI for further evaluation.    Patient can wear the boot if it helps, discussed shoe up for contralateral side to help with stability    Comprehensive lower extremity examination and evaluation was performed.    Discussed findings and treatment plan including risks, benefits, and treatment options with patient in detail. Patient agreed with treatment plan.    Medications and allergies reviewed.  Reviewed available lab values along with other pertinent labs.  These were discussed with the patient.    An After Visit Summary was printed and given to the patient at discharge, including (if requested) any available informative/educational handouts regarding diagnosis, treatment, or medications. All questions were answered to patient/family satisfaction. Should symptoms fail to improve or worsen they agree to call or return to clinic or to go to the Emergency Department. Discussed the importance of following up with any needed screening tests/labs/specialist appointments and any requested follow-up recommended by me today. Importance of maintaining follow-up discussed and patient accepts that missed appointments can delay diagnosis and potentially lead to worsening of conditions.    No follow-ups on file., or sooner if acute issues arise.    This document has been electronically signed by Leighton Fletcher DPM on March 1, 2024 08:45 EST

## 2024-03-07 ENCOUNTER — TELEPHONE (OUTPATIENT)
Dept: PODIATRY | Facility: CLINIC | Age: 64
End: 2024-03-07
Payer: COMMERCIAL

## 2024-03-14 NOTE — TELEPHONE ENCOUNTER
"    Caller: Radha Herr \"Anaya\"    Relationship to patient: Self    Best call back number: [phone]    Patient is needing: PATIENT STATES THAT HER  IS HAVING A PROCEDURE ON 03/27/2024 AND THE PATIENT WANTED TO GET A PHONE CALL WITH THE MRI RESULTS AFTER MRI SCHEDULED ON 03/19/2024    " no

## 2024-03-19 ENCOUNTER — HOSPITAL ENCOUNTER (OUTPATIENT)
Dept: MRI IMAGING | Facility: HOSPITAL | Age: 64
Discharge: HOME | End: 2024-03-19
Payer: COMMERCIAL

## 2024-03-19 DIAGNOSIS — S93.492A SPRAIN OF ANTERIOR TALOFIBULAR LIGAMENT OF LEFT ANKLE, INITIAL ENCOUNTER: ICD-10-CM

## 2024-03-19 DIAGNOSIS — M76.72 PERONEUS LONGUS TENDINITIS, LEFT: ICD-10-CM

## 2024-03-19 PROCEDURE — 73721 MRI JNT OF LWR EXTRE W/O DYE: CPT

## 2024-03-19 PROCEDURE — 73718 MRI LOWER EXTREMITY W/O DYE: CPT

## 2024-03-22 ENCOUNTER — OFFICE VISIT (OUTPATIENT)
Dept: PODIATRY | Facility: CLINIC | Age: 64
End: 2024-03-22
Payer: COMMERCIAL

## 2024-03-22 VITALS
HEIGHT: 65 IN | WEIGHT: 254 LBS | HEART RATE: 74 BPM | OXYGEN SATURATION: 94 % | TEMPERATURE: 97.8 F | BODY MASS INDEX: 42.32 KG/M2

## 2024-03-22 DIAGNOSIS — S93.602S FOOT SPRAIN, LEFT, SEQUELA: ICD-10-CM

## 2024-03-22 DIAGNOSIS — M84.375D STRESS FRACTURE OF METATARSAL BONE OF LEFT FOOT WITH ROUTINE HEALING, SUBSEQUENT ENCOUNTER: Primary | ICD-10-CM

## 2024-03-22 NOTE — PROGRESS NOTES
[facility] - PODIATRY    Today's Date: 24    Patient Name: Radha Herr  MRN: [id number]  CSN: [id number]  PCP: Swetha Krishna MD,   Referring Provider: No ref. provider found    SUBJECTIVE     Chief Complaint   Patient presents with    Left Ankle - Follow-up, Pain, Results     MRI follow up      HPI: Radha Herr, a 63 y.o.female, presents to clinic.    Patient is a 63-year-old female presenting with left foot and ankle pain.  Patient states she rolled her ankle in November and it has not gotten better.  Patient states it improved initially but she has soreness on the outside bottom of her foot that runs across her foot.  Patient states that she has not tried any real treatment for this.  She had x-rays that showed no fractures.    3/22/2024-here for MRI follow-up.    Past Medical History:   Diagnosis Date    Allergic Began in my 30s progressively have gotten worse    Dust, pollen, mold, grass clippings, trees    Allergic rhinitis     Asymptomatic menopausal state     Clotting disorder nose bleeds    Not enough skin covering my membranes    Cough     COVID-19     Diverticulosis     Essential (primary) hypertension     Headache sinus/allergies    History of medical problems 23 & 23    had varithena vein surgery    Mild intermittent asthma     allergy induced, uses an inhaler 1x day    Other dorsalgia     Overflow incontinence     Peroneus longus tendinitis     Sprain of anterior talofibular ligament of left ankle      Past Surgical History:   Procedure Laterality Date    BILATERAL BREAST REDUCTION  2017    BREAST SURGERY  2017     SECTION      COLONOSCOPY      HYSTERECTOMY      LUMBAR DISCECTOMY FUSION INSTRUMENTATION      NASAL SEPTUM SURGERY      SINUS SURGERY      Deviated septum    VARICOSE VEIN SURGERY       Family History   Problem Relation Age of Onset    Cancer Mother             COPD Mother             Hypertension Mother              Heart attack Father     Coronary artery disease Father     Hyperlipidemia Father             Hypertension Father             COPD Father             Stroke Father     Depression Father     Mental illness Father     Stroke Maternal Grandfather             Diabetes Maternal Grandmother              Social History     Socioeconomic History    Marital status:     Number of children: 2   Tobacco Use    Smoking status: Never    Smokeless tobacco: Never   Vaping Use    Vaping status: Never Used   Substance and Sexual Activity    Alcohol use: Not Currently    Drug use: Never    Sexual activity: Not Currently     Partners: Male     Allergies   Allergen Reactions    Statins Myalgia     Current Outpatient Medications   Medication Sig Dispense Refill    albuterol sulfate  (90 Base) MCG/ACT inhaler Inhale 1 puff Every 6 (Six) Hours As Needed for Wheezing or Shortness of Air. 6.7 g 0    benzonatate (TESSALON) 200 MG capsule Take 1 capsule by mouth 3 (Three) Times a Day As Needed for Cough. 21 capsule 0    Breo Ellipta 200-25 MCG/INH inhaler Inhale 1 puff Daily. 1 each 2    metoprolol succinate XL (TOPROL-XL) 25 MG 24 hr tablet Take 1 tablet by mouth Daily. 30 tablet 5    niacin (Niaspan) 500 MG CR tablet Take 1 tablet by mouth Every Night. 90 tablet 1    methylPREDNISolone (MEDROL) 4 MG dose pack Take by mouth as directed on package. (Patient not taking: Reported on 3/1/2024) 21 tablet 0     No current facility-administered medications for this visit.     Review of Systems   Constitutional: Negative.    HENT: Negative.     Eyes: Negative.    Respiratory: Negative.     Cardiovascular: Negative.    Gastrointestinal: Negative.    Endocrine: Negative.    Genitourinary: Negative.    Musculoskeletal: Negative.         Left foot and ankle pain   Skin: Negative.    Allergic/Immunologic: Negative.    Neurological: Negative.    Hematological: Negative.    Psychiatric/Behavioral:  Negative.     All other systems reviewed and are negative.      OBJECTIVE     Vitals:    03/22/24 0749   Pulse: 74   Temp: 97.8 °F (36.6 °C)   SpO2: 94%       WBC   Date Value Ref Range Status   12/12/2022 5.85 3.40 - 10.80 10*3/mm3 Final     RBC   Date Value Ref Range Status   12/12/2022 4.78 3.77 - 5.28 10*6/mm3 Final     Hemoglobin   Date Value Ref Range Status   12/12/2022 13.9 12.0 - 15.9 g/dL Final     Hematocrit   Date Value Ref Range Status   12/12/2022 42.7 34.0 - 46.6 % Final     MCV   Date Value Ref Range Status   12/12/2022 89.3 79.0 - 97.0 fL Final     MCH   Date Value Ref Range Status   12/12/2022 29.1 26.6 - 33.0 pg Final     MCHC   Date Value Ref Range Status   12/12/2022 32.6 31.5 - 35.7 g/dL Final     RDW   Date Value Ref Range Status   12/12/2022 13.2 12.3 - 15.4 % Final     RDW-SD   Date Value Ref Range Status   12/12/2022 43.6 37.0 - 54.0 fl Final     MPV   Date Value Ref Range Status   12/12/2022 10.0 6.0 - 12.0 fL Final     Platelets   Date Value Ref Range Status   12/12/2022 295 140 - 450 10*3/mm3 Final     Neutrophils Absolute   Date Value Ref Range Status   07/07/2021 2.61 1.70 - 7.00 10*3/mm3 Final   07/07/2021 3.00 1.70 - 7.00 10*3/mm3 Final     Eosinophils Absolute   Date Value Ref Range Status   07/07/2021 0.09 0.00 - 0.40 10*3/mm3 Final   07/07/2021 0.23 0.00 - 0.40 10*3/mm3 Final     Basophils Absolute   Date Value Ref Range Status   07/07/2021 0.14 0.00 - 0.20 10*3/mm3 Final         Lab Results   Component Value Date    GLUCOSE 99 12/12/2022    BUN 13 12/12/2022    CREATININE 0.66 12/12/2022    EGFRIFNONA 86 07/07/2021    BCR 19.7 12/12/2022    K 4.3 12/12/2022    CO2 26.3 12/12/2022    CALCIUM 9.2 12/12/2022    ALBUMIN 4.40 12/12/2022    AST 17 12/12/2022    ALT 18 12/12/2022       Patient seen in no apparent distress.      PHYSICAL EXAM:     Foot/Ankle Exam    GENERAL  Appearance:  appears stated age  Orientation:  AAOx3  Affect:  appropriate  Gait:  unimpaired  Assistance:   independent  Right shoe gear: casual shoe  Left shoe gear: casual shoe    VASCULAR     Right Foot Vascularity   Normal vascular exam    Dorsalis pedis:  2+  Posterior tibial:  2+  Skin temperature:  warm  Edema grading:  None  CFT:  < 3 seconds  Pedal hair growth:  Present  Varicosities:  none     Left Foot Vascularity   Normal vascular exam    Dorsalis pedis:  2+  Posterior tibial:  2+  Skin temperature:  warm  Edema grading:  None  CFT:  < 3 seconds  Pedal hair growth:  Present  Varicosities:  none     NEUROLOGIC     Right Foot Neurologic   Normal sensation    Light touch sensation: normal  Vibratory sensation: normal  Hot/Cold sensation: normal  Protective Sensation using [place] Monofilament:   Sites intact: 10  Sites tested: 10     Left Foot Neurologic   Normal sensation    Light touch sensation: normal  Vibratory sensation: normal  Hot/Cold sensation:  normal  Protective Sensation using [place] Monofilament:   Sites intact: 10  Sites tested: 10    MUSCULOSKELETAL     Left Foot Musculoskeletal   Tenderness:  peroneal tendon tenderness (Pain to peroneus longus)    MUSCLE STRENGTH     Right Foot Muscle Strength   Foot dorsiflexion:  4  Foot plantar flexion:  4  Foot inversion:  4  Foot eversion:  4     Left Foot Muscle Strength   Foot dorsiflexion:  4  Foot plantar flexion:  4  Foot inversion:  4  Foot eversion:  4    RANGE OF MOTION     Right Foot Range of Motion   Foot and ankle ROM within normal limits       Left Foot Range of Motion   Foot and ankle ROM within normal limits      DERMATOLOGIC      Right Foot Dermatologic   Skin  Right foot skin is intact.      Left Foot Dermatologic   Skin  Left foot skin is intact.       RADIOLOGY:        MRI Foot Left Without Contrast    Result Date: 3/19/2024  Narrative:  MRI FOOT LEFT WO CONTRAST-  Date of Exam: 3/19/2024 6:51 AM  Indication: Foot pain, chronic, no prior imaging; M76.72-Peroneal tendinitis, left leg; S93.492A-Sprain of other ligament of  left ankle, initial encounter.  Comparison: 3 view left foot 1-24 10:21 a.m.  Technique:  Routine multiplanar/multisequence sequence images of the left foot were obtained without contrast administration.   Findings: There is moderate to severe T2 high signal consistent with edema throughout the fifth metatarsal shaft. Milder edema is noted throughout the majority of the fourth metatarsal shaft. No fracture is seen.  Mild osteoarthritic changes are noted throughout the midfoot. There is a bipartite medial hallux sesamoid. Visualized muscular and tendinous structures appear unremarkable.      Impression: Impression: 1.  Edema in the fourth and fifth metatarsal shafts, not completely specific in appearance. Findings may represent stress reactions or posttraumatic contusions. Other possibilities such as infection are considered less likely. 2.  Mild osteoarthritic changes throughout the midfoot    Electronically Signed By-Jose Otero MD On:3/19/2024 5:19 PM      MRI Ankle Left Without Contrast    Result Date: 3/19/2024  Narrative: MRI ANKLE LEFT  WO CONTRAST-  Date of Exam: 3/19/2024 7:09 AM  Indication: Ankle pain, tendon abnormality suspected, neg xray; M76.72-Peroneal tendinitis, left leg; S93.492A-Sprain of other ligament of left ankle, initial encounter.  Comparison: 3 view foot radiograph 1/2/2024 10:21 a.m.  Technique:  Routine multiplanar/multisequence images of the left ankle were obtained without contrast administration.   Findings: There is moderate T2 high signal noted in the proximal half of the fifth metatarsal shaft. No fracture or malalignment is seen.  The peroneal tendons appear grossly unremarkable. Other visualized tendons around the ankle appear intact as well. Ankle ligaments appear intact.  Cartilage in the tibiotalar and subtalar joints is intact. Small tibiotalar and subtalar joint effusions are noted. No loose body is seen. T2 high signal consistent with edema is noted in the sinus tarsi.  Mild osteoarthritic changes are noted in the midfoot.  Plantar calcaneal spur is noted. There is thickening of the plantar fascia consistent with a history of plantar fasciitis.      Impression: Impression: 1.  Moderate edema in the proximal half of the fifth metatarsal shaft. Appearance is nonspecific. Finding may represent a stress reaction or contusion in the setting of previous trauma. Infection is considered less likely, ever, correlate clinically. 2.  Plantar calcaneal spur with evidence of previous plantar fasciitis 3.  Mild osteoarthritic changes in the midfoot    Electronically Signed By-Jose Otero MD On:3/19/2024 4:28 PM       ASSESSMENT/PLAN     Diagnoses and all orders for this visit:    1. Stress fracture of metatarsal bone of left foot with routine healing, subsequent encounter (Primary)    2. Foot sprain, left, sequela        Patient to begin stretching exercises and icing in the evening as tolerated. Discussed compression therapy and resting the extremity.  Anti-inflammatory medication to begin taking if okay by PCP.    Patient to offload left foot to help aid in healing.  Discussed step up to help even out using the boot.    Comprehensive lower extremity examination and evaluation was performed.    Discussed findings and treatment plan including risks, benefits, and treatment options with patient in detail. Patient agreed with treatment plan.    Medications and allergies reviewed.  Reviewed available lab values along with other pertinent labs.  These were discussed with the patient.    An After Visit Summary was printed and given to the patient at discharge, including (if requested) any available informative/educational handouts regarding diagnosis, treatment, or medications. All questions were answered to patient/family satisfaction. Should symptoms fail to improve or worsen they agree to call or return to clinic or to go to the Emergency Department. Discussed the importance of following up with  any needed screening tests/labs/specialist appointments and any requested follow-up recommended by me today. Importance of maintaining follow-up discussed and patient accepts that missed appointments can delay diagnosis and potentially lead to worsening of conditions.    Return in about 1 month (around 4/22/2024)., or sooner if acute issues arise.    This document has been electronically signed by Leighton Fletcher DPM on March 22, 2024 10:00 EDT

## 2024-04-01 ENCOUNTER — TELEPHONE (OUTPATIENT)
Dept: PODIATRY | Facility: CLINIC | Age: 64
End: 2024-04-01

## 2024-04-01 NOTE — TELEPHONE ENCOUNTER
Provider:  DR. CLARA WOOD    Caller: MICHAELA VELAZCO    Relationship to Patient: SELF    Pharmacy:  [facility] [phone]    Phone Number: [phone]    Reason for Call:  PATIENT TAKING MOTRIN FOR LEFT ANKLE. NOT HELPING. HAVING BURNING PAIN ALSO. ASKING FOR PAIN RX. PLEASE CALL PATIENT WITH RESPONSE.    When was the patient last seen: 3/22/24

## 2024-04-02 RX ORDER — MELOXICAM 15 MG/1
15 TABLET ORAL DAILY
Qty: 30 TABLET | Refills: 0 | Status: SHIPPED | OUTPATIENT
Start: 2024-04-02

## 2024-04-18 ENCOUNTER — OFFICE VISIT (OUTPATIENT)
Dept: PODIATRY | Facility: CLINIC | Age: 64
End: 2024-04-18
Payer: COMMERCIAL

## 2024-04-18 VITALS
OXYGEN SATURATION: 97 % | RESPIRATION RATE: 18 BRPM | HEART RATE: 74 BPM | HEIGHT: 65 IN | WEIGHT: 254 LBS | BODY MASS INDEX: 42.32 KG/M2 | SYSTOLIC BLOOD PRESSURE: 158 MMHG | DIASTOLIC BLOOD PRESSURE: 94 MMHG

## 2024-04-18 DIAGNOSIS — M84.375D STRESS FRACTURE OF METATARSAL BONE OF LEFT FOOT WITH ROUTINE HEALING, SUBSEQUENT ENCOUNTER: Primary | ICD-10-CM

## 2024-04-18 NOTE — PROGRESS NOTES
[facility] - PODIATRY    Today's Date: 24    Patient Name: Radha Herr  MRN: [id number]  CSN: [id number]  PCP: Swetha Krishna MD,   Referring Provider: No ref. provider found    SUBJECTIVE     Chief Complaint   Patient presents with    Left Foot - Follow-up     Stress fracture     HPI: Radha Herr, a 63 y.o.female, presents to clinic.    Patient is a 63-year-old female presenting with left foot and ankle pain.  Patient states she rolled her ankle in November and it has not gotten better.  Patient states it improved initially but she has soreness on the outside bottom of her foot that runs across her foot.  Patient states that she has not tried any real treatment for this.  She had x-rays that showed no fractures.    24- Some burning to outside of foot. The pain is not constant only when turning specific ways.     Past Medical History:   Diagnosis Date    Allergic Began in my 30s progressively have gotten worse    Dust, pollen, mold, grass clippings, trees    Allergic rhinitis     Asymptomatic menopausal state     Clotting disorder nose bleeds    Not enough skin covering my membranes    Cough     COVID-19     Difficulty walking     only because of injury    Diverticulosis     Essential (primary) hypertension     Headache sinus/allergies    History of medical problems 23 & 23    had varithena vein surgery    Mild intermittent asthma     allergy induced, uses an inhaler 1x day    Other dorsalgia     Overflow incontinence     Peroneus longus tendinitis     Sprain of anterior talofibular ligament of left ankle      Past Surgical History:   Procedure Laterality Date    BILATERAL BREAST REDUCTION  2017    BREAST SURGERY  2017     SECTION      COLONOSCOPY      HYSTERECTOMY      LUMBAR DISCECTOMY FUSION INSTRUMENTATION      NASAL SEPTUM SURGERY      SINUS SURGERY      Deviated septum    VARICOSE VEIN SURGERY       Family History   Problem Relation Age of Onset     Cancer Mother             COPD Mother             Hypertension Mother             Heart attack Father     Coronary artery disease Father     Hyperlipidemia Father             Hypertension Father             COPD Father             Stroke Father     Depression Father     Mental illness Father     Stroke Maternal Grandfather             Diabetes Maternal Grandmother              Social History     Socioeconomic History    Marital status:     Number of children: 2   Tobacco Use    Smoking status: Never    Smokeless tobacco: Never   Vaping Use    Vaping status: Never Used   Substance and Sexual Activity    Alcohol use: Not Currently    Drug use: Never    Sexual activity: Not Currently     Partners: Male     Allergies   Allergen Reactions    Statins Myalgia     Current Outpatient Medications   Medication Sig Dispense Refill    albuterol sulfate  (90 Base) MCG/ACT inhaler Inhale 1 puff Every 6 (Six) Hours As Needed for Wheezing or Shortness of Air. 6.7 g 0    Breo Ellipta 200-25 MCG/INH inhaler Inhale 1 puff Daily. 1 each 2    metoprolol succinate XL (TOPROL-XL) 25 MG 24 hr tablet Take 1 tablet by mouth Daily. 30 tablet 5    niacin (Niaspan) 500 MG CR tablet Take 1 tablet by mouth Every Night. 90 tablet 1    benzonatate (TESSALON) 200 MG capsule Take 1 capsule by mouth 3 (Three) Times a Day As Needed for Cough. 21 capsule 0    diclofenac (VOLTAREN) 50 MG EC tablet Take 1 tablet by mouth 2 (Two) Times a Day. 30 tablet 1    methylPREDNISolone (MEDROL) 4 MG dose pack Take by mouth as directed on package. (Patient not taking: Reported on 3/1/2024) 21 tablet 0     No current facility-administered medications for this visit.     Review of Systems   Constitutional: Negative.    HENT: Negative.     Eyes: Negative.    Respiratory: Negative.     Cardiovascular: Negative.    Gastrointestinal: Negative.    Endocrine: Negative.    Genitourinary: Negative.     Musculoskeletal: Negative.         Left foot and ankle pain   Skin: Negative.    Allergic/Immunologic: Negative.    Neurological: Negative.    Hematological: Negative.    Psychiatric/Behavioral: Negative.     All other systems reviewed and are negative.      OBJECTIVE     Vitals:    04/18/24 1034   BP: 158/94   Pulse: 74   Resp: 18   SpO2: 97%       WBC   Date Value Ref Range Status   12/12/2022 5.85 3.40 - 10.80 10*3/mm3 Final     RBC   Date Value Ref Range Status   12/12/2022 4.78 3.77 - 5.28 10*6/mm3 Final     Hemoglobin   Date Value Ref Range Status   12/12/2022 13.9 12.0 - 15.9 g/dL Final     Hematocrit   Date Value Ref Range Status   12/12/2022 42.7 34.0 - 46.6 % Final     MCV   Date Value Ref Range Status   12/12/2022 89.3 79.0 - 97.0 fL Final     MCH   Date Value Ref Range Status   12/12/2022 29.1 26.6 - 33.0 pg Final     MCHC   Date Value Ref Range Status   12/12/2022 32.6 31.5 - 35.7 g/dL Final     RDW   Date Value Ref Range Status   12/12/2022 13.2 12.3 - 15.4 % Final     RDW-SD   Date Value Ref Range Status   12/12/2022 43.6 37.0 - 54.0 fl Final     MPV   Date Value Ref Range Status   12/12/2022 10.0 6.0 - 12.0 fL Final     Platelets   Date Value Ref Range Status   12/12/2022 295 140 - 450 10*3/mm3 Final     Neutrophils Absolute   Date Value Ref Range Status   07/07/2021 2.61 1.70 - 7.00 10*3/mm3 Final   07/07/2021 3.00 1.70 - 7.00 10*3/mm3 Final     Eosinophils Absolute   Date Value Ref Range Status   07/07/2021 0.09 0.00 - 0.40 10*3/mm3 Final   07/07/2021 0.23 0.00 - 0.40 10*3/mm3 Final     Basophils Absolute   Date Value Ref Range Status   07/07/2021 0.14 0.00 - 0.20 10*3/mm3 Final         Lab Results   Component Value Date    GLUCOSE 99 12/12/2022    BUN 13 12/12/2022    CREATININE 0.66 12/12/2022    EGFRIFNONA 86 07/07/2021    BCR 19.7 12/12/2022    K 4.3 12/12/2022    CO2 26.3 12/12/2022    CALCIUM 9.2 12/12/2022    ALBUMIN 4.40 12/12/2022    AST 17 12/12/2022    ALT 18 12/12/2022       Patient  seen in no apparent distress.      PHYSICAL EXAM:     Foot/Ankle Exam    GENERAL  Appearance:  appears stated age  Orientation:  AAOx3  Affect:  appropriate  Gait:  unimpaired  Assistance:  independent  Right shoe gear: casual shoe  Left shoe gear: casual shoe    VASCULAR     Right Foot Vascularity   Normal vascular exam    Dorsalis pedis:  2+  Posterior tibial:  2+  Skin temperature:  warm  Edema grading:  None  CFT:  < 3 seconds  Pedal hair growth:  Present  Varicosities:  none     Left Foot Vascularity   Normal vascular exam    Dorsalis pedis:  2+  Posterior tibial:  2+  Skin temperature:  warm  Edema grading:  None  CFT:  < 3 seconds  Pedal hair growth:  Present  Varicosities:  none     NEUROLOGIC     Right Foot Neurologic   Normal sensation    Light touch sensation: normal  Vibratory sensation: normal  Hot/Cold sensation: normal  Protective Sensation using [place] Monofilament:   Sites intact: 10  Sites tested: 10     Left Foot Neurologic   Normal sensation    Light touch sensation: normal  Vibratory sensation: normal  Hot/Cold sensation:  normal  Protective Sensation using [place] Monofilament:   Sites intact: 10  Sites tested: 10    MUSCLE STRENGTH     Right Foot Muscle Strength   Foot dorsiflexion:  4  Foot plantar flexion:  4  Foot inversion:  4  Foot eversion:  4     Left Foot Muscle Strength   Foot dorsiflexion:  4  Foot plantar flexion:  4  Foot inversion:  4  Foot eversion:  4    RANGE OF MOTION     Right Foot Range of Motion   Foot and ankle ROM within normal limits       Left Foot Range of Motion   Foot and ankle ROM within normal limits      DERMATOLOGIC      Right Foot Dermatologic   Skin  Right foot skin is intact.      Left Foot Dermatologic   Skin  Left foot skin is intact.      Left foot additional comments: To 5th metatarsal       RADIOLOGY:        No results found.    ASSESSMENT/PLAN     Diagnoses and all orders for this visit:    1. Stress fracture of metatarsal bone of left  foot with routine healing, subsequent encounter (Primary)  -     Ambulatory Referral to Physical Therapy Evaluate and treat    Other orders  -     diclofenac (VOLTAREN) 50 MG EC tablet; Take 1 tablet by mouth 2 (Two) Times a Day.  Dispense: 30 tablet; Refill: 1        Patient to continue  stretching exercises and icing in the evening as tolerated. Discussed compression therapy and resting the extremity.  Anti-inflammatory medication to begin taking if okay by PCP.    Transition out of boot     PT prescribed     RTC in 1 month     Disclofenac prescribed     Comprehensive lower extremity examination and evaluation was performed.    Discussed findings and treatment plan including risks, benefits, and treatment options with patient in detail. Patient agreed with treatment plan.    Medications and allergies reviewed.  Reviewed available lab values along with other pertinent labs.  These were discussed with the patient.    An After Visit Summary was printed and given to the patient at discharge, including (if requested) any available informative/educational handouts regarding diagnosis, treatment, or medications. All questions were answered to patient/family satisfaction. Should symptoms fail to improve or worsen they agree to call or return to clinic or to go to the Emergency Department. Discussed the importance of following up with any needed screening tests/labs/specialist appointments and any requested follow-up recommended by me today. Importance of maintaining follow-up discussed and patient accepts that missed appointments can delay diagnosis and potentially lead to worsening of conditions.    No follow-ups on file., or sooner if acute issues arise.    This document has been electronically signed by Leighton Fletcher DPM on April 18, 2024 12:57 EDT

## 2024-05-06 ENCOUNTER — HOSPITAL ENCOUNTER (OUTPATIENT)
Dept: GENERAL RADIOLOGY | Facility: HOSPITAL | Age: 64
Discharge: HOME | End: 2024-05-06
Admitting: PODIATRIST
Payer: COMMERCIAL

## 2024-05-06 PROCEDURE — 73630 X-RAY EXAM OF FOOT: CPT

## 2024-05-16 ENCOUNTER — OFFICE VISIT (OUTPATIENT)
Dept: PODIATRY | Facility: CLINIC | Age: 64
End: 2024-05-16
Payer: COMMERCIAL

## 2024-05-16 VITALS
DIASTOLIC BLOOD PRESSURE: 75 MMHG | BODY MASS INDEX: 42.32 KG/M2 | WEIGHT: 254 LBS | HEART RATE: 71 BPM | HEIGHT: 65 IN | OXYGEN SATURATION: 96 % | SYSTOLIC BLOOD PRESSURE: 147 MMHG

## 2024-05-16 DIAGNOSIS — M24.573 EQUINUS CONTRACTURE OF ANKLE: ICD-10-CM

## 2024-05-16 DIAGNOSIS — M84.375D STRESS FRACTURE OF METATARSAL BONE OF LEFT FOOT WITH ROUTINE HEALING, SUBSEQUENT ENCOUNTER: Primary | ICD-10-CM

## 2024-05-16 DIAGNOSIS — M76.72 PERONEUS LONGUS TENDINITIS, LEFT: ICD-10-CM

## 2024-05-16 NOTE — PROGRESS NOTES
[facility] - PODIATRY    Today's Date: 24    Patient Name: Radha Herr  MRN: [id number]  CSN: [id number]  PCP: Swetha Krishna MD,   Referring Provider: No ref. provider found    SUBJECTIVE     Chief Complaint   Patient presents with    Left Foot - Follow-up     Stress fracture     HPI: Radha Herr, a 63 y.o.female, presents to clinic.    Patient is a 63-year-old female presenting with left foot and ankle pain.  Patient states she rolled her ankle in November and it has not gotten better.  Patient states it improved initially but she has soreness on the outside bottom of her foot that runs across her foot.  Patient states that she has not tried any real treatment for this.  She had x-rays that showed no fractures.    2024-patient says she is feeling better and did not go to physical therapy due to the improvement.  She had a over the course last couple weeks it started hurting on her foot again.  She would like to go back to physical therapy to see if it improves.    Past Medical History:   Diagnosis Date    Allergic Began in my 30s progressively have gotten worse    Dust, pollen, mold, grass clippings, trees    Allergic rhinitis     Asymptomatic menopausal state     Clotting disorder nose bleeds    Not enough skin covering my membranes    Cough     COVID-19     Difficulty walking     only because of injury    Diverticulosis     Essential (primary) hypertension     Headache sinus/allergies    History of medical problems 23 & 23    had varithena vein surgery    Mild intermittent asthma     allergy induced, uses an inhaler 1x day    Other dorsalgia     Overflow incontinence     Peroneus longus tendinitis     Sprain of anterior talofibular ligament of left ankle      Past Surgical History:   Procedure Laterality Date    BILATERAL BREAST REDUCTION  2017    BREAST SURGERY  2017     SECTION      COLONOSCOPY      HYSTERECTOMY      LUMBAR DISCECTOMY FUSION  INSTRUMENTATION      NASAL SEPTUM SURGERY      SINUS SURGERY      Deviated septum    VARICOSE VEIN SURGERY       Family History   Problem Relation Age of Onset    Cancer Mother             COPD Mother             Hypertension Mother             Heart attack Father     Coronary artery disease Father     Hyperlipidemia Father             Hypertension Father             COPD Father             Stroke Father     Depression Father     Mental illness Father     Stroke Maternal Grandfather             Diabetes Maternal Grandmother              Social History     Socioeconomic History    Marital status:     Number of children: 2   Tobacco Use    Smoking status: Never    Smokeless tobacco: Never   Vaping Use    Vaping status: Never Used   Substance and Sexual Activity    Alcohol use: Not Currently    Drug use: Never    Sexual activity: Not Currently     Partners: Male     Birth control/protection: Hysterectomy     Allergies   Allergen Reactions    Statins Myalgia     Current Outpatient Medications   Medication Sig Dispense Refill    albuterol sulfate  (90 Base) MCG/ACT inhaler Inhale 1 puff Every 6 (Six) Hours As Needed for Wheezing or Shortness of Air. 6.7 g 0    Breo Ellipta 200-25 MCG/INH inhaler Inhale 1 puff Daily. 1 each 2    diclofenac (VOLTAREN) 50 MG EC tablet Take 1 tablet by mouth 2 (Two) Times a Day for 30 days. 30 tablet 1    metoprolol succinate XL (TOPROL-XL) 25 MG 24 hr tablet Take 1 tablet by mouth Daily. 30 tablet 5    niacin (Niaspan) 500 MG CR tablet Take 1 tablet by mouth Every Night. 90 tablet 1     No current facility-administered medications for this visit.     Review of Systems   Constitutional: Negative.    HENT: Negative.     Eyes: Negative.    Respiratory: Negative.     Cardiovascular: Negative.    Gastrointestinal: Negative.    Endocrine: Negative.    Genitourinary: Negative.    Musculoskeletal: Negative.         Left  foot and ankle pain   Skin: Negative.    Allergic/Immunologic: Negative.    Neurological: Negative.    Hematological: Negative.    Psychiatric/Behavioral: Negative.     All other systems reviewed and are negative.      OBJECTIVE     Vitals:    05/16/24 0931   BP: 147/75   Pulse: 71   SpO2: 96%       WBC   Date Value Ref Range Status   12/12/2022 5.85 3.40 - 10.80 10*3/mm3 Final     RBC   Date Value Ref Range Status   12/12/2022 4.78 3.77 - 5.28 10*6/mm3 Final     Hemoglobin   Date Value Ref Range Status   12/12/2022 13.9 12.0 - 15.9 g/dL Final     Hematocrit   Date Value Ref Range Status   12/12/2022 42.7 34.0 - 46.6 % Final     MCV   Date Value Ref Range Status   12/12/2022 89.3 79.0 - 97.0 fL Final     MCH   Date Value Ref Range Status   12/12/2022 29.1 26.6 - 33.0 pg Final     MCHC   Date Value Ref Range Status   12/12/2022 32.6 31.5 - 35.7 g/dL Final     RDW   Date Value Ref Range Status   12/12/2022 13.2 12.3 - 15.4 % Final     RDW-SD   Date Value Ref Range Status   12/12/2022 43.6 37.0 - 54.0 fl Final     MPV   Date Value Ref Range Status   12/12/2022 10.0 6.0 - 12.0 fL Final     Platelets   Date Value Ref Range Status   12/12/2022 295 140 - 450 10*3/mm3 Final     Neutrophils Absolute   Date Value Ref Range Status   07/07/2021 2.61 1.70 - 7.00 10*3/mm3 Final   07/07/2021 3.00 1.70 - 7.00 10*3/mm3 Final     Eosinophils Absolute   Date Value Ref Range Status   07/07/2021 0.09 0.00 - 0.40 10*3/mm3 Final   07/07/2021 0.23 0.00 - 0.40 10*3/mm3 Final     Basophils Absolute   Date Value Ref Range Status   07/07/2021 0.14 0.00 - 0.20 10*3/mm3 Final         Lab Results   Component Value Date    GLUCOSE 99 12/12/2022    BUN 13 12/12/2022    CREATININE 0.66 12/12/2022    EGFRIFNONA 86 07/07/2021    BCR 19.7 12/12/2022    K 4.3 12/12/2022    CO2 26.3 12/12/2022    CALCIUM 9.2 12/12/2022    ALBUMIN 4.40 12/12/2022    AST 17 12/12/2022    ALT 18 12/12/2022       Patient seen in no apparent distress.      PHYSICAL EXAM:      Foot/Ankle Exam    GENERAL  Appearance:  appears stated age  Orientation:  AAOx3  Affect:  appropriate  Gait:  unimpaired  Assistance:  independent  Right shoe gear: casual shoe  Left shoe gear: casual shoe    VASCULAR     Right Foot Vascularity   Normal vascular exam    Dorsalis pedis:  2+  Posterior tibial:  2+  Skin temperature:  warm  Edema grading:  None  CFT:  < 3 seconds  Pedal hair growth:  Present  Varicosities:  none     Left Foot Vascularity   Normal vascular exam    Dorsalis pedis:  2+  Posterior tibial:  2+  Skin temperature:  warm  Edema grading:  None  CFT:  < 3 seconds  Pedal hair growth:  Present  Varicosities:  none     NEUROLOGIC     Right Foot Neurologic   Normal sensation    Light touch sensation: normal  Vibratory sensation: normal  Hot/Cold sensation: normal  Protective Sensation using [place] Monofilament:   Sites intact: 10  Sites tested: 10     Left Foot Neurologic   Normal sensation    Light touch sensation: normal  Vibratory sensation: normal  Hot/Cold sensation:  normal  Protective Sensation using [place] Monofilament:   Sites intact: 10  Sites tested: 10    MUSCLE STRENGTH     Right Foot Muscle Strength   Foot dorsiflexion:  4  Foot plantar flexion:  4  Foot inversion:  4  Foot eversion:  4     Left Foot Muscle Strength   Foot dorsiflexion:  4  Foot plantar flexion:  4  Foot inversion:  4  Foot eversion:  4    RANGE OF MOTION     Right Foot Range of Motion   Foot and ankle ROM within normal limits       Left Foot Range of Motion   Foot and ankle ROM within normal limits      DERMATOLOGIC      Right Foot Dermatologic   Skin  Right foot skin is intact.      Left Foot Dermatologic   Skin  Left foot skin is intact.       RADIOLOGY:        XR Foot 3+ View Left    Result Date: 5/6/2024  Narrative: XR FOOT 3+ VW LEFT-  Date of Exam: 5/6/2024 8:20 AM  Indication: fracture follow up; M84.375D-Stress fracture, left foot, subsequent encounter for fracture with routine healing   Comparison: 1/2/2024  Findings: The bony structures remain intact and in normal alignment. The joint spaces and articular surfaces are preserved. No fractures or periostitis is identified.      Impression: Impression: Negative left foot. No fractures are identified.   Electronically Signed By-Norm Rivero MD On:5/6/2024 9:27 AM       ASSESSMENT/PLAN     Diagnoses and all orders for this visit:    1. Stress fracture of metatarsal bone of left foot with routine healing, subsequent encounter (Primary)    2. Peroneus longus tendinitis, left    3. Equinus contracture of ankle    Other orders  -     diclofenac (VOLTAREN) 50 MG EC tablet; Take 1 tablet by mouth 2 (Two) Times a Day for 30 days.  Dispense: 30 tablet; Refill: 1          Patient to continue  stretching exercises and icing in the evening as tolerated. Discussed compression therapy and resting the extremity.  Anti-inflammatory medication to begin taking if okay by PCP.    PT prescribed     RTC in 1 month     Disclofenac prescribed     Comprehensive lower extremity examination and evaluation was performed.    Discussed findings and treatment plan including risks, benefits, and treatment options with patient in detail. Patient agreed with treatment plan.    Medications and allergies reviewed.  Reviewed available lab values along with other pertinent labs.  These were discussed with the patient.    An After Visit Summary was printed and given to the patient at discharge, including (if requested) any available informative/educational handouts regarding diagnosis, treatment, or medications. All questions were answered to patient/family satisfaction. Should symptoms fail to improve or worsen they agree to call or return to clinic or to go to the Emergency Department. Discussed the importance of following up with any needed screening tests/labs/specialist appointments and any requested follow-up recommended by me today. Importance of maintaining follow-up discussed and  patient accepts that missed appointments can delay diagnosis and potentially lead to worsening of conditions.    Return in about 2 months (around 7/16/2024)., or sooner if acute issues arise.    This document has been electronically signed by Leighton Fletcher DPM on May 16, 2024 12:39 EDT

## 2024-05-23 ENCOUNTER — TREATMENT (OUTPATIENT)
Dept: PHYSICAL THERAPY | Facility: CLINIC | Age: 64
End: 2024-05-23
Payer: COMMERCIAL

## 2024-05-23 DIAGNOSIS — R26.9 GAIT DISTURBANCE: ICD-10-CM

## 2024-05-23 DIAGNOSIS — M79.672 CHRONIC FOOT PAIN, LEFT: ICD-10-CM

## 2024-05-23 DIAGNOSIS — G89.29 CHRONIC FOOT PAIN, LEFT: ICD-10-CM

## 2024-05-23 DIAGNOSIS — R26.89 BALANCE DISORDER: ICD-10-CM

## 2024-05-23 DIAGNOSIS — R29.898 WEAKNESS OF LEFT FOOT: ICD-10-CM

## 2024-05-23 DIAGNOSIS — M84.375D METATARSAL STRESS FRACTURE OF LEFT FOOT WITH ROUTINE HEALING: Primary | ICD-10-CM

## 2024-05-23 PROCEDURE — 97161 PT EVAL LOW COMPLEX 20 MIN: CPT | Performed by: PHYSICAL THERAPIST

## 2024-05-23 PROCEDURE — 97110 THERAPEUTIC EXERCISES: CPT | Performed by: PHYSICAL THERAPIST

## 2024-05-23 NOTE — PROGRESS NOTES
Physical Therapy Initial Evaluation and Plan of Care      Patient: Radha Herr   : 1960  Diagnosis/ICD-10 Code:  Metatarsal stress fracture of left foot with routine healing [M84.375D]  Referring practitioner: Leighton Fletcher DPM  Date of Initial Visit: 2024  Today's Date: 2024  Patient seen for 1 sessions         Visit Diagnoses:    ICD-10-CM ICD-9-CM   1. Metatarsal stress fracture of left foot with routine healing  M84.375D V54.89   2. Chronic foot pain, left  M79.672 729.5    G89.29 338.29   3. Weakness of left foot  R29.898 734   4. Balance disorder  R26.89 781.99   5. Gait disturbance  R26.9 781.2       Subjective Evaluation    History of Present Illness  Mechanism of injury: On 2023, Anaya was walking on the road and her foot went off the edge of the road.  She tried treating it at home, x-rays were negative, tried further medication, did another xray (3 view) and saw the stress fracture this time.      She has been limited on what she can do - was in a brace, boot, etc. and is now she has gained 25 lbs since this accident.    However, she is much better than where she was months ago.  She is now out of the boot and has ordered a shoe insert.  She is now having burning on the lateral side of the foot under the L ankle.  It was radiating down to the the lateral side of the foot and toes, but now it is in that same spot, occasional burning across dorum of foot.    She is staying active, wearing support sandals or tennis shoes. She is doing stretches the doctor gave her.      She has increased pain with going downstairs or if she randomly turns her foot a certain direction.    No AD usage    Pain  Current pain ratin  Quality: burning  Aggravating factors: stairs, repetitive movement, ambulation, movement, prolonged positioning and standing    Social Support  Lives in: multiple-level home  Lives with: spouse    Treatments  Previous treatment: physical therapy  Patient  Goals  Patient goals for therapy: decreased edema, decreased pain, improved balance, increased motion, increased strength, independence with ADLs/IADLs and return to sport/leisure activities             Objective          Active Range of Motion   Left Ankle/Foot   Dorsiflexion (ke): 10 degrees   Plantar flexion: 35 degrees   Inversion: 30 degrees   Eversion: 10 degrees     Strength/Myotome Testing     Left Ankle/Foot   Dorsiflexion: 4-  Plantar flexion: 4-  Inversion: 4  Eversion: 4-    Additional Strength Details  Burning on the lateral side of the foot, right under the L malleoli    Tenderness on the lateral 5th metatarsal    Gait: no AD usage, decreased WB on the L ankle          Assessment & Plan       Assessment  Impairments: abnormal gait, abnormal or restricted ROM, activity intolerance, impaired balance, impaired physical strength, lacks appropriate home exercise program, pain with function, safety issue and weight-bearing intolerance   Assessment details: Pt presents with limitations, noted below, that impede her ability to go downstairs, walking long distances, ambulation on uneven ground. The skills of a therapist will be required to safely and effectively implement the following treatment plan to restore maximal level of function.        Goals  Plan Goals: ANKLE PROBLEMS    1. The patient has limited ROM for the left ankle.   LTG 1: 12 weeks:  In order to allow the patient greater ease with forward, lateral, and diagonal mobility the patient will demonstrate improved ROM of the left ankle as follows:  20 degrees of dorsiflexion, 50 degrees of plantarflexion, 30 degrees of inversion, and 15 degrees of eversion.  STATUS:  New   STG 1a: 4 weeks:  The patient will demonstrate improved ROM of the left ankle as follows:  30 degrees of dorsiflexion, 40 degrees of plantarflexion, and 12 degrees of eversion.    STATUS:  New   TREATMENT: Manual therapy, therapeutic exercise, home exercise instruction, and  modalities as needed to include:  moist heat, electrical stimulation, ultrasound, and ice.    2. The patient has limited strength of the left ankle.   LTG 2: 12 weeks:  In order to provide greater joint stability of the left ankle the patient will demonstrate improved strength of the left ankle as follows:  4+/5 dorsiflexion, 4+/5 inversion, 4+/5 eversion, and 4+/5 plantar flexion.    STATUS:  New   STG 2a: 4 weeks:  The patient will demonstrate improved strength of the left ankle as follows:  4/5 dorsiflexion, 4/5 inversion, 4/5 eversion, and 4/5 plantar flexion.    STATUS:  New   TREATMENT: Therapeutic exercise, home exercise instruction, aquatic therapy.    3. The patient has gait dysfunction.   LTG 3: 12 weeks:  The patient will ambulate without assistive device, independently, for community distances with minimal limp to the left lower extremity in order to improve mobility and allow patient to perform activities such as grocery shopping with greater ease.    STATUS:  New   STG 3a: 4 weeks: The patient will be independent in [facility].    STATUS:  New   TREATMENT: Gait training, therapeutic exercise, and home exercise instruction.    4. The patient complains of left ankle pain.  LTG 4: 12 weeks:  The patient will report a pain rating of 0/10 or better in order to improve tolerance to activities of daily living and improve sleep quality.  STATUS:  New  STG 4a: 4 weeks:  The patient will report a pain rating of 1/10 or better.  STATUS:  New  TREATMENT:  Therapeutic exercises, manual therapy, home exercise   instruction, and modalities as needed for pain to include:  electrical stimulation, moist heat, ice, ultrasound.      5. Mobility: Walking/Moving Around Functional Limitation     LTG 5: 12 weeks:  The patient will demonstrate limitation by achieving a score of 70/80 on the Lower Extremity Functional Scale.    STATUS:  New   STG 5a: 4 weeks:  The patient will demonstrate limitation by achieving a score of 65/80 on  the Lower Extremity Functional Scale.      STATUS:  New   TREATMENT:  Manual therapy, therapeutic exercise, home exercise instruction, and modalities as needed to include: moist heat, electrical stimulation, and ultrasound.      Plan  Therapy options: will be seen for skilled therapy services  Planned modality interventions: cryotherapy, dry needling, TENS, thermotherapy (hydrocollator packs) and ultrasound  Planned therapy interventions: balance/weight-bearing training, body mechanics training, fine motor coordination training, flexibility, functional ROM exercises, gait training, home exercise program, IADL retraining, joint mobilization, manual therapy, neuromuscular re-education, postural training, soft tissue mobilization, strengthening, stretching and therapeutic activities  Frequency: 2x week  Duration in weeks: 12  Treatment plan discussed with: patient  Plan details: Review HEP, update as needed.    Progress with L ankle and LE strength, trunk control/postural awareness, balance and gait training, coordination, increased stamina, decreased tightness, improved ROM/flexibility, education as needed.            History # of Personal Factors and/or Comorbidities: MODERATE (1-2)  Examination of Body System(s): # of elements: MODERATE (3)  Clinical Presentation: STABLE   Clinical Decision Making: LOW     Timed:  Manual Therapy:         mins  17895;  Therapeutic Exercise:    10     mins  66326;     Neuromuscular Corina:        mins  14940;    Therapeutic Activity:          mins  10173;     Gait Training:           mins  92102;     Ultrasound:          mins  91511;    Canalith Repos           ___  mins  05443      Untimed:  Electrical Stimulation:         mins  30943 ( );  Mechanical Traction:         mins  21785;   Dry Needling:                     mins self pay  Low Eval:                          22 mins  57802;  Medium Eval:                     mins  05903;   High Eval:                          mins  40890        Timed Treatment:   10   mins   Total Treatment:     32   mins    PT SIGNATURE: Nadege Weinstein PT, DPT  [place] License: [id number]  Electronically signed by Nadege Weinstein PT, 05/23/24, 6:58 AM EDT      Initial Certification    Certification Period: 5/23/2024 thru 8/20/2024  I certify that the therapy services are furnished while this patient is under my care.  The services outlined above are required by this patient, and will be reviewed every 90 days.     PHYSICIAN: Leighton Fletcher DPM  NPI: [id number]            PHYSICIAN PRINT NAME: ______________________________________________      PHYSICIAN SIGNATURE: ______________________________________________         DATE:________________________________        Please sign and return via fax to [phone].  Thank you, [facility].

## 2024-05-29 ENCOUNTER — TREATMENT (OUTPATIENT)
Dept: PHYSICAL THERAPY | Facility: CLINIC | Age: 64
End: 2024-05-29
Payer: COMMERCIAL

## 2024-05-29 DIAGNOSIS — R26.89 BALANCE DISORDER: ICD-10-CM

## 2024-05-29 DIAGNOSIS — M79.672 CHRONIC FOOT PAIN, LEFT: ICD-10-CM

## 2024-05-29 DIAGNOSIS — G89.29 CHRONIC FOOT PAIN, LEFT: ICD-10-CM

## 2024-05-29 DIAGNOSIS — R29.898 WEAKNESS OF LEFT FOOT: ICD-10-CM

## 2024-05-29 DIAGNOSIS — M84.375D METATARSAL STRESS FRACTURE OF LEFT FOOT WITH ROUTINE HEALING: Primary | ICD-10-CM

## 2024-05-29 DIAGNOSIS — R26.9 GAIT DISTURBANCE: ICD-10-CM

## 2024-05-29 PROCEDURE — 97530 THERAPEUTIC ACTIVITIES: CPT | Performed by: PHYSICAL THERAPIST

## 2024-05-29 PROCEDURE — 97110 THERAPEUTIC EXERCISES: CPT | Performed by: PHYSICAL THERAPIST

## 2024-05-29 PROCEDURE — 97112 NEUROMUSCULAR REEDUCATION: CPT | Performed by: PHYSICAL THERAPIST

## 2024-05-29 NOTE — PROGRESS NOTES
Physical Therapy Daily Treatment Note      Patient: Radha Herr   : 1960  Referring practitioner: No ref. provider found  Date of Initial Visit: Type: THERAPY  Noted: 2024  Today's Date: 2024  Patient seen for 2 sessions           Visit Diagnoses:    ICD-10-CM ICD-9-CM   1. Metatarsal stress fracture of left foot with routine healing  M84.375D V54.89   2. Chronic foot pain, left  M79.672 729.5    G89.29 338.29   3. Weakness of left foot  R29.898 734   4. Gait disturbance  R26.9 781.2   5. Balance disorder  R26.89 781.99       Subjective Evaluation    History of Present Illness    Subjective comment: 0/10 pain         Objective   See Exercise, Manual, and Modality Logs for complete treatment.       Assessment & Plan       Assessment  Impairments: abnormal gait, abnormal or restricted ROM, activity intolerance, impaired balance, impaired physical strength, lacks appropriate home exercise program, pain with function, safety issue and weight-bearing intolerance   Assessment details: Anaya was able to tolerate all exercise today without issues.  No pain noted. She did get her inserts for her shoes.  Worked on balance and strength work.  Good tolerance, may update HEP next visit.          Goals  Plan Goals: ANKLE PROBLEMS    1. The patient has limited ROM for the left ankle.   LTG 1: 12 weeks:  In order to allow the patient greater ease with forward, lateral, and diagonal mobility the patient will demonstrate improved ROM of the left ankle as follows:  20 degrees of dorsiflexion, 50 degrees of plantarflexion, 30 degrees of inversion, and 15 degrees of eversion.  STATUS:  Progressing   STG 1a: 4 weeks:  The patient will demonstrate improved ROM of the left ankle as follows:  30 degrees of dorsiflexion, 40 degrees of plantarflexion, and 12 degrees of eversion.    STATUS:  Progressing   TREATMENT: Manual therapy, therapeutic exercise, home exercise instruction, and modalities as needed to include:   moist heat, electrical stimulation, ultrasound, and ice.    2. The patient has limited strength of the left ankle.   LTG 2: 12 weeks:  In order to provide greater joint stability of the left ankle the patient will demonstrate improved strength of the left ankle as follows:  4+/5 dorsiflexion, 4+/5 inversion, 4+/5 eversion, and 4+/5 plantar flexion.    STATUS:  Progressing   STG 2a: 4 weeks:  The patient will demonstrate improved strength of the left ankle as follows:  4/5 dorsiflexion, 4/5 inversion, 4/5 eversion, and 4/5 plantar flexion.    STATUS:  Progressing   TREATMENT: Therapeutic exercise, home exercise instruction, aquatic therapy.    3. The patient has gait dysfunction.   LTG 3: 12 weeks:  The patient will ambulate without assistive device, independently, for community distances with minimal limp to the left lower extremity in order to improve mobility and allow patient to perform activities such as grocery shopping with greater ease.    STATUS:  Progressing   STG 3a: 4 weeks: The patient will be independent in [facility].    STATUS:  Progressing   TREATMENT: Gait training, therapeutic exercise, and home exercise instruction.    4. The patient complains of left ankle pain.  LTG 4: 12 weeks:  The patient will report a pain rating of 0/10 or better in order to improve tolerance to activities of daily living and improve sleep quality.  STATUS:  Progressing  STG 4a: 4 weeks:  The patient will report a pain rating of 1/10 or better.  STATUS:  Progressing  TREATMENT:  Therapeutic exercises, manual therapy, home exercise   instruction, and modalities as needed for pain to include:  electrical stimulation, moist heat, ice, ultrasound.      5. Mobility: Walking/Moving Around Functional Limitation     LTG 5: 12 weeks:  The patient will demonstrate limitation by achieving a score of 70/80 on the Lower Extremity Functional Scale.    STATUS:  New   STG 5a: 4 weeks:  The patient will demonstrate limitation by achieving a score  of 65/80 on the Lower Extremity Functional Scale.      STATUS:  New   TREATMENT:  Manual therapy, therapeutic exercise, home exercise instruction, and modalities as needed to include: moist heat, electrical stimulation, and ultrasound.      Plan  Therapy options: will be seen for skilled therapy services  Planned modality interventions: cryotherapy, dry needling, TENS, thermotherapy (hydrocollator packs) and ultrasound  Planned therapy interventions: balance/weight-bearing training, body mechanics training, fine motor coordination training, flexibility, functional ROM exercises, gait training, home exercise program, IADL retraining, joint mobilization, manual therapy, neuromuscular re-education, postural training, soft tissue mobilization, strengthening, stretching and therapeutic activities  Frequency: 2x week  Duration in weeks: 12  Treatment plan discussed with: patient  Plan details: Progress with L ankle and LE strength, balance and gait training, improved ROM/flexibility            Progress per Plan of Care and Progress strengthening /stabilization /functional activity           Timed:  Manual Therapy:         mins  09396;  Therapeutic Exercise:    12     mins  53231;     Neuromuscular Corina:    10    mins  88026;    Therapeutic Activity:     14     mins  27363;     Gait Training:           mins  88481;     Ultrasound:          mins  45499;    Canalith Repos           ___  mins  72018      Untimed:  Electrical Stimulation:         mins  74269 ( );  Mechanical Traction:         mins  11663;   Dry Needling:                    mins self pay       Timed Treatment:   36   mins   Total Treatment:     36   mins      Nadege Weinstein PT, [place] License #: [id number]

## 2024-08-26 ENCOUNTER — OFFICE VISIT (OUTPATIENT)
Dept: PODIATRY | Facility: CLINIC | Age: 64
End: 2024-08-26
Payer: COMMERCIAL

## 2024-08-26 ENCOUNTER — TELEPHONE (OUTPATIENT)
Dept: PODIATRY | Facility: CLINIC | Age: 64
End: 2024-08-26

## 2024-08-26 VITALS
HEART RATE: 68 BPM | DIASTOLIC BLOOD PRESSURE: 95 MMHG | WEIGHT: 254 LBS | HEIGHT: 65 IN | OXYGEN SATURATION: 96 % | BODY MASS INDEX: 42.32 KG/M2 | TEMPERATURE: 97.5 F | SYSTOLIC BLOOD PRESSURE: 162 MMHG

## 2024-08-26 DIAGNOSIS — M76.72 PERONEUS LONGUS TENDINITIS, LEFT: Primary | ICD-10-CM

## 2024-08-26 DIAGNOSIS — S93.602S FOOT SPRAIN, LEFT, SEQUELA: ICD-10-CM

## 2024-08-26 PROCEDURE — 99213 OFFICE O/P EST LOW 20 MIN: CPT | Performed by: PODIATRIST

## 2024-08-26 RX ORDER — MELOXICAM 15 MG/1
15 TABLET ORAL DAILY
Qty: 30 TABLET | Refills: 0 | Status: SHIPPED | OUTPATIENT
Start: 2024-08-26 | End: 2024-08-26 | Stop reason: SDUPTHER

## 2024-08-26 NOTE — PROGRESS NOTES
[facility] - PODIATRY    Today's Date: 24    Patient Name: Radha Herr  MRN: [id number]  CSN: [id number]  PCP: Swetha Krishna MD,   Referring Provider: No ref. provider found    SUBJECTIVE     Chief Complaint   Patient presents with    Left Foot - Follow-up     States she was doing better with physical therapy. States 3 weeks ago she slipped on garage floor with wet shoes and has has increased pain and swelling in the same are as stress fracture     HPI: Radha Herr, a 63 y.o.female, presents to clinic.    Patient states she twisted her foot on a wet floor and it is painful since then.  It has gotten better last few days.  Past Medical History:   Diagnosis Date    Allergic Began in my 30s progressively have gotten worse    Dust, pollen, mold, grass clippings, trees    Allergic rhinitis     Asymptomatic menopausal state     Closed right ankle fracture     Clotting disorder nose bleeds    Not enough skin covering my membranes    Cough     COVID-19     Difficulty walking     only because of injury    Diverticulosis     Essential (primary) hypertension     Headache sinus/allergies    History of fusion of cervical spine     C4-5    History of medical problems 23 & 23    had varithena vein surgery    Mild intermittent asthma     allergy induced, uses an inhaler 1x day    Other dorsalgia     Overflow incontinence     Peroneus longus tendinitis     Sprain of anterior talofibular ligament of left ankle      Past Surgical History:   Procedure Laterality Date    BILATERAL BREAST REDUCTION  2017    BREAST SURGERY  2017     SECTION      COLONOSCOPY      HYSTERECTOMY      LUMBAR DISCECTOMY FUSION INSTRUMENTATION      NASAL SEPTUM SURGERY      SINUS SURGERY      Deviated septum    VARICOSE VEIN SURGERY       Family History   Problem Relation Age of Onset    Cancer Mother             COPD Mother             Hypertension Mother             Heart attack  Father     Coronary artery disease Father     Hyperlipidemia Father             Hypertension Father             COPD Father             Stroke Father     Depression Father     Mental illness Father     Stroke Maternal Grandfather             Diabetes Maternal Grandmother              Social History     Socioeconomic History    Marital status:     Number of children: 2   Tobacco Use    Smoking status: Never    Smokeless tobacco: Never   Vaping Use    Vaping status: Never Used   Substance and Sexual Activity    Alcohol use: Not Currently    Drug use: Never    Sexual activity: Not Currently     Partners: Male     Birth control/protection: Hysterectomy     Allergies   Allergen Reactions    Statins Myalgia     Current Outpatient Medications   Medication Sig Dispense Refill    albuterol sulfate  (90 Base) MCG/ACT inhaler Inhale 1 puff Every 6 (Six) Hours As Needed for Wheezing or Shortness of Air. 6.7 g 0    Breo Ellipta 200-25 MCG/INH inhaler Inhale 1 puff Daily. 1 each 2    niacin (Niaspan) 500 MG CR tablet Take 1 tablet by mouth Every Night. 90 tablet 1     No current facility-administered medications for this visit.     Review of Systems   Constitutional: Negative.    HENT: Negative.     Eyes: Negative.    Respiratory: Negative.     Cardiovascular: Negative.    Gastrointestinal: Negative.    Endocrine: Negative.    Genitourinary: Negative.    Musculoskeletal: Negative.         Left foot pain   Skin: Negative.    Allergic/Immunologic: Negative.    Neurological: Negative.    Hematological: Negative.    Psychiatric/Behavioral: Negative.     All other systems reviewed and are negative.      OBJECTIVE     Vitals:    24 0843   BP: 162/95   Pulse: 68   Temp: 97.5 °F (36.4 °C)   SpO2: 96%       WBC   Date Value Ref Range Status   2022 5.85 3.40 - 10.80 10*3/mm3 Final     RBC   Date Value Ref Range Status   2022 4.78 3.77 - 5.28 10*6/mm3 Final      Hemoglobin   Date Value Ref Range Status   12/12/2022 13.9 12.0 - 15.9 g/dL Final     Hematocrit   Date Value Ref Range Status   12/12/2022 42.7 34.0 - 46.6 % Final     MCV   Date Value Ref Range Status   12/12/2022 89.3 79.0 - 97.0 fL Final     MCH   Date Value Ref Range Status   12/12/2022 29.1 26.6 - 33.0 pg Final     MCHC   Date Value Ref Range Status   12/12/2022 32.6 31.5 - 35.7 g/dL Final     RDW   Date Value Ref Range Status   12/12/2022 13.2 12.3 - 15.4 % Final     RDW-SD   Date Value Ref Range Status   12/12/2022 43.6 37.0 - 54.0 fl Final     MPV   Date Value Ref Range Status   12/12/2022 10.0 6.0 - 12.0 fL Final     Platelets   Date Value Ref Range Status   12/12/2022 295 140 - 450 10*3/mm3 Final     Neutrophils Absolute   Date Value Ref Range Status   07/07/2021 2.61 1.70 - 7.00 10*3/mm3 Final   07/07/2021 3.00 1.70 - 7.00 10*3/mm3 Final     Eosinophils Absolute   Date Value Ref Range Status   07/07/2021 0.09 0.00 - 0.40 10*3/mm3 Final   07/07/2021 0.23 0.00 - 0.40 10*3/mm3 Final     Basophils Absolute   Date Value Ref Range Status   07/07/2021 0.14 0.00 - 0.20 10*3/mm3 Final         Lab Results   Component Value Date    GLUCOSE 99 12/12/2022    BUN 13 12/12/2022    CREATININE 0.66 12/12/2022    EGFRIFNONA 86 07/07/2021    BCR 19.7 12/12/2022    K 4.3 12/12/2022    CO2 26.3 12/12/2022    CALCIUM 9.2 12/12/2022    ALBUMIN 4.40 12/12/2022    AST 17 12/12/2022    ALT 18 12/12/2022       Patient seen in no apparent distress.      PHYSICAL EXAM:     Foot/Ankle Exam    GENERAL  Appearance:  appears stated age  Orientation:  AAOx3  Affect:  appropriate  Gait:  unimpaired  Assistance:  independent  Right shoe gear: casual shoe  Left shoe gear: casual shoe    VASCULAR     Right Foot Vascularity   Normal vascular exam    Dorsalis pedis:  2+  Posterior tibial:  2+  Skin temperature:  warm  Edema grading:  None  CFT:  < 3 seconds  Pedal hair growth:  Present  Varicosities:  none     Left Foot Vascularity    Normal vascular exam    Dorsalis pedis:  2+  Posterior tibial:  2+  Skin temperature:  warm  Edema grading:  None  CFT:  < 3 seconds  Pedal hair growth:  Present  Varicosities:  none     NEUROLOGIC     Right Foot Neurologic   Normal sensation    Light touch sensation: normal  Vibratory sensation: normal  Hot/Cold sensation: normal  Protective Sensation using [place] Monofilament:   Sites intact: 10  Sites tested: 10     Left Foot Neurologic   Normal sensation    Light touch sensation: normal  Vibratory sensation: normal  Hot/Cold sensation:  normal  Protective Sensation using [place] Monofilament:   Sites intact: 10  Sites tested: 10    MUSCULOSKELETAL     Left Foot Musculoskeletal   Tenderness:  calcaneocuboid joint tenderness    MUSCLE STRENGTH     Right Foot Muscle Strength   Foot dorsiflexion:  4  Foot plantar flexion:  4  Foot inversion:  4  Foot eversion:  4     Left Foot Muscle Strength   Foot dorsiflexion:  4  Foot plantar flexion:  4  Foot inversion:  4  Foot eversion:  4    RANGE OF MOTION     Right Foot Range of Motion   Foot and ankle ROM within normal limits       Left Foot Range of Motion   Foot and ankle ROM within normal limits      DERMATOLOGIC      Right Foot Dermatologic   Skin  Right foot skin is intact.      Left Foot Dermatologic   Skin  Left foot skin is intact.       RADIOLOGY:        No results found.    ASSESSMENT/PLAN     Diagnoses and all orders for this visit:    1. Peroneus longus tendinitis, left (Primary)    2. Foot sprain, left, sequela            Patient to begin stretching exercises and icing in the evening as tolerated. Discussed compression therapy and resting the extremity.  Anti-inflammatory medication to begin taking if okay by PCP.    Return to clinic in 1 month or as needed    Comprehensive lower extremity examination and evaluation was performed.    Discussed findings and treatment plan including risks, benefits, and treatment options with patient in  detail. Patient agreed with treatment plan.    Medications and allergies reviewed.  Reviewed available lab values along with other pertinent labs.  These were discussed with the patient.    An After Visit Summary was printed and given to the patient at discharge, including (if requested) any available informative/educational handouts regarding diagnosis, treatment, or medications. All questions were answered to patient/family satisfaction. Should symptoms fail to improve or worsen they agree to call or return to clinic or to go to the Emergency Department. Discussed the importance of following up with any needed screening tests/labs/specialist appointments and any requested follow-up recommended by me today. Importance of maintaining follow-up discussed and patient accepts that missed appointments can delay diagnosis and potentially lead to worsening of conditions.    Return in about 1 month (around 9/26/2024), or if symptoms worsen or fail to improve., or sooner if acute issues arise.    This document has been electronically signed by Leighton Fletcher DPM on August 26, 2024 11:13 EDT

## 2024-09-19 ENCOUNTER — OFFICE VISIT (OUTPATIENT)
Dept: PODIATRY | Facility: CLINIC | Age: 64
End: 2024-09-19
Payer: COMMERCIAL

## 2024-09-19 VITALS
HEIGHT: 65 IN | SYSTOLIC BLOOD PRESSURE: 148 MMHG | WEIGHT: 275.2 LBS | OXYGEN SATURATION: 97 % | BODY MASS INDEX: 45.85 KG/M2 | HEART RATE: 73 BPM | RESPIRATION RATE: 17 BRPM | DIASTOLIC BLOOD PRESSURE: 79 MMHG

## 2024-09-19 DIAGNOSIS — S93.602S FOOT SPRAIN, LEFT, SEQUELA: Primary | ICD-10-CM

## 2024-09-19 DIAGNOSIS — M76.72 PERONEUS LONGUS TENDINITIS, LEFT: ICD-10-CM

## 2024-09-19 RX ADMIN — DEXAMETHASONE SODIUM PHOSPHATE 2 MG: 4 INJECTION, SOLUTION INTRA-ARTICULAR; INTRALESIONAL; INTRAMUSCULAR; INTRAVENOUS; SOFT TISSUE at 10:25

## 2024-09-19 RX ADMIN — BUPIVACAINE HYDROCHLORIDE 5 ML: 5 INJECTION, SOLUTION PERINEURAL at 10:25

## 2024-09-20 RX ORDER — DEXAMETHASONE SODIUM PHOSPHATE 4 MG/ML
2 INJECTION, SOLUTION INTRA-ARTICULAR; INTRALESIONAL; INTRAMUSCULAR; INTRAVENOUS; SOFT TISSUE ONCE
Status: COMPLETED | OUTPATIENT
Start: 2024-09-20 | End: 2024-09-19

## 2024-09-20 RX ORDER — BUPIVACAINE HYDROCHLORIDE 5 MG/ML
5 INJECTION, SOLUTION PERINEURAL ONCE
Status: COMPLETED | OUTPATIENT
Start: 2024-09-20 | End: 2024-09-19

## 2024-10-17 ENCOUNTER — OFFICE VISIT (OUTPATIENT)
Dept: PODIATRY | Facility: CLINIC | Age: 64
End: 2024-10-17
Payer: COMMERCIAL

## 2024-10-17 VITALS
BODY MASS INDEX: 45.76 KG/M2 | DIASTOLIC BLOOD PRESSURE: 92 MMHG | WEIGHT: 275 LBS | SYSTOLIC BLOOD PRESSURE: 175 MMHG | OXYGEN SATURATION: 98 % | HEART RATE: 82 BPM

## 2024-10-17 DIAGNOSIS — S93.602S FOOT SPRAIN, LEFT, SEQUELA: ICD-10-CM

## 2024-10-17 DIAGNOSIS — M79.2 NEURITIS: Primary | ICD-10-CM

## 2024-10-17 NOTE — PROGRESS NOTES
[facility] - PODIATRY    Today's Date: 10/17/24    Patient Name: Radha Herr  MRN: [id number]  CSN: [id number]  PCP: Swetha Krishna MD,   Referring Provider: No ref. provider found    SUBJECTIVE     Chief Complaint   Patient presents with    Left Foot - Follow-up, Pain     Peroneus longus tendinitis. She got a injection last visit, she still has oain     HPI: Radha Herr, a 63 y.o.female, presents to clinic.    Patient states she twisted her foot on a wet floor and it is painful since then.  It has gotten better last few days.    10/17/2024-no improvement with last injection.  States she has shooting pain and burning on her feet.  Past Medical History:   Diagnosis Date    Allergic Began in my 30s progressively have gotten worse    Dust, pollen, mold, grass clippings, trees    Allergic rhinitis     Asymptomatic menopausal state     Closed right ankle fracture     Clotting disorder nose bleeds    Not enough skin covering my membranes    Cough     COVID-19     Difficulty walking     only because of injury    Diverticulosis     Essential (primary) hypertension     Headache sinus/allergies    History of fusion of cervical spine     C4-5    History of medical problems 23 & 23    had varithena vein surgery    Mild intermittent asthma     allergy induced, uses an inhaler 1x day    Other dorsalgia     Overflow incontinence     Peroneus longus tendinitis     Sprain of anterior talofibular ligament of left ankle      Past Surgical History:   Procedure Laterality Date    BILATERAL BREAST REDUCTION  2017    BREAST SURGERY  2017     SECTION      COLONOSCOPY      HYSTERECTOMY      LUMBAR DISCECTOMY FUSION INSTRUMENTATION      NASAL SEPTUM SURGERY      SINUS SURGERY      Deviated septum    VARICOSE VEIN SURGERY       Family History   Problem Relation Age of Onset    Cancer Mother             COPD Mother             Hypertension Mother             Heart attack  Father     Coronary artery disease Father     Hyperlipidemia Father             Hypertension Father             COPD Father             Stroke Father     Depression Father     Mental illness Father     Stroke Maternal Grandfather             Diabetes Maternal Grandmother              Social History     Socioeconomic History    Marital status:     Number of children: 2   Tobacco Use    Smoking status: Never    Smokeless tobacco: Never   Vaping Use    Vaping status: Never Used   Substance and Sexual Activity    Alcohol use: Not Currently    Drug use: Never    Sexual activity: Not Currently     Partners: Male     Birth control/protection: Hysterectomy     Allergies   Allergen Reactions    Statins Myalgia     Current Outpatient Medications   Medication Sig Dispense Refill    albuterol sulfate  (90 Base) MCG/ACT inhaler Inhale 1 puff Every 6 (Six) Hours As Needed for Wheezing or Shortness of Air. 6.7 g 0    Breo Ellipta 200-25 MCG/INH inhaler Inhale 1 puff Daily. 1 each 2    niacin (Niaspan) 500 MG CR tablet Take 1 tablet by mouth Every Night. 90 tablet 1    diclofenac (VOLTAREN) 50 MG EC tablet Take 1 tablet by mouth 2 (Two) Times a Day. 60 tablet 0     No current facility-administered medications for this visit.     Review of Systems   Constitutional: Negative.    HENT: Negative.     Eyes: Negative.    Respiratory: Negative.     Cardiovascular: Negative.    Gastrointestinal: Negative.    Endocrine: Negative.    Genitourinary: Negative.    Musculoskeletal: Negative.         Left foot pain   Skin: Negative.    Allergic/Immunologic: Negative.    Neurological: Negative.    Hematological: Negative.    Psychiatric/Behavioral: Negative.     All other systems reviewed and are negative.      OBJECTIVE     Vitals:    10/17/24 1002   BP: 175/92   Pulse: 82   SpO2: 98%       WBC   Date Value Ref Range Status   2022 5.85 3.40 - 10.80 10*3/mm3 Final     RBC   Date Value Ref  Range Status   12/12/2022 4.78 3.77 - 5.28 10*6/mm3 Final     Hemoglobin   Date Value Ref Range Status   12/12/2022 13.9 12.0 - 15.9 g/dL Final     Hematocrit   Date Value Ref Range Status   12/12/2022 42.7 34.0 - 46.6 % Final     MCV   Date Value Ref Range Status   12/12/2022 89.3 79.0 - 97.0 fL Final     MCH   Date Value Ref Range Status   12/12/2022 29.1 26.6 - 33.0 pg Final     MCHC   Date Value Ref Range Status   12/12/2022 32.6 31.5 - 35.7 g/dL Final     RDW   Date Value Ref Range Status   12/12/2022 13.2 12.3 - 15.4 % Final     RDW-SD   Date Value Ref Range Status   12/12/2022 43.6 37.0 - 54.0 fl Final     MPV   Date Value Ref Range Status   12/12/2022 10.0 6.0 - 12.0 fL Final     Platelets   Date Value Ref Range Status   12/12/2022 295 140 - 450 10*3/mm3 Final     Neutrophils Absolute   Date Value Ref Range Status   07/07/2021 2.61 1.70 - 7.00 10*3/mm3 Final   07/07/2021 3.00 1.70 - 7.00 10*3/mm3 Final     Eosinophils Absolute   Date Value Ref Range Status   07/07/2021 0.09 0.00 - 0.40 10*3/mm3 Final   07/07/2021 0.23 0.00 - 0.40 10*3/mm3 Final     Basophils Absolute   Date Value Ref Range Status   07/07/2021 0.14 0.00 - 0.20 10*3/mm3 Final         Lab Results   Component Value Date    GLUCOSE 99 12/12/2022    BUN 13 12/12/2022    CREATININE 0.66 12/12/2022    EGFRIFNONA 86 07/07/2021    BCR 19.7 12/12/2022    K 4.3 12/12/2022    CO2 26.3 12/12/2022    CALCIUM 9.2 12/12/2022    ALBUMIN 4.40 12/12/2022    AST 17 12/12/2022    ALT 18 12/12/2022       Patient seen in no apparent distress.      PHYSICAL EXAM:     Foot/Ankle Exam    GENERAL  Appearance:  appears stated age  Orientation:  AAOx3  Affect:  appropriate  Gait:  unimpaired  Assistance:  independent  Right shoe gear: casual shoe  Left shoe gear: casual shoe    VASCULAR     Right Foot Vascularity   Normal vascular exam    Dorsalis pedis:  2+  Posterior tibial:  2+  Skin temperature:  warm  Edema grading:  None  CFT:  < 3 seconds  Pedal hair growth:   Present  Varicosities:  none     Left Foot Vascularity   Normal vascular exam    Dorsalis pedis:  2+  Posterior tibial:  2+  Skin temperature:  warm  Edema grading:  None  CFT:  < 3 seconds  Pedal hair growth:  Present  Varicosities:  none     NEUROLOGIC     Right Foot Neurologic   Normal sensation    Light touch sensation: normal  Vibratory sensation: normal  Hot/Cold sensation: normal  Protective Sensation using Dolph-George Monofilament:   Sites intact: 10  Sites tested: 10     Left Foot Neurologic   Normal sensation    Light touch sensation: normal  Vibratory sensation: normal  Hot/Cold sensation:  normal  Protective Sensation using Dolph-George Monofilament:   Sites intact: 10  Sites tested: 10    MUSCULOSKELETAL     Left Foot Musculoskeletal   Tenderness:  calcaneocuboid joint tenderness    MUSCLE STRENGTH     Right Foot Muscle Strength   Foot dorsiflexion:  4  Foot plantar flexion:  4  Foot inversion:  4  Foot eversion:  4     Left Foot Muscle Strength   Foot dorsiflexion:  4  Foot plantar flexion:  4  Foot inversion:  4  Foot eversion:  4    RANGE OF MOTION     Right Foot Range of Motion   Foot and ankle ROM within normal limits       Left Foot Range of Motion   Foot and ankle ROM within normal limits      DERMATOLOGIC      Right Foot Dermatologic   Skin  Right foot skin is intact.      Left Foot Dermatologic   Skin  Left foot skin is intact.       RADIOLOGY:        No results found.    ASSESSMENT/PLAN     Diagnoses and all orders for this visit:    1. Neuritis (Primary)  -     EMG & Nerve Conduction Test; Future    2. Foot sprain, left, sequela  -     EMG & Nerve Conduction Test; Future            Patient to begin stretching exercises and icing in the evening as tolerated. Discussed compression therapy and resting the extremity.  Anti-inflammatory medication to begin taking if okay by PCP.    EMG nerve conduction study ordered.  Further treatment pending study findings.    Comprehensive lower  extremity examination and evaluation was performed.    Discussed findings and treatment plan including risks, benefits, and treatment options with patient in detail. Patient agreed with treatment plan.    Medications and allergies reviewed.  Reviewed available lab values along with other pertinent labs.  These were discussed with the patient.    An After Visit Summary was printed and given to the patient at discharge, including (if requested) any available informative/educational handouts regarding diagnosis, treatment, or medications. All questions were answered to patient/family satisfaction. Should symptoms fail to improve or worsen they agree to call or return to clinic or to go to the Emergency Department. Discussed the importance of following up with any needed screening tests/labs/specialist appointments and any requested follow-up recommended by me today. Importance of maintaining follow-up discussed and patient accepts that missed appointments can delay diagnosis and potentially lead to worsening of conditions.    Return in about 1 month (around 11/17/2024)., or sooner if acute issues arise.    This document has been electronically signed by Leighton Fletcher DPM on October 17, 2024 10:35 EDT

## 2024-11-05 ENCOUNTER — HOSPITAL ENCOUNTER (OUTPATIENT)
Facility: HOSPITAL | Age: 64
Discharge: HOME | End: 2024-11-05
Admitting: PODIATRIST
Payer: COMMERCIAL

## 2024-11-05 DIAGNOSIS — S93.602S FOOT SPRAIN, LEFT, SEQUELA: ICD-10-CM

## 2024-11-05 DIAGNOSIS — M79.2 NEURITIS: ICD-10-CM

## 2024-11-05 PROCEDURE — 95886 MUSC TEST DONE W/N TEST COMP: CPT

## 2024-11-05 PROCEDURE — 95909 NRV CNDJ TST 5-6 STUDIES: CPT

## 2024-11-21 ENCOUNTER — OFFICE VISIT (OUTPATIENT)
Dept: PODIATRY | Facility: CLINIC | Age: 64
End: 2024-11-21
Payer: COMMERCIAL

## 2024-11-21 ENCOUNTER — TELEPHONE (OUTPATIENT)
Dept: FAMILY MEDICINE CLINIC | Age: 64
End: 2024-11-21
Payer: COMMERCIAL

## 2024-11-21 ENCOUNTER — TELEPHONE (OUTPATIENT)
Dept: PODIATRY | Facility: CLINIC | Age: 64
End: 2024-11-21

## 2024-11-21 VITALS
HEART RATE: 77 BPM | SYSTOLIC BLOOD PRESSURE: 161 MMHG | HEIGHT: 65 IN | BODY MASS INDEX: 45.82 KG/M2 | WEIGHT: 275 LBS | DIASTOLIC BLOOD PRESSURE: 91 MMHG | OXYGEN SATURATION: 97 %

## 2024-11-21 DIAGNOSIS — E78.2 MIXED HYPERLIPIDEMIA: ICD-10-CM

## 2024-11-21 DIAGNOSIS — M79.2 NEURITIS: Primary | ICD-10-CM

## 2024-11-21 DIAGNOSIS — M76.72 PERONEUS LONGUS TENDINITIS, LEFT: ICD-10-CM

## 2024-11-21 DIAGNOSIS — I10 PRIMARY HYPERTENSION: Primary | ICD-10-CM

## 2024-11-21 DIAGNOSIS — S93.602S FOOT SPRAIN, LEFT, SEQUELA: Primary | ICD-10-CM

## 2024-11-21 DIAGNOSIS — M79.2 NEURITIS: ICD-10-CM

## 2024-11-21 RX ORDER — GABAPENTIN 300 MG/1
300 CAPSULE ORAL DAILY
Qty: 30 CAPSULE | Refills: 0 | Status: SHIPPED | OUTPATIENT
Start: 2024-11-21

## 2024-11-21 NOTE — TELEPHONE ENCOUNTER
Home Care Instructions Following Your Hysteroscopy     Anjelina-  We hope you were pleased with your care at [facility].  We wish you the best outcome and overall experience with your operation.  These instructions will help to minimize pain, promote healing, and improve the likelihood of a successful result.    What to Expect:  Expect some vaginal bleeding and spotting for 1-2 weeks after your procedure  Use pads only. No tampons.  Call the office, if you experience heavy bleeding ( saturate one pad every hour)  It is typical to experience abdominal cramping for 1-2 weeks after your procedure.  You might experience a sore throat for up to one week after your operation.  A sore throat can be due to the breathing tube used for anesthesia during your operation.  Fatigue, tiredness, or a \"washed out\" feeling is typical in the first few days following an operation.    Bathing/Showers  You can resume showering tomorrow  No baths, swimming, or hot tubs for 2 weeks     Pain Medication  Vicoprofen: Take one  tablet every 4-6 hours as needed for pain.  Do not exceed 5 (five) tablets each day  Do not take Vicoprofen, if you do not have pain.    Other Prescription Medication  The following medication might have been prescribed for you:  Zofran is a medication which can help with nausea.  Take as prescribed  An antibiotic might also have been prescribed for you and is to be taken as directed    Over-The-Counter Medication  Non-prescription anti-inflammatory medications can also help to ease the pain.  You can take Aleve or ibuprofen in two days  Take as directed on the bottle  Drink a full glass of water with the medication. Do not take medication on an empty stomach.    Home Medication  Resume your home medications as instructed  You can resume your herbs and vitamins tomorrow    Diet  Resume your normal diet    Activity  Refrain from vaginal intercourse, vaginal suppositories, tampon use, and douches for 2 weeks  You can  Pt aware that Dr. Fletcher will be sending in medication by the end of the day.     go up and down the stairs as tolerated.    Walking at a normal pace is acceptable    Return to Work  You can return to work in 1-2 days  Contact your doctor's office, if you need a medical note.     Driving  Do not drive if you are  taking pain medication.    Follow-up Appointment with Your Gynecologist  Call the office tomorrow for an appointment per instructions  Verify your appointment date, day, time, and location.  At your 1st postoperative office visit:  Your recovery will be assessed, and any additional concerns and instructions will be discussed.    Davis/Catheter Removal   If you had a Davis Catheter placed prior to your hospital discharge, remove Davis Catheter in the early morning, the day after surgery (Post-Op Day 1). If you are unable to void within 6 hours of removing catheter, contact office immediately.    Questions or Concerns  Call the office if you experience severe pain not controlled by pain medication, swelling, bleeding, fever, or other concerns.  If your call is made after office hours, a physician's assistant or fellow will be available to help you.  There is always a doctor from the practice who is covering the patient calls.    Amie  Thank you for coming to [facility] for your operation.  The nurses and the anesthesiologist try very hard to make sure you receive the best care possible.  Your trust in them is greatly appreciated.    Thanks so much,  Dr. Bach, Dr. Chopra, and Dr. Woods  [facility]      You have been given Norco 5/325 for pain.  It was Given to you at: 3:15  Next pain medication dose due:  7:15 pm tonight  Take this medication as directed            Drink plenty of water  Alcoholic beverages should be avoided while taking narcotics

## 2024-11-21 NOTE — TELEPHONE ENCOUNTER
"Caller: Radha Herr \"Anaya\"    Relationship: Self    Best call back number: [phone]    Which medication are you concerned about: WOULD LIKE A STATUS OF GABAPENTIN PRESCRIPTION        "

## 2024-11-21 NOTE — PROGRESS NOTES
[facility] - PODIATRY    Today's Date: 24    Patient Name: Radha Herr  MRN: [id number]  CSN: [id number]  PCP: Swetha Krishna MD,   Referring Provider: No ref. provider found    SUBJECTIVE     Chief Complaint   Patient presents with    Left Foot - Follow-up, Results, Pain     [place]  follow up      HPI: Radha Herr, a 64 y.o.female, presents to clinic.    Patient is here for follow-up.  States that not much is changed on her feet she still has pain and it it has not improved.  Past Medical History:   Diagnosis Date    Allergic Began in my 30s progressively have gotten worse    Dust, pollen, mold, grass clippings, trees    Allergic rhinitis     Asymptomatic menopausal state     Closed right ankle fracture     Clotting disorder nose bleeds    Not enough skin covering my membranes    Cough     COVID-19     Difficulty walking     only because of injury    Diverticulosis     Essential (primary) hypertension     Headache sinus/allergies    History of fusion of cervical spine     C4-5    History of medical problems 23 & 23    had varithena vein surgery    Mild intermittent asthma     allergy induced, uses an inhaler 1x day    Other dorsalgia     Overflow incontinence     Peroneus longus tendinitis     Sprain of anterior talofibular ligament of left ankle      Past Surgical History:   Procedure Laterality Date    BILATERAL BREAST REDUCTION  2017    BREAST SURGERY  2017     SECTION      COLONOSCOPY      HYSTERECTOMY      LUMBAR DISCECTOMY FUSION INSTRUMENTATION      NASAL SEPTUM SURGERY      SINUS SURGERY      Deviated septum    VARICOSE VEIN SURGERY       Family History   Problem Relation Age of Onset    Cancer Mother             COPD Mother             Hypertension Mother             Heart attack Father     Coronary artery disease Father     Hyperlipidemia Father             Hypertension Father             COPD Father              Stroke Father     Depression Father     Mental illness Father     Stroke Maternal Grandfather             Diabetes Maternal Grandmother              Social History     Socioeconomic History    Marital status:     Number of children: 2   Tobacco Use    Smoking status: Never    Smokeless tobacco: Never   Vaping Use    Vaping status: Never Used   Substance and Sexual Activity    Alcohol use: Not Currently    Drug use: Never    Sexual activity: Not Currently     Partners: Male     Birth control/protection: Hysterectomy     Allergies   Allergen Reactions    Statins Myalgia     Current Outpatient Medications   Medication Sig Dispense Refill    Breo Ellipta 200-25 MCG/INH inhaler Inhale 1 puff Daily. 1 each 2    niacin (Niaspan) 500 MG CR tablet Take 1 tablet by mouth Every Night. 90 tablet 1    gabapentin (Neurontin) 300 MG capsule Take 1 capsule by mouth Daily. 30 capsule 0     No current facility-administered medications for this visit.     Review of Systems   Constitutional: Negative.    HENT: Negative.     Eyes: Negative.    Respiratory: Negative.     Cardiovascular: Negative.    Gastrointestinal: Negative.    Endocrine: Negative.    Genitourinary: Negative.    Musculoskeletal: Negative.         Left foot pain   Skin: Negative.    Allergic/Immunologic: Negative.    Neurological: Negative.    Hematological: Negative.    Psychiatric/Behavioral: Negative.     All other systems reviewed and are negative.      OBJECTIVE     Vitals:    24 1022   BP: 161/91   Pulse: 77   SpO2: 97%       WBC   Date Value Ref Range Status   2024 5.17 3.40 - 10.80 10*3/mm3 Final     RBC   Date Value Ref Range Status   2024 4.70 3.77 - 5.28 10*6/mm3 Final     Hemoglobin   Date Value Ref Range Status   2024 13.6 12.0 - 15.9 g/dL Final     Hematocrit   Date Value Ref Range Status   2024 41.7 34.0 - 46.6 % Final     MCV   Date Value Ref Range Status   2024 88.7 79.0 - 97.0 fL Final      MCH   Date Value Ref Range Status   11/25/2024 28.9 26.6 - 33.0 pg Final     MCHC   Date Value Ref Range Status   11/25/2024 32.6 31.5 - 35.7 g/dL Final     RDW   Date Value Ref Range Status   11/25/2024 12.9 12.3 - 15.4 % Final     RDW-SD   Date Value Ref Range Status   11/25/2024 42.5 37.0 - 54.0 fl Final     MPV   Date Value Ref Range Status   11/25/2024 10.3 6.0 - 12.0 fL Final     Platelets   Date Value Ref Range Status   11/25/2024 270 140 - 450 10*3/mm3 Final     Neutrophils Absolute   Date Value Ref Range Status   07/07/2021 2.61 1.70 - 7.00 10*3/mm3 Final   07/07/2021 3.00 1.70 - 7.00 10*3/mm3 Final     Eosinophils Absolute   Date Value Ref Range Status   07/07/2021 0.09 0.00 - 0.40 10*3/mm3 Final   07/07/2021 0.23 0.00 - 0.40 10*3/mm3 Final     Basophils Absolute   Date Value Ref Range Status   07/07/2021 0.14 0.00 - 0.20 10*3/mm3 Final         Lab Results   Component Value Date    GLUCOSE 103 (H) 11/25/2024    BUN 15 11/25/2024    CREATININE 0.88 11/25/2024    EGFRIFNONA 86 07/07/2021    BCR 17.0 11/25/2024    K 4.0 11/25/2024    CO2 26.4 11/25/2024    CALCIUM 9.5 11/25/2024    ALBUMIN 3.8 11/25/2024    AST 16 11/25/2024    ALT 27 11/25/2024       Patient seen in no apparent distress.      PHYSICAL EXAM:     Foot/Ankle Exam    GENERAL  Appearance:  appears stated age  Orientation:  AAOx3  Affect:  appropriate  Gait:  unimpaired  Assistance:  independent  Right shoe gear: casual shoe  Left shoe gear: casual shoe    VASCULAR     Right Foot Vascularity   Normal vascular exam    Dorsalis pedis:  2+  Posterior tibial:  2+  Skin temperature:  warm  Edema grading:  None  CFT:  < 3 seconds  Pedal hair growth:  Present  Varicosities:  none     Left Foot Vascularity   Normal vascular exam    Dorsalis pedis:  2+  Posterior tibial:  2+  Skin temperature:  warm  Edema grading:  None  CFT:  < 3 seconds  Pedal hair growth:  Present  Varicosities:  none     NEUROLOGIC     Right Foot Neurologic   Normal sensation     Light touch sensation: normal  Vibratory sensation: normal  Hot/Cold sensation: normal  Protective Sensation using Elsie-George Monofilament:   Sites intact: 10  Sites tested: 10     Left Foot Neurologic   Normal sensation    Light touch sensation: normal  Vibratory sensation: normal  Hot/Cold sensation:  normal  Protective Sensation using Elsie-George Monofilament:   Sites intact: 10  Sites tested: 10    MUSCULOSKELETAL     Left Foot Musculoskeletal   Tenderness:  calcaneocuboid joint tenderness    MUSCLE STRENGTH     Right Foot Muscle Strength   Foot dorsiflexion:  4  Foot plantar flexion:  4  Foot inversion:  4  Foot eversion:  4     Left Foot Muscle Strength   Foot dorsiflexion:  4  Foot plantar flexion:  4  Foot inversion:  4  Foot eversion:  4    RANGE OF MOTION     Right Foot Range of Motion   Foot and ankle ROM within normal limits       Left Foot Range of Motion   Foot and ankle ROM within normal limits      DERMATOLOGIC      Right Foot Dermatologic   Skin  Right foot skin is intact.      Left Foot Dermatologic   Skin  Left foot skin is intact.       RADIOLOGY:        EMG & Nerve Conduction Test    Result Date: 11/6/2024  Narrative: See attached EMG/NCS report. Electronically signed by Nathaanel Martinez PT, 11/06/24, 4:54 PM EST.      ASSESSMENT/PLAN     Diagnoses and all orders for this visit:    1. Foot sprain, left, sequela (Primary)    2. Peroneus longus tendinitis, left    3. Neuritis        Discussed EMG nerve conduction study.  Patient with pinpoint pain along peroneus brevis and longus on the left foot.      Patient like to proceed with surgical intervention.  Discussed exploration of the tendon with possible repair, possible accessory ossicle removal.  Patient is agreeable to plan.      The risks and benefits of the surgery were discussed with the patient.  This discussion included possible complications of requiring further surgery, possible delayed wound healing, further surgery requiring  amputation of the foot or leg, and also included the complication of death.  All the patient's questions were answered to their satisfaction.  Patient states they would like to proceed with the procedure.    Comprehensive lower extremity examination and evaluation was performed.    Discussed findings and treatment plan including risks, benefits, and treatment options with patient in detail. Patient agreed with treatment plan.    Medications and allergies reviewed.  Reviewed available lab values along with other pertinent labs.  These were discussed with the patient.    An After Visit Summary was printed and given to the patient at discharge, including (if requested) any available informative/educational handouts regarding diagnosis, treatment, or medications. All questions were answered to patient/family satisfaction. Should symptoms fail to improve or worsen they agree to call or return to clinic or to go to the Emergency Department. Discussed the importance of following up with any needed screening tests/labs/specialist appointments and any requested follow-up recommended by me today. Importance of maintaining follow-up discussed and patient accepts that missed appointments can delay diagnosis and potentially lead to worsening of conditions.    No follow-ups on file., or sooner if acute issues arise.    This document has been electronically signed by Leighton Fletcher DPM on November 26, 2024 11:20 EST

## 2024-11-25 ENCOUNTER — LAB (OUTPATIENT)
Dept: LAB | Facility: HOSPITAL | Age: 64
End: 2024-11-25
Payer: COMMERCIAL

## 2024-11-25 DIAGNOSIS — E78.2 MIXED HYPERLIPIDEMIA: ICD-10-CM

## 2024-11-25 DIAGNOSIS — I10 PRIMARY HYPERTENSION: ICD-10-CM

## 2024-11-25 LAB
ALBUMIN SERPL-MCNC: 3.8 G/DL (ref 3.5–5.2)
ALBUMIN/GLOB SERPL: 1.2 G/DL
ALP SERPL-CCNC: 67 U/L (ref 39–117)
ALT SERPL W P-5'-P-CCNC: 27 U/L (ref 1–33)
ANION GAP SERPL CALCULATED.3IONS-SCNC: 8.6 MMOL/L (ref 5–15)
AST SERPL-CCNC: 16 U/L (ref 1–32)
BILIRUB SERPL-MCNC: 0.4 MG/DL (ref 0–1.2)
BUN SERPL-MCNC: 15 MG/DL (ref 8–23)
BUN/CREAT SERPL: 17 (ref 7–25)
CALCIUM SPEC-SCNC: 9.5 MG/DL (ref 8.6–10.5)
CHLORIDE SERPL-SCNC: 105 MMOL/L (ref 98–107)
CHOLEST SERPL-MCNC: 248 MG/DL (ref 0–200)
CO2 SERPL-SCNC: 26.4 MMOL/L (ref 22–29)
CREAT SERPL-MCNC: 0.88 MG/DL (ref 0.57–1)
DEPRECATED RDW RBC AUTO: 42.5 FL (ref 37–54)
EGFRCR SERPLBLD CKD-EPI 2021: 73.9 ML/MIN/1.73
ERYTHROCYTE [DISTWIDTH] IN BLOOD BY AUTOMATED COUNT: 12.9 % (ref 12.3–15.4)
GLOBULIN UR ELPH-MCNC: 3.1 GM/DL
GLUCOSE SERPL-MCNC: 103 MG/DL (ref 65–99)
HCT VFR BLD AUTO: 41.7 % (ref 34–46.6)
HDLC SERPL-MCNC: 56 MG/DL (ref 40–60)
HGB BLD-MCNC: 13.6 G/DL (ref 12–15.9)
LDLC SERPL CALC-MCNC: 163 MG/DL (ref 0–100)
LDLC/HDLC SERPL: 2.86 {RATIO}
MCH RBC QN AUTO: 28.9 PG (ref 26.6–33)
MCHC RBC AUTO-ENTMCNC: 32.6 G/DL (ref 31.5–35.7)
MCV RBC AUTO: 88.7 FL (ref 79–97)
PLATELET # BLD AUTO: 270 10*3/MM3 (ref 140–450)
PMV BLD AUTO: 10.3 FL (ref 6–12)
POTASSIUM SERPL-SCNC: 4 MMOL/L (ref 3.5–5.2)
PROT SERPL-MCNC: 6.9 G/DL (ref 6–8.5)
RBC # BLD AUTO: 4.7 10*6/MM3 (ref 3.77–5.28)
SODIUM SERPL-SCNC: 140 MMOL/L (ref 136–145)
TRIGL SERPL-MCNC: 160 MG/DL (ref 0–150)
VLDLC SERPL-MCNC: 29 MG/DL (ref 5–40)
WBC NRBC COR # BLD AUTO: 5.17 10*3/MM3 (ref 3.4–10.8)

## 2024-11-25 PROCEDURE — 85027 COMPLETE CBC AUTOMATED: CPT

## 2024-11-25 PROCEDURE — 36415 COLL VENOUS BLD VENIPUNCTURE: CPT

## 2024-11-25 PROCEDURE — 80061 LIPID PANEL: CPT

## 2024-11-25 PROCEDURE — 80053 COMPREHEN METABOLIC PANEL: CPT

## 2024-12-02 ENCOUNTER — OFFICE VISIT (OUTPATIENT)
Dept: FAMILY MEDICINE CLINIC | Age: 64
End: 2024-12-02
Payer: COMMERCIAL

## 2024-12-02 VITALS
DIASTOLIC BLOOD PRESSURE: 79 MMHG | BODY MASS INDEX: 47.15 KG/M2 | OXYGEN SATURATION: 98 % | WEIGHT: 283 LBS | SYSTOLIC BLOOD PRESSURE: 178 MMHG | HEART RATE: 78 BPM | HEIGHT: 65 IN

## 2024-12-02 DIAGNOSIS — Z12.11 ENCOUNTER FOR SCREENING COLONOSCOPY: ICD-10-CM

## 2024-12-02 DIAGNOSIS — Z78.0 POSTMENOPAUSAL STATE: ICD-10-CM

## 2024-12-02 DIAGNOSIS — J45.20 MILD INTERMITTENT ASTHMA WITHOUT COMPLICATION: ICD-10-CM

## 2024-12-02 DIAGNOSIS — Z01.419 WELL WOMAN EXAM: Primary | ICD-10-CM

## 2024-12-02 DIAGNOSIS — E78.2 MIXED HYPERLIPIDEMIA: ICD-10-CM

## 2024-12-02 DIAGNOSIS — Z23 ENCOUNTER FOR IMMUNIZATION: ICD-10-CM

## 2024-12-02 DIAGNOSIS — Z00.00 ANNUAL PHYSICAL EXAM: ICD-10-CM

## 2024-12-02 DIAGNOSIS — D49.2 ATYPICAL SQUAMOPROLIFERATIVE SKIN LESION: ICD-10-CM

## 2024-12-02 DIAGNOSIS — Z11.59 ENCOUNTER FOR SCREENING FOR OTHER VIRAL DISEASES: ICD-10-CM

## 2024-12-02 DIAGNOSIS — Z79.899 LONG-TERM USE OF HIGH-RISK MEDICATION: ICD-10-CM

## 2024-12-02 DIAGNOSIS — Z12.31 ENCOUNTER FOR SCREENING MAMMOGRAM FOR BREAST CANCER: ICD-10-CM

## 2024-12-02 DIAGNOSIS — I10 PRIMARY HYPERTENSION: ICD-10-CM

## 2024-12-02 DIAGNOSIS — R07.89 CHEST PRESSURE: ICD-10-CM

## 2024-12-02 PROCEDURE — 99396 PREV VISIT EST AGE 40-64: CPT | Performed by: FAMILY MEDICINE

## 2024-12-02 PROCEDURE — 93000 ELECTROCARDIOGRAM COMPLETE: CPT | Performed by: FAMILY MEDICINE

## 2024-12-02 PROCEDURE — 99214 OFFICE O/P EST MOD 30 MIN: CPT | Performed by: FAMILY MEDICINE

## 2024-12-02 RX ORDER — FENOFIBRATE 145 MG/1
145 TABLET, COATED ORAL DAILY
Qty: 90 TABLET | Refills: 1 | Status: SHIPPED | OUTPATIENT
Start: 2024-12-02

## 2024-12-02 RX ORDER — FLUTICASONE FUROATE AND VILANTEROL 200; 25 UG/1; UG/1
1 POWDER RESPIRATORY (INHALATION) DAILY
Qty: 3 EACH | Refills: 1 | Status: SHIPPED | OUTPATIENT
Start: 2024-12-02

## 2024-12-02 RX ORDER — LOSARTAN POTASSIUM 25 MG/1
50 TABLET ORAL DAILY PRN
Qty: 90 TABLET | Refills: 1 | Status: SHIPPED | OUTPATIENT
Start: 2024-12-02

## 2024-12-02 RX ORDER — LOSARTAN POTASSIUM 25 MG/1
25 TABLET ORAL DAILY
Qty: 90 TABLET | Refills: 1 | Status: SHIPPED | OUTPATIENT
Start: 2024-12-02 | End: 2024-12-02

## 2024-12-02 NOTE — PROGRESS NOTES
Radha Herr presents to [facility].    Chief Complaint: Well woman exam and medication refills  Subjective       History of Present Illness:      Patient is in today for yearly physical and WWE.  S/p hysterectomy 20 years ago  Last Pap: 20 years ago  M1  Birth control: hysterectomy  Last mammogram:  2023-normal  Last DEXA: 2023  Last colonoscopy: never  Urinary symptoms:  bladder leakage  Sexual concerns: none  EtOH use: NONE  Tobacco use: NONE  She wears a seatbelt in the car  Drug use: NONE  Last eye exam: 6 months ago Dr Spring  Dental exams every 6 months  IMMUNIZATIONS:  TDAP, PREVNAR 20, COVID BOOSTER, FLU, SHINGRIX  DIET:  unhealthy-defers dietician referral  EXERCISE: unable to exercise with foot fracture  HEARING:  no issues    She presents with asthma, stable on  breo and tries to wean herself off this med and is unable due to worsening shortness of air. She also suffers from chronic cough, daily, and she does has chronic allergies, she is on xyzal her allergies are not any better, she has ongoing runny nose, stuffy nose, PND and sinus pressure.  She has tried and failed flonase (nose bleeds)/singulair/allegra/claritan in past     She has chronic hypertension with ongoing high blood pressures, very high today but she reports that her home BP are normal, she is on losartan.  Tolerates med well.  Had to stop HCTZ because it made her heart races on this medication    She has underlying H/O chronic bladder leakage and incontinence, tried kegel exercises without improvement, she is s/p 2 C sections and hysterectomy and oophrectomy in past.  Urine is clear and no odor      She presents with chronic hypercholesterolemia, started on crestor and it caused body aches so she stopped this med, her cholesterol is much worse at 248Review of Systems:  Review of Systems   Constitutional:  Negative for chills, fatigue and fever.   HENT:  Positive for congestion. Negative for  ear pain, sinus pressure and sore throat.    Eyes:  Negative for blurred vision and double vision.   Respiratory:  Negative for cough, shortness of breath and wheezing.    Cardiovascular:  Positive for chest pain. Negative for palpitations and leg swelling.   Gastrointestinal:  Negative for abdominal pain, blood in stool, constipation, diarrhea, nausea and vomiting.   Skin:  Negative for rash.   Neurological:  Negative for dizziness and headache.   Psychiatric/Behavioral:  Negative for sleep disturbance, suicidal ideas and depressed mood. The patient is not nervous/anxious.             Objective   Medical History:  Past Medical History:    Allergic    Dust, pollen, mold, grass clippings, trees    Allergic rhinitis    Asymptomatic menopausal state    Closed right ankle fracture    Clotting disorder    Not enough skin covering my membranes    Cough    COVID-19    Difficulty walking    only because of injury    Diverticulosis    Essential (primary) hypertension    Headache    History of fusion of cervical spine    C4-5    History of medical problems    had varithena vein surgery    Mild intermittent asthma    allergy induced, uses an inhaler 1x day    Other dorsalgia    Overflow incontinence    Peroneus longus tendinitis    Sprain of anterior talofibular ligament of left ankle     Past Surgical History:    BILATERAL BREAST REDUCTION    BREAST SURGERY     SECTION    COLONOSCOPY    HYSTERECTOMY    LUMBAR DISCECTOMY FUSION INSTRUMENTATION    NASAL SEPTUM SURGERY    SINUS SURGERY    Deviated septum    VARICOSE VEIN SURGERY      Family History   Problem Relation Age of Onset    Cancer Mother             COPD Mother             Hypertension Mother             Heart attack Father     Coronary artery disease Father     Hyperlipidemia Father             Hypertension Father             COPD Father             Stroke Father     Depression Father     Mental illness Father      "Stroke Maternal Grandfather             Diabetes Maternal Grandmother              Social History     Tobacco Use    Smoking status: Never     Passive exposure: Never    Smokeless tobacco: Never   Substance Use Topics    Alcohol use: Not Currently       Health Maintenance Due   Topic Date Due    Pneumococcal Vaccine 0-64 (1 of 2 - PCV) Never done    TDAP/TD VACCINES (1 - Tdap) Never done    ZOSTER VACCINE (1 of 2) Never done    HEPATITIS C SCREENING  Never done    COLORECTAL CANCER SCREENING  2024        Immunization History   Administered Date(s) Administered    COVID-19 (PFIZER) Purple Cap Monovalent 06/15/2021, 2021       Allergies   Allergen Reactions    Statins Myalgia        Medications:  Current Outpatient Medications on File Prior to Visit   Medication Sig    gabapentin (Neurontin) 300 MG capsule Take 1 capsule by mouth Daily.     No current facility-administered medications on file prior to visit.       Vital Signs:   /79 (BP Location: Right arm, Patient Position: Lying, Cuff Size: Large Adult)   Pulse 78   Ht 165.1 cm (65\")   Wt 128 kg (283 lb)   SpO2 98%   BMI 47.09 kg/m²       Physical Exam:  Physical Exam  Vitals reviewed.   Constitutional:       General: She is not in acute distress.     Appearance: Normal appearance. She is normal weight. She is not ill-appearing.   HENT:      Head: Normocephalic and atraumatic.      Right Ear: Tympanic membrane normal. There is no impacted cerumen.      Left Ear: Tympanic membrane normal. There is no impacted cerumen.      Mouth/Throat:      Mouth: Mucous membranes are moist.      Pharynx: Oropharynx is clear.   Eyes:      Extraocular Movements: Extraocular movements intact.      Conjunctiva/sclera: Conjunctivae normal.      Pupils: Pupils are equal, round, and reactive to light.   Neck:      Vascular: No carotid bruit.   Cardiovascular:      Rate and Rhythm: Normal rate and regular rhythm.      Pulses: Normal pulses.      Heart " sounds: No murmur heard.  Pulmonary:      Effort: Pulmonary effort is normal.      Breath sounds: No wheezing, rhonchi or rales.   Chest:   Breasts:     Prabhakar Score is 5.      Right: Normal.      Left: Normal.   Abdominal:      General: Bowel sounds are normal.      Palpations: Abdomen is soft.      Tenderness: There is no abdominal tenderness.      Hernia: There is no hernia in the left inguinal area or right inguinal area.   Genitourinary:     General: Normal vulva.      Labia:         Right: No rash.         Left: No rash.       Urethra: No prolapse.      Vagina: Normal.      Uterus: Absent.       Adnexa: Right adnexa normal and left adnexa normal.      Rectum: Normal. Guaiac result negative. No mass, tenderness, anal fissure, external hemorrhoid or internal hemorrhoid. Normal anal tone.      Comments: Pelvic exam neg for findings  Musculoskeletal:         General: No swelling. Normal range of motion.      Cervical back: No rigidity or tenderness.   Lymphadenopathy:      Cervical: No cervical adenopathy.      Upper Body:      Right upper body: No axillary adenopathy.      Left upper body: No axillary adenopathy.   Skin:     General: Skin is warm and dry.   Neurological:      General: No focal deficit present.      Mental Status: She is alert and oriented to person, place, and time.      Gait: Gait normal.   Psychiatric:         Mood and Affect: Mood normal.         Behavior: Behavior normal.         Thought Content: Thought content normal.         Judgment: Judgment normal.         Result Review      The following data was reviewed by Swetha Krishna MD on 07/15/2021.  Lab Results   Component Value Date    WBC 5.17 11/25/2024    HGB 13.6 11/25/2024    HCT 41.7 11/25/2024    MCV 88.7 11/25/2024     11/25/2024     Lab Results   Component Value Date    GLUCOSE 103 (H) 11/25/2024    BUN 15 11/25/2024    CREATININE 0.88 11/25/2024    EGFRIFNONA 86 07/07/2021    BCR 17.0 11/25/2024    K 4.0 11/25/2024     "CO2 26.4 11/25/2024    CALCIUM 9.5 11/25/2024    ALBUMIN 3.8 11/25/2024    AST 16 11/25/2024    ALT 27 11/25/2024     Lab Results   Component Value Date    CHOL 248 (H) 11/25/2024    TRIG 160 (H) 11/25/2024    HDL 56 11/25/2024     (H) 11/25/2024     Lab Results   Component Value Date    TSH 1.520 07/07/2021     No results found for: \"HGBA1C\"  No results found for: \"PSA\"             ECG 12 Lead    Date/Time: 12/2/2024 4:10 PM  Performed by: Swetha Krishna MD    Authorized by: Swetha Krishna MD  Comparison: not compared with previous ECG   Rhythm: sinus rhythm  Rate: normal  Conduction: conduction normal  ST Segments: ST segments normal  T inversion: V1  T flattening: III  QRS axis: normal  Other: no other findings    Clinical impression: normal ECG  Comments: Normal sinus rhythm.  Dr. Krishna              Assessment and Plan: Diagnoses and all orders for this visit:    1. Well woman exam (Primary)  Comments:  Pelvic exam and breast exam WNL.  No pap performed due to hysterectomy    2. Annual physical exam    3. Long-term use of high-risk medication  -     Cancel: POC Medline 12 Panel Urine Drug Screen    4. Encounter for screening colonoscopy  Comments:  She is aware that she needs a screening colonoscopy and she is well overdue  Orders:  -     Ambulatory Referral For Screening Colonoscopy    5. Encounter for screening mammogram for breast cancer  Comments:  Recommend mammogram yearly    6. Encounter for immunization  Comments:  Recommend Tdap, Prevnar, COVID, flu and Shingrix vaccine    7. Postmenopausal state  Comments:  Recommend DEXA every 2 to 3 years, DEXA is up-to-date    8. Encounter for screening for other viral diseases  Comments:  Will check a hepatitis C viral screen with lab work  Orders:  -     Hepatitis C antibody; Future    9. Primary hypertension  Comments:  Not at goal.  Will increase losartan to 50 mg daily and have her check home BP and call if BP is> 140/90, she is to avoid NA " in diet  Orders:  -     Discontinue: losartan (Cozaar) 25 MG tablet; Take 1 tablet by mouth Daily.  Dispense: 90 tablet; Refill: 1  -     losartan (Cozaar) 25 MG tablet; Take 1 tablet by mouth daily. May increase to Take 2 tablets by mouth Daily if blood pressure at home is greater than 140/90  Dispense: 90 tablet; Refill: 1    10. Mixed hyperlipidemia  Comments:  Currently off statin, she did not tolerate.  Cholesterol is very high at 248.  Will try her on Tricor and recommend low-cholesterol diet    11. Mild intermittent asthma without complication  Comments:  Overall stable.  Will refill meds as needed  Orders:  -     Fluticasone Furoate-Vilanterol (Breo Ellipta) 200-25 MCG/ACT inhaler; Inhale 1 puff Daily.  Dispense: 3 each; Refill: 1    12. Chest pressure  Comments:  EKG reviewed with her and looks okay.  She needs to get her blood pressure and cholesterol under tighter control.  Recommend healthy diet and daily exercise  Orders:  -     ECG 12 Lead    13. Atypical squamoproliferative skin lesion  Comments:  present x 1 month ago, looked like a pimple, not healing  Orders:  -     Ambulatory Referral to Dermatology    Other orders  -     Diclofenac Sodium (VOLTAREN) 1 % gel gel; Apply 4 g topically to the appropriate area as directed 4 (Four) Times a Day As Needed (arthritis) for up to 30 days.  Dispense: 100 g; Refill: 2  -     fenofibrate (Tricor) 145 MG tablet; Take 1 tablet by mouth Daily.  Dispense: 90 tablet; Refill: 1             Follow Up   No follow-ups on file.  Patient was given instructions and counseling regarding her condition or for health maintenance advice. Please see specific information pulled into the AVS if appropriate.

## 2024-12-23 ENCOUNTER — PRIOR AUTHORIZATION (OUTPATIENT)
Dept: FAMILY MEDICINE CLINIC | Age: 64
End: 2024-12-23
Payer: COMMERCIAL

## 2024-12-23 DIAGNOSIS — J45.20 MILD INTERMITTENT ASTHMA WITHOUT COMPLICATION: ICD-10-CM

## 2024-12-23 DIAGNOSIS — M79.2 NEURITIS: ICD-10-CM

## 2024-12-23 RX ORDER — FLUTICASONE FUROATE AND VILANTEROL 200; 25 UG/1; UG/1
1 POWDER RESPIRATORY (INHALATION) DAILY
Qty: 180 EACH | Refills: 1 | Status: SHIPPED | OUTPATIENT
Start: 2024-12-23

## 2024-12-23 RX ORDER — GABAPENTIN 300 MG/1
300 CAPSULE ORAL DAILY
Qty: 30 CAPSULE | Refills: 0 | Status: SHIPPED | OUTPATIENT
Start: 2024-12-23

## 2024-12-23 NOTE — TELEPHONE ENCOUNTER
Patient requested inhaler be sent to [facility] when approved by insurance. Insurance prefers brand name, and went through at pharmacy.

## 2024-12-23 NOTE — TELEPHONE ENCOUNTER
Caller: JANET     Relationship: SELF    Best call back number: [phone] (home)         Requested Prescriptions:   Requested Prescriptions     Pending Prescriptions Disp Refills    gabapentin (Neurontin) 300 MG capsule 30 capsule 0     Sig: Take 1 capsule by mouth Daily.        Pharmacy where request should be sent: [facility]     Last office visit with prescribing clinician: 11/21/2024   Last telemedicine visit with prescribing clinician: Visit date not found   Next office visit with prescribing clinician: 1/16/2025     Additional details provided by patient:     Does the patient have less than a 3 day supply:  [x] Yes  [] No    Would you like a call back once the refill request has been completed: [x] Yes [] No    If the office needs to give you a call back, can they leave a voicemail: [x] Yes [] No    Simin Moses, PCT   12/23/24 07:38 EST

## 2025-01-16 ENCOUNTER — OFFICE VISIT (OUTPATIENT)
Dept: PODIATRY | Facility: CLINIC | Age: 65
End: 2025-01-16
Payer: COMMERCIAL

## 2025-01-16 VITALS
WEIGHT: 281 LBS | DIASTOLIC BLOOD PRESSURE: 76 MMHG | HEART RATE: 84 BPM | BODY MASS INDEX: 46.82 KG/M2 | SYSTOLIC BLOOD PRESSURE: 147 MMHG | OXYGEN SATURATION: 98 % | HEIGHT: 65 IN

## 2025-01-16 DIAGNOSIS — M76.72 PERONEUS LONGUS TENDINITIS, LEFT: ICD-10-CM

## 2025-01-16 DIAGNOSIS — M79.2 NEURITIS: Primary | ICD-10-CM

## 2025-01-16 DIAGNOSIS — S93.602S FOOT SPRAIN, LEFT, SEQUELA: ICD-10-CM

## 2025-01-16 RX ORDER — SODIUM CHLORIDE 0.9 % (FLUSH) 0.9 %
10 SYRINGE (ML) INJECTION EVERY 12 HOURS SCHEDULED
OUTPATIENT
Start: 2025-01-16

## 2025-01-16 RX ORDER — SODIUM CHLORIDE 0.9 % (FLUSH) 0.9 %
10 SYRINGE (ML) INJECTION AS NEEDED
OUTPATIENT
Start: 2025-01-16

## 2025-01-16 RX ORDER — SODIUM CHLORIDE 9 MG/ML
40 INJECTION, SOLUTION INTRAVENOUS AS NEEDED
OUTPATIENT
Start: 2025-01-16

## 2025-01-16 NOTE — PROGRESS NOTES
[facility] - PODIATRY    Today's Date: 25    Patient Name: Radha Herr  MRN: [id number]  CSN: [id number]  PCP: Swetha Krishna MD,   Referring Provider: No ref. provider found    SUBJECTIVE     Chief Complaint   Patient presents with    Left Foot - Follow-up, Foot Pain     Pre op -      HPI: Radha Herr, a 64 y.o.female, presents to clinic.    Patient is here for follow-up.  The gabapentin helps with the pain at night.  Still numbness and tingling on the outside of her foot.  Past Medical History:   Diagnosis Date    Allergic Began in my 30s progressively have gotten worse    Dust, pollen, mold, grass clippings, trees    Allergic rhinitis     Asymptomatic menopausal state     Closed right ankle fracture     Clotting disorder nose bleeds    Not enough skin covering my membranes    Cough     COVID-19     Difficulty walking     only because of injury    Diverticulosis     Essential (primary) hypertension     Headache sinus/allergies    History of fusion of cervical spine     C4-5    History of medical problems 23 & 23    had varithena vein surgery    Mild intermittent asthma     allergy induced, uses an inhaler 1x day    Other dorsalgia     Overflow incontinence     Peroneus longus tendinitis     Sprain of anterior talofibular ligament of left ankle      Past Surgical History:   Procedure Laterality Date    BILATERAL BREAST REDUCTION  2017    BREAST SURGERY  2017     SECTION      COLONOSCOPY      HYSTERECTOMY      LUMBAR DISCECTOMY FUSION INSTRUMENTATION      NASAL SEPTUM SURGERY      SINUS SURGERY      Deviated septum    VARICOSE VEIN SURGERY       Family History   Problem Relation Age of Onset    Cancer Mother             COPD Mother             Hypertension Mother             Heart attack Father     Coronary artery disease Father     Hyperlipidemia Father             Hypertension Father             COPD Father              Stroke Father     Depression Father     Mental illness Father     Stroke Maternal Grandfather             Diabetes Maternal Grandmother              Social History     Socioeconomic History    Marital status:     Number of children: 2   Tobacco Use    Smoking status: Never     Passive exposure: Never    Smokeless tobacco: Never   Vaping Use    Vaping status: Never Used   Substance and Sexual Activity    Alcohol use: Not Currently    Drug use: Never    Sexual activity: Not Currently     Partners: Male     Birth control/protection: Hysterectomy     Allergies   Allergen Reactions    Statins Myalgia     Current Outpatient Medications   Medication Sig Dispense Refill    fenofibrate (Tricor) 145 MG tablet Take 1 tablet by mouth Daily. 90 tablet 1    Fluticasone Furoate-Vilanterol (BREO ELLIPTA) 200-25 MCG/ACT inhaler Inhale 1 puff by mouth Daily. 180 each 1    gabapentin (Neurontin) 300 MG capsule Take 1 capsule by mouth Daily. 30 capsule 0    losartan (Cozaar) 25 MG tablet Take 1 tablet by mouth daily. May increase to Take 2 tablets by mouth Daily if blood pressure at home is greater than 140/90 90 tablet 1     No current facility-administered medications for this visit.     Review of Systems   Constitutional: Negative.    HENT: Negative.     Eyes: Negative.    Respiratory: Negative.     Cardiovascular: Negative.    Gastrointestinal: Negative.    Endocrine: Negative.    Genitourinary: Negative.    Musculoskeletal: Negative.         Left foot pain   Skin: Negative.    Allergic/Immunologic: Negative.    Neurological: Negative.    Hematological: Negative.    Psychiatric/Behavioral: Negative.     All other systems reviewed and are negative.      OBJECTIVE     Vitals:    25 0824   BP: 147/76   Pulse: 84   SpO2: 98%       WBC   Date Value Ref Range Status   2024 5.17 3.40 - 10.80 10*3/mm3 Final     RBC   Date Value Ref Range Status   2024 4.70 3.77 - 5.28 10*6/mm3 Final      Hemoglobin   Date Value Ref Range Status   11/25/2024 13.6 12.0 - 15.9 g/dL Final     Hematocrit   Date Value Ref Range Status   11/25/2024 41.7 34.0 - 46.6 % Final     MCV   Date Value Ref Range Status   11/25/2024 88.7 79.0 - 97.0 fL Final     MCH   Date Value Ref Range Status   11/25/2024 28.9 26.6 - 33.0 pg Final     MCHC   Date Value Ref Range Status   11/25/2024 32.6 31.5 - 35.7 g/dL Final     RDW   Date Value Ref Range Status   11/25/2024 12.9 12.3 - 15.4 % Final     RDW-SD   Date Value Ref Range Status   11/25/2024 42.5 37.0 - 54.0 fl Final     MPV   Date Value Ref Range Status   11/25/2024 10.3 6.0 - 12.0 fL Final     Platelets   Date Value Ref Range Status   11/25/2024 270 140 - 450 10*3/mm3 Final     Neutrophils Absolute   Date Value Ref Range Status   07/07/2021 2.61 1.70 - 7.00 10*3/mm3 Final   07/07/2021 3.00 1.70 - 7.00 10*3/mm3 Final     Eosinophils Absolute   Date Value Ref Range Status   07/07/2021 0.09 0.00 - 0.40 10*3/mm3 Final   07/07/2021 0.23 0.00 - 0.40 10*3/mm3 Final     Basophils Absolute   Date Value Ref Range Status   07/07/2021 0.14 0.00 - 0.20 10*3/mm3 Final         Lab Results   Component Value Date    GLUCOSE 103 (H) 11/25/2024    BUN 15 11/25/2024    CREATININE 0.88 11/25/2024    EGFRIFNONA 86 07/07/2021    BCR 17.0 11/25/2024    K 4.0 11/25/2024    CO2 26.4 11/25/2024    CALCIUM 9.5 11/25/2024    ALBUMIN 3.8 11/25/2024    AST 16 11/25/2024    ALT 27 11/25/2024       Patient seen in no apparent distress.      PHYSICAL EXAM:     Foot/Ankle Exam    GENERAL  Appearance:  appears stated age  Orientation:  AAOx3  Affect:  appropriate  Gait:  unimpaired  Assistance:  independent  Right shoe gear: casual shoe  Left shoe gear: casual shoe    VASCULAR     Right Foot Vascularity   Normal vascular exam    Dorsalis pedis:  2+  Posterior tibial:  2+  Skin temperature:  warm  Edema grading:  None  CFT:  < 3 seconds  Pedal hair growth:  Present  Varicosities:  none     Left Foot Vascularity    Normal vascular exam    Dorsalis pedis:  2+  Posterior tibial:  2+  Skin temperature:  warm  Edema grading:  None  CFT:  < 3 seconds  Pedal hair growth:  Present  Varicosities:  none     NEUROLOGIC     Right Foot Neurologic   Normal sensation    Light touch sensation: normal  Vibratory sensation: normal  Hot/Cold sensation: normal  Protective Sensation using [place] Monofilament:   Sites intact: 10  Sites tested: 10     Left Foot Neurologic   Normal sensation    Light touch sensation: normal  Vibratory sensation: normal  Hot/Cold sensation:  normal  Protective Sensation using [place] Monofilament:   Sites intact: 10  Sites tested: 10    MUSCULOSKELETAL     Left Foot Musculoskeletal   Tenderness:  calcaneocuboid joint tenderness    MUSCLE STRENGTH     Right Foot Muscle Strength   Foot dorsiflexion:  4  Foot plantar flexion:  4  Foot inversion:  4  Foot eversion:  4     Left Foot Muscle Strength   Foot dorsiflexion:  4  Foot plantar flexion:  4  Foot inversion:  4  Foot eversion:  4    RANGE OF MOTION     Right Foot Range of Motion   Foot and ankle ROM within normal limits       Left Foot Range of Motion   Foot and ankle ROM within normal limits      DERMATOLOGIC      Right Foot Dermatologic   Skin  Right foot skin is intact.      Left Foot Dermatologic   Skin  Left foot skin is intact.       RADIOLOGY:        No results found.    ASSESSMENT/PLAN     Diagnoses and all orders for this visit:    1. Neuritis (Primary)  -     Case Request; Standing  -     sodium chloride 0.9 % flush 10 mL  -     sodium chloride 0.9 % flush 10 mL  -     sodium chloride 0.9 % infusion 40 mL  -     ceFAZolin (ANCEF) 3,000 mg in sodium chloride 0.9 % 100 mL IVPB  -     Case Request    2. Foot sprain, left, sequela    3. Peroneus longus tendinitis, left    Other orders  -     Follow Anesthesia Guidelines / Protocol; Future  -     Follow Anesthesia Guidelines / Protocol; Standing  -     Verify / Perform Chlorhexidine Skin  Prep; Standing  -     Provide NPO Instructions to Patient; Future  -     Chlorhexidine Skin Prep; Future  -     Insert Peripheral IV; Standing  -     Saline Lock & Maintain IV Access; Standing  -     Place Sequential Compression Device; Standing  -     Maintain Sequential Compression Device; Standing            Patient like to proceed with surgical intervention.  Discussed exploration of the tendon with possible repair, possible accessory ossicle removal, nerve decompression.  Patient is agreeable to plan.      The risks and benefits of the surgery were discussed with the patient.  This discussion included possible complications of requiring further surgery, possible delayed wound healing, further surgery requiring amputation of the foot or leg, and also included the complication of death.  All the patient's questions were answered to their satisfaction.  Patient states they would like to proceed with the procedure.    Comprehensive lower extremity examination and evaluation was performed.    Discussed findings and treatment plan including risks, benefits, and treatment options with patient in detail. Patient agreed with treatment plan.    Medications and allergies reviewed.  Reviewed available lab values along with other pertinent labs.  These were discussed with the patient.    An After Visit Summary was printed and given to the patient at discharge, including (if requested) any available informative/educational handouts regarding diagnosis, treatment, or medications. All questions were answered to patient/family satisfaction. Should symptoms fail to improve or worsen they agree to call or return to clinic or to go to the Emergency Department. Discussed the importance of following up with any needed screening tests/labs/specialist appointments and any requested follow-up recommended by me today. Importance of maintaining follow-up discussed and patient accepts that missed appointments can delay diagnosis and  potentially lead to worsening of conditions.    Return for Post op., or sooner if acute issues arise.    This document has been electronically signed by Leighton Fletcher DPM on January 16, 2025 10:57 EST

## 2025-01-16 NOTE — H&P (VIEW-ONLY)
[facility] - PODIATRY    Today's Date: 25    Patient Name: Radha Herr  MRN: [id number]  CSN: [id number]  PCP: Swetha Krishna MD,   Referring Provider: No ref. provider found    SUBJECTIVE     Chief Complaint   Patient presents with    Left Foot - Follow-up, Foot Pain     Pre op -      HPI: Radha Herr, a 64 y.o.female, presents to clinic.    Patient is here for follow-up.  The gabapentin helps with the pain at night.  Still numbness and tingling on the outside of her foot.  Past Medical History:   Diagnosis Date    Allergic Began in my 30s progressively have gotten worse    Dust, pollen, mold, grass clippings, trees    Allergic rhinitis     Asymptomatic menopausal state     Closed right ankle fracture     Clotting disorder nose bleeds    Not enough skin covering my membranes    Cough     COVID-19     Difficulty walking     only because of injury    Diverticulosis     Essential (primary) hypertension     Headache sinus/allergies    History of fusion of cervical spine     C4-5    History of medical problems 23 & 23    had varithena vein surgery    Mild intermittent asthma     allergy induced, uses an inhaler 1x day    Other dorsalgia     Overflow incontinence     Peroneus longus tendinitis     Sprain of anterior talofibular ligament of left ankle      Past Surgical History:   Procedure Laterality Date    BILATERAL BREAST REDUCTION  2017    BREAST SURGERY  2017     SECTION      COLONOSCOPY      HYSTERECTOMY      LUMBAR DISCECTOMY FUSION INSTRUMENTATION      NASAL SEPTUM SURGERY      SINUS SURGERY      Deviated septum    VARICOSE VEIN SURGERY       Family History   Problem Relation Age of Onset    Cancer Mother             COPD Mother             Hypertension Mother             Heart attack Father     Coronary artery disease Father     Hyperlipidemia Father             Hypertension Father             COPD Father              Stroke Father     Depression Father     Mental illness Father     Stroke Maternal Grandfather             Diabetes Maternal Grandmother              Social History     Socioeconomic History    Marital status:     Number of children: 2   Tobacco Use    Smoking status: Never     Passive exposure: Never    Smokeless tobacco: Never   Vaping Use    Vaping status: Never Used   Substance and Sexual Activity    Alcohol use: Not Currently    Drug use: Never    Sexual activity: Not Currently     Partners: Male     Birth control/protection: Hysterectomy     Allergies   Allergen Reactions    Statins Myalgia     Current Outpatient Medications   Medication Sig Dispense Refill    fenofibrate (Tricor) 145 MG tablet Take 1 tablet by mouth Daily. 90 tablet 1    Fluticasone Furoate-Vilanterol (BREO ELLIPTA) 200-25 MCG/ACT inhaler Inhale 1 puff by mouth Daily. 180 each 1    gabapentin (Neurontin) 300 MG capsule Take 1 capsule by mouth Daily. 30 capsule 0    losartan (Cozaar) 25 MG tablet Take 1 tablet by mouth daily. May increase to Take 2 tablets by mouth Daily if blood pressure at home is greater than 140/90 90 tablet 1     No current facility-administered medications for this visit.     Review of Systems   Constitutional: Negative.    HENT: Negative.     Eyes: Negative.    Respiratory: Negative.     Cardiovascular: Negative.    Gastrointestinal: Negative.    Endocrine: Negative.    Genitourinary: Negative.    Musculoskeletal: Negative.         Left foot pain   Skin: Negative.    Allergic/Immunologic: Negative.    Neurological: Negative.    Hematological: Negative.    Psychiatric/Behavioral: Negative.     All other systems reviewed and are negative.      OBJECTIVE     Vitals:    25 0824   BP: 147/76   Pulse: 84   SpO2: 98%       WBC   Date Value Ref Range Status   2024 5.17 3.40 - 10.80 10*3/mm3 Final     RBC   Date Value Ref Range Status   2024 4.70 3.77 - 5.28 10*6/mm3 Final      Hemoglobin   Date Value Ref Range Status   11/25/2024 13.6 12.0 - 15.9 g/dL Final     Hematocrit   Date Value Ref Range Status   11/25/2024 41.7 34.0 - 46.6 % Final     MCV   Date Value Ref Range Status   11/25/2024 88.7 79.0 - 97.0 fL Final     MCH   Date Value Ref Range Status   11/25/2024 28.9 26.6 - 33.0 pg Final     MCHC   Date Value Ref Range Status   11/25/2024 32.6 31.5 - 35.7 g/dL Final     RDW   Date Value Ref Range Status   11/25/2024 12.9 12.3 - 15.4 % Final     RDW-SD   Date Value Ref Range Status   11/25/2024 42.5 37.0 - 54.0 fl Final     MPV   Date Value Ref Range Status   11/25/2024 10.3 6.0 - 12.0 fL Final     Platelets   Date Value Ref Range Status   11/25/2024 270 140 - 450 10*3/mm3 Final     Neutrophils Absolute   Date Value Ref Range Status   07/07/2021 2.61 1.70 - 7.00 10*3/mm3 Final   07/07/2021 3.00 1.70 - 7.00 10*3/mm3 Final     Eosinophils Absolute   Date Value Ref Range Status   07/07/2021 0.09 0.00 - 0.40 10*3/mm3 Final   07/07/2021 0.23 0.00 - 0.40 10*3/mm3 Final     Basophils Absolute   Date Value Ref Range Status   07/07/2021 0.14 0.00 - 0.20 10*3/mm3 Final         Lab Results   Component Value Date    GLUCOSE 103 (H) 11/25/2024    BUN 15 11/25/2024    CREATININE 0.88 11/25/2024    EGFRIFNONA 86 07/07/2021    BCR 17.0 11/25/2024    K 4.0 11/25/2024    CO2 26.4 11/25/2024    CALCIUM 9.5 11/25/2024    ALBUMIN 3.8 11/25/2024    AST 16 11/25/2024    ALT 27 11/25/2024       Patient seen in no apparent distress.      PHYSICAL EXAM:     Foot/Ankle Exam    GENERAL  Appearance:  appears stated age  Orientation:  AAOx3  Affect:  appropriate  Gait:  unimpaired  Assistance:  independent  Right shoe gear: casual shoe  Left shoe gear: casual shoe    VASCULAR     Right Foot Vascularity   Normal vascular exam    Dorsalis pedis:  2+  Posterior tibial:  2+  Skin temperature:  warm  Edema grading:  None  CFT:  < 3 seconds  Pedal hair growth:  Present  Varicosities:  none     Left Foot Vascularity    Normal vascular exam    Dorsalis pedis:  2+  Posterior tibial:  2+  Skin temperature:  warm  Edema grading:  None  CFT:  < 3 seconds  Pedal hair growth:  Present  Varicosities:  none     NEUROLOGIC     Right Foot Neurologic   Normal sensation    Light touch sensation: normal  Vibratory sensation: normal  Hot/Cold sensation: normal  Protective Sensation using Daven[place]-George Monofilament:   Sites intact: 10  Sites tested: 10     Left Foot Neurologic   Normal sensation    Light touch sensation: normal  Vibratory sensation: normal  Hot/Cold sensation:  normal  Protective Sensation using Davenport-George Monofilament:   Sites intact: 10  Sites tested: 10    MUSCULOSKELETAL     Left Foot Musculoskeletal   Tenderness:  calcaneocuboid joint tenderness    MUSCLE STRENGTH     Right Foot Muscle Strength   Foot dorsiflexion:  4  Foot plantar flexion:  4  Foot inversion:  4  Foot eversion:  4     Left Foot Muscle Strength   Foot dorsiflexion:  4  Foot plantar flexion:  4  Foot inversion:  4  Foot eversion:  4    RANGE OF MOTION     Right Foot Range of Motion   Foot and ankle ROM within normal limits       Left Foot Range of Motion   Foot and ankle ROM within normal limits      DERMATOLOGIC      Right Foot Dermatologic   Skin  Right foot skin is intact.      Left Foot Dermatologic   Skin  Left foot skin is intact.       RADIOLOGY:        No results found.    ASSESSMENT/PLAN     Diagnoses and all orders for this visit:    1. Neuritis (Primary)  -     Case Request; Standing  -     sodium chloride 0.9 % flush 10 mL  -     sodium chloride 0.9 % flush 10 mL  -     sodium chloride 0.9 % infusion 40 mL  -     ceFAZolin (ANCEF) 3,000 mg in sodium chloride 0.9 % 100 mL IVPB  -     Case Request    2. Foot sprain, left, sequela    3. Peroneus longus tendinitis, left    Other orders  -     Follow Anesthesia Guidelines / Protocol; Future  -     Follow Anesthesia Guidelines / Protocol; Standing  -     Verify / Perform Chlorhexidine Skin  Prep; Standing  -     Provide NPO Instructions to Patient; Future  -     Chlorhexidine Skin Prep; Future  -     Insert Peripheral IV; Standing  -     Saline Lock & Maintain IV Access; Standing  -     Place Sequential Compression Device; Standing  -     Maintain Sequential Compression Device; Standing            Patient like to proceed with surgical intervention.  Discussed exploration of the tendon with possible repair, possible accessory ossicle removal, nerve decompression.  Patient is agreeable to plan.      The risks and benefits of the surgery were discussed with the patient.  This discussion included possible complications of requiring further surgery, possible delayed wound healing, further surgery requiring amputation of the foot or leg, and also included the complication of death.  All the patient's questions were answered to their satisfaction.  Patient states they would like to proceed with the procedure.    Comprehensive lower extremity examination and evaluation was performed.    Discussed findings and treatment plan including risks, benefits, and treatment options with patient in detail. Patient agreed with treatment plan.    Medications and allergies reviewed.  Reviewed available lab values along with other pertinent labs.  These were discussed with the patient.    An After Visit Summary was printed and given to the patient at discharge, including (if requested) any available informative/educational handouts regarding diagnosis, treatment, or medications. All questions were answered to patient/family satisfaction. Should symptoms fail to improve or worsen they agree to call or return to clinic or to go to the Emergency Department. Discussed the importance of following up with any needed screening tests/labs/specialist appointments and any requested follow-up recommended by me today. Importance of maintaining follow-up discussed and patient accepts that missed appointments can delay diagnosis and  potentially lead to worsening of conditions.    Return for Post op., or sooner if acute issues arise.    This document has been electronically signed by Leighton Fletcher DPM on January 16, 2025 10:57 EST

## 2025-01-26 DIAGNOSIS — M79.2 NEURITIS: ICD-10-CM

## 2025-01-27 RX ORDER — GABAPENTIN 300 MG/1
300 CAPSULE ORAL DAILY
Qty: 30 CAPSULE | Refills: 0 | Status: SHIPPED | OUTPATIENT
Start: 2025-01-27

## 2025-02-03 RX ORDER — MULTIPLE VITAMINS W/ MINERALS TAB 9MG-400MCG
1 TAB ORAL DAILY
COMMUNITY

## 2025-02-03 RX ORDER — ALBUTEROL SULFATE 90 UG/1
2 INHALANT RESPIRATORY (INHALATION) EVERY 4 HOURS PRN
COMMUNITY

## 2025-02-03 NOTE — PRE-PROCEDURE INSTRUCTIONS
PATIENT INSTRUCTED TO BE:    - NOTHING TO EAT AFTER MIDNIGHT OR CHEW, EXCEPT CAN HAVE SIPS OF WATER WITH MEDICATIONS OR CLEAR LIQUIDS 2 HOURS PRIOR TO SURGERY ARRIVAL TIME , NO MORE THAN 8 OZ. (NOTHING RED)     - TO HOLD ALL VITAMINS, SUPPLEMENTS, NSAIDS FOR ONE WEEK PRIOR TO THEIR SURGICAL PROCEDURE    - DO NOT TAKE ______________________ 7 DAYS PRIOR TO PROCEDURE PER ANESTHESIA RECOMMENDATIONS/INSTRUCTIONS     - INSTRUCTED PT TO USE SURGICAL SOAP 1 TIME THE NIGHT PRIOR TO SURGERY ___________ OR THE AM OF SURGERY _____________   USE THE SOAP FROM NECK TO TOES, AVOID THEIR FACE, HAIR, AND PRIVATE PARTS. IF USE THE SOAP THE NIGHT PRIOR TO SURGERY, CHANGE BED LINENS AND NO PETS IN THE BED.     INSTRUCTED NO LOTIONS, JEWELRY, PIERCINGS,  NAIL POLISH, OR DEODORANT DAY OF SURGERY    - IF DIABETIC, CHECK BLOOD GLUCOSE IF LESS THAN 70 OR HAVING SYMPTOMS CALL THE PREOP AREA FOR INSTRUCTIONS ON AM OF SURGERY ([phone])    -INSTRUCTED TO TAKE THE FOLLOWING MEDICATIONS THE DAY OF SURGERY WITH SIPS OF WATER:   BREO INHALER  ALBUTEROL INHALER AS NEEDED.         - DO NOT BRING ANY MEDICATIONS WITH YOU TO THE HOSPITAL THE DAY OF SURGERY, EXCEPT IF USE INHALERS. BRING INHALERS DAY OF SURGERY       - BRING CPAP OR BIPAP TO THE HOSPITAL ONLY IF YOU ARE SPENDING THE NIGHT    - DO NOT SMOKE OR VAPE 24 HOURS PRIOR TO PROCEDURE PER ANESTHESIA REQUEST     -MAKE SURE YOU HAVE A RIDE HOME OR SOMEONE TO STAY WITH YOU THE DAY OF THE PROCEDURE AFTER YOU GO HOME     - FOLLOW ANY OTHER INSTRUCTIONS GIVEN TO YOU BY YOUR SURGEON'S OFFICE.     - DAY OF SURGERY _ AT [facility]),  PARK IN THE OPEN LOT, COME TO [facility]/ [facility], FIRST FLOOR, CHECK IN AT THE DESK FOR REGISTRATION/ SURGERY      - YOU WILL RECEIVE A PHONE CALL THE DAY PRIOR TO SURGERY BETWEEN 1PM AND 4 PM WITH ARRIVAL TIME, IF YOUR SURGERY IS ON A MONDAY YOU WILL RECEIVE A CALL THE FRIDAY PRIOR TO SURGERY DATE    - BRING CASH OR CREDIT CARD FOR  COPAYMENT OF MEDICATIONS AFTER SURGERY IF YOU USE THE HOSPITAL PHARMACY (MEDS TO BED)    - PREADMISSION TESTING NURSE SWATHI DALE -[phone] IF HAVE ANY QUESTIONS     -PATIENT PROVIDED THE NUMBER FOR PREOP SURGICAL DEPT IF HAD QUESTIONS AFTER HOURS PRIOR TO SURGERY ([phone]).  INFORMED PT IF NO ANSWER, LEAVE A MESSAGE AND SOMEONE WILL RETURN THEIR CALL       PATIENT VERBALIZED UNDERSTANDING       Clear Liquid Diet        Find out when you need to start a clear liquid diet.   Think of “clear liquids” as anything you could read a newspaper through. This includes things like water, broth, sports drinks, or tea WITHOUT any kind of milk or cream.           Once you are told to start a clear liquid diet, only drink these things until 2 hours before arrival to the hospital or when the hospital says to stop. Total volume limitation: 8 oz.       Clear liquids you CAN drink:   Water   Clear broth: beef, chicken, vegetable, or bone broth with nothing in it   Gatorade   Lemonade or Ismael-aid   Soda   Tea, coffee (NO cream or honey)   Jell-O (without fruit)   Popsicles (without fruit or cream)   Italian ices   Juice without pulp: apple, white, grape   You may use salt, pepper, and sugar  NO RED  NO NOODLES    Do NOT drink:   Milk or cream   Soy milk, almond milk, coconut milk, or other non-dairy drinks and   creamers   Milkshakes or smoothies   Tomato juice   Orange juice   Grapefruit juice   Cream soups or any other than broth         Clear Liquid Diet:  Do NOT eat any solid food.  Do NOT eat or suck on mints or candy.  Do NOT chew gum.  Do NOT drink thick liquids like milk or juice with pulp in it.  Do NOT add milk, cream, or anything like soy milk or almond milk to coffee or tea.

## 2025-02-04 ENCOUNTER — ANESTHESIA (OUTPATIENT)
Dept: PERIOP | Facility: HOSPITAL | Age: 65
End: 2025-02-04
Payer: COMMERCIAL

## 2025-02-04 ENCOUNTER — ANESTHESIA EVENT (OUTPATIENT)
Dept: PERIOP | Facility: HOSPITAL | Age: 65
End: 2025-02-04
Payer: COMMERCIAL

## 2025-02-04 ENCOUNTER — HOSPITAL ENCOUNTER (OUTPATIENT)
Facility: HOSPITAL | Age: 65
Setting detail: HOSPITAL OUTPATIENT SURGERY
Discharge: HOME | End: 2025-02-04
Attending: PODIATRIST | Admitting: PODIATRIST
Payer: COMMERCIAL

## 2025-02-04 VITALS
TEMPERATURE: 97.3 F | HEART RATE: 71 BPM | SYSTOLIC BLOOD PRESSURE: 128 MMHG | DIASTOLIC BLOOD PRESSURE: 68 MMHG | RESPIRATION RATE: 14 BRPM | WEIGHT: 282.41 LBS | OXYGEN SATURATION: 97 % | BODY MASS INDEX: 47.05 KG/M2 | HEIGHT: 65 IN

## 2025-02-04 DIAGNOSIS — M79.2 NEURITIS: Primary | ICD-10-CM

## 2025-02-04 PROCEDURE — 25010000002 FENTANYL CITRATE (PF) 50 MCG/ML SOLUTION: Performed by: ANESTHESIOLOGY

## 2025-02-04 PROCEDURE — 25010000002 ONDANSETRON PER 1 MG: Performed by: ANESTHESIOLOGY

## 2025-02-04 PROCEDURE — 25010000002 BUPIVACAINE (PF) 0.5 % SOLUTION 10 ML VIAL: Performed by: PODIATRIST

## 2025-02-04 PROCEDURE — 25010000002 DEXAMETHASONE PER 1 MG: Performed by: ANESTHESIOLOGY

## 2025-02-04 PROCEDURE — 25810000003 LACTATED RINGERS PER 1000 ML: Performed by: ANESTHESIOLOGY

## 2025-02-04 PROCEDURE — 25010000002 CEFAZOLIN 3 G RECONSTITUTED SOLUTION 1 EACH VIAL: Performed by: PODIATRIST

## 2025-02-04 PROCEDURE — 25010000002 PROPOFOL 10 MG/ML EMULSION: Performed by: ANESTHESIOLOGY

## 2025-02-04 PROCEDURE — 25010000002 LIDOCAINE PF 2% 2 % SOLUTION: Performed by: ANESTHESIOLOGY

## 2025-02-04 PROCEDURE — 25010000002 SUGAMMADEX 200 MG/2ML SOLUTION: Performed by: ANESTHESIOLOGY

## 2025-02-04 PROCEDURE — 25010000002 LIDOCAINE 1 % SOLUTION 10 ML VIAL: Performed by: PODIATRIST

## 2025-02-04 PROCEDURE — 25010000002 MIDAZOLAM PER 1MG: Performed by: ANESTHESIOLOGY

## 2025-02-04 RX ORDER — SODIUM CHLORIDE, SODIUM LACTATE, POTASSIUM CHLORIDE, CALCIUM CHLORIDE 600; 310; 30; 20 MG/100ML; MG/100ML; MG/100ML; MG/100ML
9 INJECTION, SOLUTION INTRAVENOUS CONTINUOUS PRN
Status: DISCONTINUED | OUTPATIENT
Start: 2025-02-04 | End: 2025-02-04 | Stop reason: HOSPADM

## 2025-02-04 RX ORDER — OXYCODONE HYDROCHLORIDE 5 MG/1
5 TABLET ORAL
Status: DISCONTINUED | OUTPATIENT
Start: 2025-02-04 | End: 2025-02-04 | Stop reason: HOSPADM

## 2025-02-04 RX ORDER — PROMETHAZINE HYDROCHLORIDE 12.5 MG/1
25 TABLET ORAL ONCE AS NEEDED
Status: DISCONTINUED | OUTPATIENT
Start: 2025-02-04 | End: 2025-02-04 | Stop reason: HOSPADM

## 2025-02-04 RX ORDER — LIDOCAINE HYDROCHLORIDE 20 MG/ML
INJECTION, SOLUTION EPIDURAL; INFILTRATION; INTRACAUDAL; PERINEURAL AS NEEDED
Status: DISCONTINUED | OUTPATIENT
Start: 2025-02-04 | End: 2025-02-04 | Stop reason: SURG

## 2025-02-04 RX ORDER — KETAMINE HYDROCHLORIDE 10 MG/ML
INJECTION, SOLUTION INTRAMUSCULAR; INTRAVENOUS AS NEEDED
Status: DISCONTINUED | OUTPATIENT
Start: 2025-02-04 | End: 2025-02-04 | Stop reason: SURG

## 2025-02-04 RX ORDER — ONDANSETRON 2 MG/ML
INJECTION INTRAMUSCULAR; INTRAVENOUS AS NEEDED
Status: DISCONTINUED | OUTPATIENT
Start: 2025-02-04 | End: 2025-02-04 | Stop reason: SURG

## 2025-02-04 RX ORDER — PROPOFOL 10 MG/ML
VIAL (ML) INTRAVENOUS AS NEEDED
Status: DISCONTINUED | OUTPATIENT
Start: 2025-02-04 | End: 2025-02-04 | Stop reason: SURG

## 2025-02-04 RX ORDER — ACETAMINOPHEN 500 MG
1000 TABLET ORAL ONCE
Status: DISCONTINUED | OUTPATIENT
Start: 2025-02-04 | End: 2025-02-04

## 2025-02-04 RX ORDER — DEXAMETHASONE SODIUM PHOSPHATE 4 MG/ML
INJECTION, SOLUTION INTRA-ARTICULAR; INTRALESIONAL; INTRAMUSCULAR; INTRAVENOUS; SOFT TISSUE AS NEEDED
Status: DISCONTINUED | OUTPATIENT
Start: 2025-02-04 | End: 2025-02-04 | Stop reason: SURG

## 2025-02-04 RX ORDER — PHENYLEPHRINE HCL IN 0.9% NACL 1 MG/10 ML
SYRINGE (ML) INTRAVENOUS AS NEEDED
Status: DISCONTINUED | OUTPATIENT
Start: 2025-02-04 | End: 2025-02-04 | Stop reason: SURG

## 2025-02-04 RX ORDER — FENTANYL CITRATE 50 UG/ML
INJECTION, SOLUTION INTRAMUSCULAR; INTRAVENOUS AS NEEDED
Status: DISCONTINUED | OUTPATIENT
Start: 2025-02-04 | End: 2025-02-04 | Stop reason: SURG

## 2025-02-04 RX ORDER — ONDANSETRON 2 MG/ML
4 INJECTION INTRAMUSCULAR; INTRAVENOUS ONCE AS NEEDED
Status: DISCONTINUED | OUTPATIENT
Start: 2025-02-04 | End: 2025-02-04 | Stop reason: HOSPADM

## 2025-02-04 RX ORDER — ROCURONIUM BROMIDE 10 MG/ML
INJECTION, SOLUTION INTRAVENOUS AS NEEDED
Status: DISCONTINUED | OUTPATIENT
Start: 2025-02-04 | End: 2025-02-04 | Stop reason: SURG

## 2025-02-04 RX ORDER — SCOPOLAMINE 1 MG/3D
1 PATCH, EXTENDED RELEASE TRANSDERMAL ONCE
Status: DISCONTINUED | OUTPATIENT
Start: 2025-02-04 | End: 2025-02-04 | Stop reason: HOSPADM

## 2025-02-04 RX ORDER — MIDAZOLAM HYDROCHLORIDE 2 MG/2ML
2 INJECTION, SOLUTION INTRAMUSCULAR; INTRAVENOUS ONCE
Status: COMPLETED | OUTPATIENT
Start: 2025-02-04 | End: 2025-02-04

## 2025-02-04 RX ORDER — EPHEDRINE SULFATE 50 MG/ML
INJECTION, SOLUTION INTRAVENOUS AS NEEDED
Status: DISCONTINUED | OUTPATIENT
Start: 2025-02-04 | End: 2025-02-04 | Stop reason: SURG

## 2025-02-04 RX ORDER — SODIUM CHLORIDE 0.9 % (FLUSH) 0.9 %
10 SYRINGE (ML) INJECTION EVERY 12 HOURS SCHEDULED
Status: DISCONTINUED | OUTPATIENT
Start: 2025-02-04 | End: 2025-02-04 | Stop reason: HOSPADM

## 2025-02-04 RX ORDER — PROMETHAZINE HYDROCHLORIDE 25 MG/1
25 SUPPOSITORY RECTAL ONCE AS NEEDED
Status: DISCONTINUED | OUTPATIENT
Start: 2025-02-04 | End: 2025-02-04 | Stop reason: HOSPADM

## 2025-02-04 RX ORDER — MAGNESIUM HYDROXIDE 1200 MG/15ML
LIQUID ORAL AS NEEDED
Status: DISCONTINUED | OUTPATIENT
Start: 2025-02-04 | End: 2025-02-04 | Stop reason: HOSPADM

## 2025-02-04 RX ORDER — SODIUM CHLORIDE 9 MG/ML
40 INJECTION, SOLUTION INTRAVENOUS AS NEEDED
Status: DISCONTINUED | OUTPATIENT
Start: 2025-02-04 | End: 2025-02-04 | Stop reason: HOSPADM

## 2025-02-04 RX ORDER — PROMETHAZINE HYDROCHLORIDE 25 MG/1
25 TABLET ORAL EVERY 6 HOURS PRN
Qty: 20 TABLET | Refills: 0 | Status: SHIPPED | OUTPATIENT
Start: 2025-02-04

## 2025-02-04 RX ORDER — HYDROCODONE BITARTRATE AND ACETAMINOPHEN 7.5; 325 MG/1; MG/1
1 TABLET ORAL EVERY 6 HOURS PRN
Qty: 30 TABLET | Refills: 0 | Status: SHIPPED | OUTPATIENT
Start: 2025-02-04

## 2025-02-04 RX ORDER — SODIUM CHLORIDE 0.9 % (FLUSH) 0.9 %
10 SYRINGE (ML) INJECTION AS NEEDED
Status: DISCONTINUED | OUTPATIENT
Start: 2025-02-04 | End: 2025-02-04 | Stop reason: HOSPADM

## 2025-02-04 RX ADMIN — EPHEDRINE SULFATE 10 MG: 50 INJECTION INTRAVENOUS at 09:50

## 2025-02-04 RX ADMIN — FENTANYL CITRATE 50 MCG: 50 INJECTION, SOLUTION INTRAMUSCULAR; INTRAVENOUS at 09:21

## 2025-02-04 RX ADMIN — EPHEDRINE SULFATE 10 MG: 50 INJECTION INTRAVENOUS at 10:02

## 2025-02-04 RX ADMIN — SUGAMMADEX 200 MG: 100 INJECTION, SOLUTION INTRAVENOUS at 10:08

## 2025-02-04 RX ADMIN — SODIUM CHLORIDE, POTASSIUM CHLORIDE, SODIUM LACTATE AND CALCIUM CHLORIDE 9 ML/HR: 600; 310; 30; 20 INJECTION, SOLUTION INTRAVENOUS at 08:08

## 2025-02-04 RX ADMIN — Medication 100 MCG: at 09:34

## 2025-02-04 RX ADMIN — MIDAZOLAM HYDROCHLORIDE 2 MG: 1 INJECTION, SOLUTION INTRAMUSCULAR; INTRAVENOUS at 09:02

## 2025-02-04 RX ADMIN — ROCURONIUM BROMIDE 50 MG: 50 INJECTION INTRAVENOUS at 09:21

## 2025-02-04 RX ADMIN — PROPOFOL 150 MG: 10 INJECTION, EMULSION INTRAVENOUS at 09:21

## 2025-02-04 RX ADMIN — FENTANYL CITRATE 25 MCG: 50 INJECTION, SOLUTION INTRAMUSCULAR; INTRAVENOUS at 09:39

## 2025-02-04 RX ADMIN — PROPOFOL 200 MCG/KG/MIN: 10 INJECTION, EMULSION INTRAVENOUS at 09:25

## 2025-02-04 RX ADMIN — SCOLOPAMINE TRANSDERMAL SYSTEM 1 PATCH: 1 PATCH, EXTENDED RELEASE TRANSDERMAL at 08:08

## 2025-02-04 RX ADMIN — KETAMINE HYDROCHLORIDE 30 MG: 10 INJECTION INTRAMUSCULAR; INTRAVENOUS at 09:25

## 2025-02-04 RX ADMIN — KETAMINE HYDROCHLORIDE 20 MG: 10 INJECTION INTRAMUSCULAR; INTRAVENOUS at 09:21

## 2025-02-04 RX ADMIN — SODIUM CHLORIDE 2000 MG: 9 INJECTION, SOLUTION INTRAVENOUS at 09:41

## 2025-02-04 RX ADMIN — LIDOCAINE HYDROCHLORIDE 100 MG: 20 INJECTION, SOLUTION EPIDURAL; INFILTRATION; INTRACAUDAL; PERINEURAL at 09:21

## 2025-02-04 RX ADMIN — Medication 100 MCG: at 09:49

## 2025-02-04 RX ADMIN — DEXAMETHASONE SODIUM PHOSPHATE 4 MG: 4 INJECTION, SOLUTION INTRAMUSCULAR; INTRAVENOUS at 09:36

## 2025-02-04 RX ADMIN — Medication 100 MCG: at 10:00

## 2025-02-04 RX ADMIN — ONDANSETRON 4 MG: 2 INJECTION INTRAMUSCULAR; INTRAVENOUS at 10:04

## 2025-02-04 RX ADMIN — Medication 100 MCG: at 09:44

## 2025-02-04 RX ADMIN — FENTANYL CITRATE 25 MCG: 50 INJECTION, SOLUTION INTRAMUSCULAR; INTRAVENOUS at 10:03

## 2025-02-04 NOTE — ANESTHESIA POSTPROCEDURE EVALUATION
Patient: Radha Herr    Procedure Summary       Date: 02/04/25 Room / Location: [facility] OR  / [facility]    Anesthesia Start: 0915 Anesthesia Stop: 1024    Procedure: left foot nerve cecompression, tendon repair left foot (Left) Diagnosis:       Neuritis      Sural neuritis      Tendon tear      (Neuritis [M79.2])    Surgeons: Leighton Fletcher DPM Provider: Kristi Warren MD    Anesthesia Type: general, MAC ASA Status: 3            Anesthesia Type: general, MAC    Vitals  Vitals Value Taken Time   /60 02/04/25 1106   Temp 36.1 °C (96.9 °F) 02/04/25 1020   Pulse 70 02/04/25 1109   Resp 14 02/04/25 1020   SpO2 96 % 02/04/25 1109   Vitals shown include unfiled device data.        Post Anesthesia Care and Evaluation    Patient location during evaluation: bedside  Patient participation: complete - patient participated  Level of consciousness: awake  Pain management: adequate    Airway patency: patent  PONV Status: none  Cardiovascular status: acceptable and stable  Respiratory status: acceptable  Hydration status: acceptable

## 2025-02-04 NOTE — ANESTHESIA PREPROCEDURE EVALUATION
Anesthesia Evaluation     Patient summary reviewed and Nursing notes reviewed   history of anesthetic complications:  PONV  NPO Solid Status: > 8 hours  NPO Liquid Status: > 2 hours           Airway   Mallampati: I  TM distance: >3 FB  Neck ROM: full  No difficulty expected  Dental - normal exam     Pulmonary - normal exam    breath sounds clear to auscultation  (+) COPD (breo and albuterol used this morning), asthma,  Cardiovascular - normal exam  Exercise tolerance: good (4-7 METS)    ECG reviewed  Rhythm: regular  Rate: normal    (+) hypertension (losartan), hyperlipidemia    ROS comment: EKG: NSR 75 bpm    Neuro/Psych  (+) headaches  GI/Hepatic/Renal/Endo    (+) morbid obesity    Musculoskeletal (-) negative ROS    Abdominal   (+) obese   Substance History - negative use     OB/GYN negative ob/gyn ROS         Other - negative ROS       ROS/Med Hx Other: PAT Nursing Notes unavailable.                     Anesthesia Plan    ASA 3     general and MAC     (Patient understands anesthesia not responsible for dental damage.)  intravenous induction     Anesthetic plan, risks, benefits, and alternatives have been provided, discussed and informed consent has been obtained with: patient.    Use of blood products discussed with patient .    Plan discussed with CRNA.        CODE STATUS:

## 2025-02-04 NOTE — OP NOTE
Operative Note   Foot and Ankle Surgery   Provider: Dr. Leighton Fletcher DPM  Location: [facility]    Patient Name:  Radha Herr  YOB: 1960    Date of Surgery:  2/4/2025    Pre-op Diagnosis:   Sural neuritis, Peroneal tendon tear.       Post-Op Diagnosis Codes:     * Neuritis [M79.2]     * Sural neuritis [G57.80]     * Tendon tear [T14.8XXA]    Procedure/CPT® Codes:    Procedure(s):  left foot nerve decompression, tendon repair left foot    Staff:  Surgeon(s):  Leighton Fletcher DPM    Assistant: Rain Sumner RN CSA  was responsible for performing the following activities: Retraction, Suction, and Irrigation and their skilled assistance was necessary for the success of this case.    Anesthesia: Choice    Estimated Blood Loss: less than 5 ml    Implants:    Nothing was implanted during the procedure    Specimen:          None        Complications: none    Description of Procedure:     Procedure, risks, complications, and goals were discussed with the patient at bedside.  Risks include but are not limited to infection, complications from anesthesia (including death), chronic pain or numbness, hematoma/seroma, deep vein thrombosis, wound complications, and potential for additional surgical procedures.  Patient understands and elects to proceed with surgery at this time. Informed consent was obtained before proceeding to the operating suite.  All questions were answered to the patient's satisfaction. No guarantees or assurances were given or implied.    Left foot was prepped and draped in usual sterile manner.  Attention was directed to the lateral aspect of the foot at the distal aspect of the peroneal tendons.  Incision was made deepened through subcutaneous tissue down to the nerve and neurovascular structures.  The sural nerve was identified.  The nerve was noted to be minute and skinny.  Multiple varicose veins were noted within the area.  The wounds were cauterized as  needed.  All fascia was released from the nerve.  The nerve was noted to be adequately decompressed on releasing the structures.  Attention was then directed to the peroneal tendons where they were inspected.  The brevis tendon was noted to have a split tear.  This was read tubularized using 3-0 and 4-0 Vicryl.  All areas were flushed with copious amounts saline.  Subcutaneous tissue was closed with 3-0 and 4-0 Vicryl and skin was closing 3-0 nylon.  Patient was dressed in a dry sterile dressing.  Proper hyperemic response was noted on deflation of tourniquet.  Patient tolerated anesthesia and procedure well.    Leighton Fletcher DPM  [facility]   [phone]    Leighton Fletcher DPM     Date: 2/4/2025  Time: 10:20 EST

## 2025-02-04 NOTE — DISCHARGE INSTRUCTIONS
DISCHARGE INSTRUCTIONS  PODIATRY SURGERY       For your surgery you had:  General anesthesia (you may have a sore throat for the first 24 hours)  Local anesthesia  You may experience dizziness, drowsiness, or light-headedness for several hours following surgery  Do not stay alone today or tonight.  Limit your activity for 24 hours.  Resume your diet slowly.  Follow whatever special dietary instructions you may have been given by the doctor.  You should not drive or operate machinery or drink alcohol for 24 hours or while you are taking pain medication.You should not sign any legally binding documents.  DO NOT TOUCH DRESSING.  You may shower: KEEP DRESSING DRY.  Sleep with the injured part elevated on a pillow.  Follow verbal instructions of your doctor.  Sit with the lower leg propped up on a footstool or chair with pillows.    SPECIAL INSTRUCTIONS:      Last dose of pain medication was given at:    Ice bag to injured area for 72 hours.  Apply 20 minutes on - 20 minutes off.  Never place ice directly on skin or cast.     Bend knee and rotate ankle for 5 minutes every hour to support circulation.  DO NOT REMOVE DRESSING. KEEP DRESSING DRY. You may try to bathe in a tub, while keeping foot out of tub.  Small amount of bleeding through bandage may occur and is normal, unless it becomes heavy or persists, call office in this case.  Eat well balanced diet high in protein and vitamin c, may take supplemental vitamins and calcium. Drink plenty of fluids and get plenty of rest with foot elevated.  Use crutches, walker or cane for ambulation.  You are non weight bearing. No driving until released by MD.      NOTIFY THE PHYSICIAN IF YOU EXPERIENCE:  Numbness of toes.  Inability to move toes.  Extreme coldness, paleness or blue dis-coloration of toes.  Excessive swelling of affected surgical site or swelling that causes the cast to rub or cut into skin.  Pain unrelieved by pain medication  Nausea/vomiting not relieved by  prescribed medication  Unable to urinate in 6 hours after surgery  Temperature greater than 101 degree Fahrenheit or chills  If unable to reach your doctor, please go to the closest emergency room

## 2025-02-10 ENCOUNTER — TELEPHONE (OUTPATIENT)
Dept: PODIATRY | Facility: CLINIC | Age: 65
End: 2025-02-10
Payer: COMMERCIAL

## 2025-02-10 NOTE — TELEPHONE ENCOUNTER
Per Dr Fletcher, ice and elevation, numbness going up to the hip is probable not related to the surgery. Patient states she elevating with 2 pillows. Advised just to try one pillow and advised she can re wrap the ace bandage to see if that will give some relief. Patient thanked me for the call.

## 2025-02-20 ENCOUNTER — OFFICE VISIT (OUTPATIENT)
Dept: PODIATRY | Facility: CLINIC | Age: 65
End: 2025-02-20
Payer: COMMERCIAL

## 2025-02-20 VITALS
BODY MASS INDEX: 46.98 KG/M2 | HEART RATE: 79 BPM | OXYGEN SATURATION: 95 % | SYSTOLIC BLOOD PRESSURE: 150 MMHG | DIASTOLIC BLOOD PRESSURE: 80 MMHG | HEIGHT: 65 IN | WEIGHT: 282 LBS

## 2025-02-20 DIAGNOSIS — M79.2 NEURITIS: ICD-10-CM

## 2025-02-20 PROCEDURE — 99024 POSTOP FOLLOW-UP VISIT: CPT | Performed by: PODIATRIST

## 2025-02-20 RX ORDER — CEPHALEXIN 500 MG/1
500 CAPSULE ORAL 3 TIMES DAILY
Qty: 30 CAPSULE | Refills: 0 | Status: SHIPPED | OUTPATIENT
Start: 2025-02-20 | End: 2025-03-02

## 2025-02-20 RX ORDER — HYDROCODONE BITARTRATE AND ACETAMINOPHEN 7.5; 325 MG/1; MG/1
1 TABLET ORAL EVERY 6 HOURS PRN
Qty: 30 TABLET | Refills: 0 | Status: SHIPPED | OUTPATIENT
Start: 2025-02-20

## 2025-02-20 RX ORDER — GABAPENTIN 300 MG/1
300 CAPSULE ORAL DAILY
Qty: 90 CAPSULE | Refills: 0 | Status: SHIPPED | OUTPATIENT
Start: 2025-02-20 | End: 2025-05-21

## 2025-02-21 NOTE — PROGRESS NOTES
[facility] - PODIATRY    Today's Date: 25    Patient Name: Radha Herr  MRN: [id number]  CSN: [id number]  PCP: Swetha Krishna MD,   Referring Provider: No ref. provider found    SUBJECTIVE     Chief Complaint   Patient presents with    Left Foot - Post-op Follow-up     Tendon repair and neuritis     HPI: Radha Herr, a 64 y.o.female, presents to clinic.    Patient is here for follow-up.  The gabapentin helps with the pain at night.  Still numbness and tingling on the outside of her foot.  Past Medical History:   Diagnosis Date    Allergic Began in my 30s progressively have gotten worse    Dust, pollen, mold, grass clippings, trees    Allergic rhinitis     Asymptomatic menopausal state     Closed right ankle fracture     Clotting disorder nose bleeds    Not enough skin covering my membranes, NO MEDS    COVID-19     Difficulty walking     only because of injury    Diverticulosis     Essential (primary) hypertension     Headache sinus/allergies    History of fusion of cervical spine     C4-5 FULL EXTENSION    History of medical problems 23 & 23    had varithena vein surgery    Mild intermittent asthma     allergy induced, uses an inhaler 1x day    Other dorsalgia     Overflow incontinence     Peroneus longus tendinitis     PONV (postoperative nausea and vomiting)     Sprain of anterior talofibular ligament of left ankle      Past Surgical History:   Procedure Laterality Date    BILATERAL BREAST REDUCTION  2017    CERVICAL FUSION      C4/5     SECTION      X2    COLONOSCOPY      CYST REMOVAL Left 2025    Procedure: left foot nerve cecompression, tendon repair left foot;  Surgeon: Leighton Fletcher DPM;  [place] [facility] [place] [facility] [place]  Service: [place]iatry;  Laterality: Left;    HYSTERECTOMY      NASAL SEPTUM SURGERY      VARICOSE VEIN SURGERY       Family History   Problem Relation Age of Onset    Cancer Mother             COPD Mother              Hypertension Mother             Heart attack Father     Coronary artery disease Father     Hyperlipidemia Father             Hypertension Father             COPD Father             Stroke Father     Depression Father     Mental illness Father     Diabetes Maternal Grandmother             Stroke Maternal Grandfather             Malig Hyperthermia Neg Hx      Social History     Socioeconomic History    Marital status:     Number of children: 2   Tobacco Use    Smoking status: Never     Passive exposure: Never    Smokeless tobacco: Never   Vaping Use    Vaping status: Never Used   Substance and Sexual Activity    Alcohol use: Not Currently    Drug use: Never    Sexual activity: Not Currently     Partners: Male     Birth control/protection: Hysterectomy     Allergies   Allergen Reactions    Acetaminophen-Codeine Other (See Comments)     GIVES ME NIGHTMARES AND HARD TO WAKE UP    Tylenol [Acetaminophen] Other (See Comments)     GIVES ME NIGHTMARES AND HARD TO WAKE UP    Statins Myalgia     Current Outpatient Medications   Medication Sig Dispense Refill    albuterol sulfate  (90 Base) MCG/ACT inhaler Inhale 2 puffs Every 4 (Four) Hours As Needed for Wheezing.      fenofibrate (Tricor) 145 MG tablet Take 1 tablet by mouth Daily. (Patient taking differently: Take 1 tablet by mouth Every Night.) 90 tablet 1    Fluticasone Furoate-Vilanterol (BREO ELLIPTA) 200-25 MCG/ACT inhaler Inhale 1 puff by mouth Daily. 180 each 1    gabapentin (Neurontin) 300 MG capsule Take 1 capsule by mouth Daily 90 capsule 0    HYDROcodone-acetaminophen (Norco) 7.5-325 MG per tablet Take 1 tablet by mouth Every 6 (Six) Hours As Needed for Moderate Pain. 30 tablet 0    losartan (Cozaar) 25 MG tablet Take 1 tablet by mouth daily. May increase to Take 2 tablets by mouth Daily if blood pressure at home is greater than 140/90 90 tablet 1    multivitamin with minerals tablet tablet Take 1  tablet by mouth Daily.      promethazine (PHENERGAN) 25 MG tablet Take 1 tablet by mouth Every 6 (Six) Hours As Needed for Nausea or Vomiting. 20 tablet 0    cephalexin (Keflex) 500 MG capsule Take 1 capsule by mouth 3 (Three) Times a Day for 10 days. 30 capsule 0     No current facility-administered medications for this visit.     Review of Systems   Constitutional: Negative.    HENT: Negative.     Eyes: Negative.    Respiratory: Negative.     Cardiovascular: Negative.    Gastrointestinal: Negative.    Endocrine: Negative.    Genitourinary: Negative.    Musculoskeletal: Negative.         Left foot pain   Skin: Negative.    Allergic/Immunologic: Negative.    Neurological: Negative.    Hematological: Negative.    Psychiatric/Behavioral: Negative.     All other systems reviewed and are negative.      OBJECTIVE     Vitals:    02/20/25 1010   BP: 150/80   Pulse: 79   SpO2: 95%       WBC   Date Value Ref Range Status   11/25/2024 5.17 3.40 - 10.80 10*3/mm3 Final     RBC   Date Value Ref Range Status   11/25/2024 4.70 3.77 - 5.28 10*6/mm3 Final     Hemoglobin   Date Value Ref Range Status   11/25/2024 13.6 12.0 - 15.9 g/dL Final     Hematocrit   Date Value Ref Range Status   11/25/2024 41.7 34.0 - 46.6 % Final     MCV   Date Value Ref Range Status   11/25/2024 88.7 79.0 - 97.0 fL Final     MCH   Date Value Ref Range Status   11/25/2024 28.9 26.6 - 33.0 pg Final     MCHC   Date Value Ref Range Status   11/25/2024 32.6 31.5 - 35.7 g/dL Final     RDW   Date Value Ref Range Status   11/25/2024 12.9 12.3 - 15.4 % Final     RDW-SD   Date Value Ref Range Status   11/25/2024 42.5 37.0 - 54.0 fl Final     MPV   Date Value Ref Range Status   11/25/2024 10.3 6.0 - 12.0 fL Final     Platelets   Date Value Ref Range Status   11/25/2024 270 140 - 450 10*3/mm3 Final     Neutrophils Absolute   Date Value Ref Range Status   07/07/2021 2.61 1.70 - 7.00 10*3/mm3 Final   07/07/2021 3.00 1.70 - 7.00 10*3/mm3 Final     Eosinophils Absolute    Date Value Ref Range Status   07/07/2021 0.09 0.00 - 0.40 10*3/mm3 Final   07/07/2021 0.23 0.00 - 0.40 10*3/mm3 Final     Basophils Absolute   Date Value Ref Range Status   07/07/2021 0.14 0.00 - 0.20 10*3/mm3 Final         Lab Results   Component Value Date    GLUCOSE 103 (H) 11/25/2024    BUN 15 11/25/2024    CREATININE 0.88 11/25/2024    EGFRIFNONA 86 07/07/2021    BCR 17.0 11/25/2024    K 4.0 11/25/2024    CO2 26.4 11/25/2024    CALCIUM 9.5 11/25/2024    ALBUMIN 3.8 11/25/2024    AST 16 11/25/2024    ALT 27 11/25/2024       Patient seen in no apparent distress.      PHYSICAL EXAM:     Foot/Ankle Exam    GENERAL  Appearance:  appears stated age  Orientation:  AAOx3  Affect:  appropriate  Gait:  unimpaired  Assistance:  independent  Right shoe gear: casual shoe  Left shoe gear: casual shoe    VASCULAR     Right Foot Vascularity   Normal vascular exam    Dorsalis pedis:  2+  Posterior tibial:  2+  Skin temperature:  warm  Edema grading:  None  CFT:  < 3 seconds  Pedal hair growth:  Present  Varicosities:  none     Left Foot Vascularity   Normal vascular exam    Dorsalis pedis:  2+  Posterior tibial:  2+  Skin temperature:  warm  Edema grading:  None  CFT:  < 3 seconds  Pedal hair growth:  Present  Varicosities:  none     NEUROLOGIC     Right Foot Neurologic   Normal sensation    Light touch sensation: normal  Vibratory sensation: normal  Hot/Cold sensation: normal  Protective Sensation using [place] Monofilament:   Sites intact: 10  Sites tested: 10     Left Foot Neurologic   Normal sensation    Light touch sensation: normal  Vibratory sensation: normal  Hot/Cold sensation:  normal  Protective Sensation using [place] Monofilament:   Sites intact: 10  Sites tested: 10    MUSCLE STRENGTH     Right Foot Muscle Strength   Foot dorsiflexion:  4  Foot plantar flexion:  4  Foot inversion:  4  Foot eversion:  4     Left Foot Muscle Strength   Foot dorsiflexion:  4  Foot plantar flexion:  4  Foot  inversion:  4  Foot eversion:  4    RANGE OF MOTION     Right Foot Range of Motion   Foot and ankle ROM within normal limits       Left Foot Range of Motion   Foot and ankle ROM within normal limits      DERMATOLOGIC      Right Foot Dermatologic   Skin  Right foot skin is intact.      Left Foot Dermatologic   Skin  Left foot skin is intact.      Left foot additional comments: Incision coapted. No acute signs of infection. Calf non tender.      RADIOLOGY:        No results found.    ASSESSMENT/PLAN     Diagnoses and all orders for this visit:    1. Neuritis  -     gabapentin (Neurontin) 300 MG capsule; Take 1 capsule by mouth Daily  Dispense: 90 capsule; Refill: 0  -     HYDROcodone-acetaminophen (Norco) 7.5-325 MG per tablet; Take 1 tablet by mouth Every 6 (Six) Hours As Needed for Moderate Pain.  Dispense: 30 tablet; Refill: 0    Other orders  -     cephalexin (Keflex) 500 MG capsule; Take 1 capsule by mouth 3 (Three) Times a Day for 10 days.  Dispense: 30 capsule; Refill: 0        Weight bearing as tolerated in surgical shoe, okay to transition to normal shoe gear if tolerated. RTC in 2-4 weeks.     Blister to incision likely secondary to rubbing in boot. Antibiotics prescribed.     Comprehensive lower extremity examination and evaluation was performed.    Discussed findings and treatment plan including risks, benefits, and treatment options with patient in detail. Patient agreed with treatment plan.    Medications and allergies reviewed.  Reviewed available lab values along with other pertinent labs.  These were discussed with the patient.    An After Visit Summary was printed and given to the patient at discharge, including (if requested) any available informative/educational handouts regarding diagnosis, treatment, or medications. All questions were answered to patient/family satisfaction. Should symptoms fail to improve or worsen they agree to call or return to clinic or to go to the Emergency Department.  Discussed the importance of following up with any needed screening tests/labs/specialist appointments and any requested follow-up recommended by me today. Importance of maintaining follow-up discussed and patient accepts that missed appointments can delay diagnosis and potentially lead to worsening of conditions.    Return in about 1 month (around 3/20/2025)., or sooner if acute issues arise.    This document has been electronically signed by Leighton Fletcher DPM on February 21, 2025 07:21 EST

## 2025-02-24 ENCOUNTER — TELEPHONE (OUTPATIENT)
Dept: PODIATRY | Facility: CLINIC | Age: 65
End: 2025-02-24

## 2025-02-24 ENCOUNTER — TELEPHONE (OUTPATIENT)
Dept: FAMILY MEDICINE CLINIC | Age: 65
End: 2025-02-24
Payer: COMMERCIAL

## 2025-02-24 DIAGNOSIS — E78.2 MIXED HYPERLIPIDEMIA: Primary | ICD-10-CM

## 2025-02-24 NOTE — TELEPHONE ENCOUNTER
"Provider: [place]    Caller: Radha Herr \"Anaya\"    Relationship to Patient: Self    Pharmacy:     Phone Number: [phone]    Reason for Call: PT CALLED AND STATES THAT SHE STILL HAVE A STITCH IN HER INCISION THAT IS CAUSING SOME DISCOMFORT, WOULD LIKE TO KNOW IF SHE CAN COME IN TODAY OR TOMORROW TO HAVE IT REMOVED  "

## 2025-02-24 NOTE — TELEPHONE ENCOUNTER
"Caller: Radha Herr \"Anaya\"    Relationship: Self    Best call back number: [phone]    What orders are you requesting (i.e. lab or imaging): CHOLESTEROL RECHECK     In what timeframe would the patient need to come in: 02/24/2025    Where will you receive your lab/imaging services: IN OFFICE     Additional notes: PATIENT WOULD LIKE CALL WHEN ORDER IS PLACED.   "

## 2025-02-25 ENCOUNTER — LAB (OUTPATIENT)
Dept: LAB | Facility: HOSPITAL | Age: 65
End: 2025-02-25
Payer: COMMERCIAL

## 2025-02-25 DIAGNOSIS — Z11.59 ENCOUNTER FOR SCREENING FOR OTHER VIRAL DISEASES: ICD-10-CM

## 2025-02-25 DIAGNOSIS — E78.2 MIXED HYPERLIPIDEMIA: ICD-10-CM

## 2025-02-25 LAB
ALBUMIN SERPL-MCNC: 4.4 G/DL (ref 3.5–5.2)
ALBUMIN/GLOB SERPL: 1.5 G/DL
ALP SERPL-CCNC: 59 U/L (ref 39–117)
ALT SERPL W P-5'-P-CCNC: 41 U/L (ref 1–33)
ANION GAP SERPL CALCULATED.3IONS-SCNC: 9.7 MMOL/L (ref 5–15)
AST SERPL-CCNC: 27 U/L (ref 1–32)
BILIRUB SERPL-MCNC: 0.4 MG/DL (ref 0–1.2)
BUN SERPL-MCNC: 19 MG/DL (ref 8–23)
BUN/CREAT SERPL: 26 (ref 7–25)
CALCIUM SPEC-SCNC: 9.6 MG/DL (ref 8.6–10.5)
CHLORIDE SERPL-SCNC: 105 MMOL/L (ref 98–107)
CHOLEST SERPL-MCNC: 210 MG/DL (ref 0–200)
CO2 SERPL-SCNC: 26.3 MMOL/L (ref 22–29)
CREAT SERPL-MCNC: 0.73 MG/DL (ref 0.57–1)
EGFRCR SERPLBLD CKD-EPI 2021: 92.5 ML/MIN/1.73
GLOBULIN UR ELPH-MCNC: 3 GM/DL
GLUCOSE SERPL-MCNC: 106 MG/DL (ref 65–99)
HCV AB SER QL: NORMAL
HDLC SERPL-MCNC: 63 MG/DL (ref 40–60)
LDLC SERPL CALC-MCNC: 127 MG/DL (ref 0–100)
LDLC/HDLC SERPL: 1.98 {RATIO}
POTASSIUM SERPL-SCNC: 4.4 MMOL/L (ref 3.5–5.2)
PROT SERPL-MCNC: 7.4 G/DL (ref 6–8.5)
SODIUM SERPL-SCNC: 141 MMOL/L (ref 136–145)
TRIGL SERPL-MCNC: 112 MG/DL (ref 0–150)
VLDLC SERPL-MCNC: 20 MG/DL (ref 5–40)

## 2025-02-25 PROCEDURE — 80053 COMPREHEN METABOLIC PANEL: CPT

## 2025-02-25 PROCEDURE — 86803 HEPATITIS C AB TEST: CPT

## 2025-02-25 PROCEDURE — 80061 LIPID PANEL: CPT

## 2025-02-25 PROCEDURE — 36415 COLL VENOUS BLD VENIPUNCTURE: CPT

## 2025-02-27 ENCOUNTER — OFFICE VISIT (OUTPATIENT)
Dept: PODIATRY | Facility: CLINIC | Age: 65
End: 2025-02-27
Payer: COMMERCIAL

## 2025-02-27 VITALS
OXYGEN SATURATION: 95 % | HEIGHT: 65 IN | BODY MASS INDEX: 46.98 KG/M2 | HEART RATE: 93 BPM | DIASTOLIC BLOOD PRESSURE: 68 MMHG | WEIGHT: 282 LBS | SYSTOLIC BLOOD PRESSURE: 137 MMHG

## 2025-02-27 DIAGNOSIS — M79.2 NEURITIS: Primary | ICD-10-CM

## 2025-02-27 DIAGNOSIS — M76.72 PERONEUS LONGUS TENDINITIS, LEFT: ICD-10-CM

## 2025-02-27 PROCEDURE — 99024 POSTOP FOLLOW-UP VISIT: CPT | Performed by: PODIATRIST

## 2025-02-28 NOTE — PROGRESS NOTES
[facility] - PODIATRY    Today's Date: 25    Patient Name: Radha Herr  MRN: [id number]  CSN: [id number]  PCP: Swetha Krishna MD,   Referring Provider: No ref. provider found    SUBJECTIVE     Chief Complaint   Patient presents with    Left Foot - Post-op Follow-up     HPI: Radha Herr, a 64 y.o.female, presents to clinic.    Patient is feeling much better and the pain is improved significantly in her left foot since surgery.  Past Medical History:   Diagnosis Date    Allergic Began in my 30s progressively have gotten worse    Dust, pollen, mold, grass clippings, trees    Allergic rhinitis     Asymptomatic menopausal state     Closed right ankle fracture     Clotting disorder nose bleeds    Not enough skin covering my membranes, NO MEDS    COVID-19     Difficulty walking     only because of injury    Diverticulosis     Essential (primary) hypertension     Headache sinus/allergies    History of fusion of cervical spine     C4-5 FULL EXTENSION    History of medical problems 23 & 23    had varithena vein surgery    Mild intermittent asthma     allergy induced, uses an inhaler 1x day    Other dorsalgia     Overflow incontinence     Peroneus longus tendinitis     PONV (postoperative nausea and vomiting)     Sprain of anterior talofibular ligament of left ankle      Past Surgical History:   Procedure Laterality Date    BILATERAL BREAST REDUCTION  2017    CERVICAL FUSION      C4/5     SECTION      X2    COLONOSCOPY      CYST REMOVAL Left 2025    Procedure: left foot nerve cecompression, tendon repair left foot;  Surgeon: Leighton Fletcher DPM;  Location: [facility] [place] [facility] [place]  Service: Podiatry;  Laterality: Left;    HYSTERECTOMY      NASAL SEPTUM SURGERY      VARICOSE VEIN SURGERY       Family History   Problem Relation Age of Onset    Cancer Mother             COPD Mother             Hypertension Mother             Heart attack Father     Coronary  artery disease Father     Hyperlipidemia Father             Hypertension Father             COPD Father             Stroke Father     Depression Father     Mental illness Father     Diabetes Maternal Grandmother             Stroke Maternal Grandfather             Malig Hyperthermia Neg Hx      Social History     Socioeconomic History    Marital status:     Number of children: 2   Tobacco Use    Smoking status: Never     Passive exposure: Never    Smokeless tobacco: Never   Vaping Use    Vaping status: Never Used   Substance and Sexual Activity    Alcohol use: Not Currently    Drug use: Never    Sexual activity: Not Currently     Partners: Male     Birth control/protection: Hysterectomy     Allergies   Allergen Reactions    Acetaminophen-Codeine Other (See Comments)     GIVES ME NIGHTMARES AND HARD TO WAKE UP    Tylenol [Acetaminophen] Other (See Comments)     GIVES ME NIGHTMARES AND HARD TO WAKE UP    Statins Myalgia     Current Outpatient Medications   Medication Sig Dispense Refill    albuterol sulfate  (90 Base) MCG/ACT inhaler Inhale 2 puffs Every 4 (Four) Hours As Needed for Wheezing.      cephalexin (Keflex) 500 MG capsule Take 1 capsule by mouth 3 (Three) Times a Day for 10 days. 30 capsule 0    fenofibrate (Tricor) 145 MG tablet Take 1 tablet by mouth Daily. (Patient taking differently: Take 1 tablet by mouth Every Night.) 90 tablet 1    Fluticasone Furoate-Vilanterol (BREO ELLIPTA) 200-25 MCG/ACT inhaler Inhale 1 puff by mouth Daily. 180 each 1    gabapentin (Neurontin) 300 MG capsule Take 1 capsule by mouth Daily 90 capsule 0    HYDROcodone-acetaminophen (Norco) 7.5-325 MG per tablet Take 1 tablet by mouth Every 6 (Six) Hours As Needed for Moderate Pain. 30 tablet 0    losartan (Cozaar) 25 MG tablet Take 1 tablet by mouth daily. May increase to Take 2 tablets by mouth Daily if blood pressure at home is greater than 140/90 90 tablet 1    multivitamin  with minerals tablet tablet Take 1 tablet by mouth Daily.      promethazine (PHENERGAN) 25 MG tablet Take 1 tablet by mouth Every 6 (Six) Hours As Needed for Nausea or Vomiting. 20 tablet 0     No current facility-administered medications for this visit.     Review of Systems   Constitutional: Negative.    HENT: Negative.     Eyes: Negative.    Respiratory: Negative.     Cardiovascular: Negative.    Gastrointestinal: Negative.    Endocrine: Negative.    Genitourinary: Negative.    Musculoskeletal: Negative.         Left foot pain   Skin: Negative.    Allergic/Immunologic: Negative.    Neurological: Negative.    Hematological: Negative.    Psychiatric/Behavioral: Negative.     All other systems reviewed and are negative.      OBJECTIVE     Vitals:    02/27/25 0833   BP: 137/68   Pulse: 93   SpO2: 95%       WBC   Date Value Ref Range Status   11/25/2024 5.17 3.40 - 10.80 10*3/mm3 Final     RBC   Date Value Ref Range Status   11/25/2024 4.70 3.77 - 5.28 10*6/mm3 Final     Hemoglobin   Date Value Ref Range Status   11/25/2024 13.6 12.0 - 15.9 g/dL Final     Hematocrit   Date Value Ref Range Status   11/25/2024 41.7 34.0 - 46.6 % Final     MCV   Date Value Ref Range Status   11/25/2024 88.7 79.0 - 97.0 fL Final     MCH   Date Value Ref Range Status   11/25/2024 28.9 26.6 - 33.0 pg Final     MCHC   Date Value Ref Range Status   11/25/2024 32.6 31.5 - 35.7 g/dL Final     RDW   Date Value Ref Range Status   11/25/2024 12.9 12.3 - 15.4 % Final     RDW-SD   Date Value Ref Range Status   11/25/2024 42.5 37.0 - 54.0 fl Final     MPV   Date Value Ref Range Status   11/25/2024 10.3 6.0 - 12.0 fL Final     Platelets   Date Value Ref Range Status   11/25/2024 270 140 - 450 10*3/mm3 Final     Neutrophils Absolute   Date Value Ref Range Status   07/07/2021 2.61 1.70 - 7.00 10*3/mm3 Final   07/07/2021 3.00 1.70 - 7.00 10*3/mm3 Final     Eosinophils Absolute   Date Value Ref Range Status   07/07/2021 0.09 0.00 - 0.40 10*3/mm3 Final    07/07/2021 0.23 0.00 - 0.40 10*3/mm3 Final     Basophils Absolute   Date Value Ref Range Status   07/07/2021 0.14 0.00 - 0.20 10*3/mm3 Final         Lab Results   Component Value Date    GLUCOSE 106 (H) 02/25/2025    BUN 19 02/25/2025    CREATININE 0.73 02/25/2025    EGFRIFNONA 86 07/07/2021    BCR 26.0 (H) 02/25/2025    K 4.4 02/25/2025    CO2 26.3 02/25/2025    CALCIUM 9.6 02/25/2025    ALBUMIN 4.4 02/25/2025    AST 27 02/25/2025    ALT 41 (H) 02/25/2025       Patient seen in no apparent distress.      PHYSICAL EXAM:     Foot/Ankle Exam    GENERAL  Appearance:  appears stated age  Orientation:  AAOx3  Affect:  appropriate  Gait:  unimpaired  Assistance:  independent  Right shoe gear: casual shoe  Left shoe gear: casual shoe    VASCULAR     Right Foot Vascularity   Normal vascular exam    Dorsalis pedis:  2+  Posterior tibial:  2+  Skin temperature:  warm  Edema grading:  None  CFT:  < 3 seconds  Pedal hair growth:  Present  Varicosities:  none     Left Foot Vascularity   Normal vascular exam    Dorsalis pedis:  2+  Posterior tibial:  2+  Skin temperature:  warm  Edema grading:  None  CFT:  < 3 seconds  Pedal hair growth:  Present  Varicosities:  none     NEUROLOGIC     Right Foot Neurologic   Normal sensation    Light touch sensation: normal  Vibratory sensation: normal  Hot/Cold sensation: normal  Protective Sensation using [place] Monofilament:   Sites intact: 10  Sites tested: 10     Left Foot Neurologic   Normal sensation    Light touch sensation: normal  Vibratory sensation: normal  Hot/Cold sensation:  normal  Protective Sensation using [place] Monofilament:   Sites intact: 10  Sites tested: 10    MUSCLE STRENGTH     Right Foot Muscle Strength   Foot dorsiflexion:  4  Foot plantar flexion:  4  Foot inversion:  4  Foot eversion:  4     Left Foot Muscle Strength   Foot dorsiflexion:  4  Foot plantar flexion:  4  Foot inversion:  4  Foot eversion:  4    RANGE OF MOTION     Right Foot Range  of Motion   Foot and ankle ROM within normal limits       Left Foot Range of Motion   Foot and ankle ROM within normal limits      DERMATOLOGIC      Right Foot Dermatologic   Skin  Right foot skin is intact.      Left Foot Dermatologic   Skin  Left foot skin is intact.      Left foot additional comments: Incision coapted. No acute signs of infection. Calf non tender.      RADIOLOGY:        No results found.    ASSESSMENT/PLAN     Diagnoses and all orders for this visit:    1. Neuritis (Primary)    2. Peroneus longus tendinitis, left        She can transition to normal shoe gear in the next 1 to 2 weeks.  Return to clinic in 1 month.    Patient to begin stretching exercises and icing in the evening as tolerated. Discussed compression therapy and resting the extremity.  Anti-inflammatory medication to begin taking if okay by PCP.    Comprehensive lower extremity examination and evaluation was performed.    Discussed findings and treatment plan including risks, benefits, and treatment options with patient in detail. Patient agreed with treatment plan.    Medications and allergies reviewed.  Reviewed available lab values along with other pertinent labs.  These were discussed with the patient.    An After Visit Summary was printed and given to the patient at discharge, including (if requested) any available informative/educational handouts regarding diagnosis, treatment, or medications. All questions were answered to patient/family satisfaction. Should symptoms fail to improve or worsen they agree to call or return to clinic or to go to the Emergency Department. Discussed the importance of following up with any needed screening tests/labs/specialist appointments and any requested follow-up recommended by me today. Importance of maintaining follow-up discussed and patient accepts that missed appointments can delay diagnosis and potentially lead to worsening of conditions.    Return in about 1 month (around 3/27/2025)., or  sooner if acute issues arise.    This document has been electronically signed by Leighton Fletcher DPM on February 28, 2025 06:51 EST

## 2025-03-03 ENCOUNTER — TELEMEDICINE (OUTPATIENT)
Dept: FAMILY MEDICINE CLINIC | Age: 65
End: 2025-03-03
Payer: COMMERCIAL

## 2025-03-03 VITALS
BODY MASS INDEX: 46.98 KG/M2 | SYSTOLIC BLOOD PRESSURE: 117 MMHG | HEIGHT: 65 IN | WEIGHT: 282 LBS | DIASTOLIC BLOOD PRESSURE: 72 MMHG

## 2025-03-03 DIAGNOSIS — J45.20 MILD INTERMITTENT ASTHMA WITHOUT COMPLICATION: ICD-10-CM

## 2025-03-03 DIAGNOSIS — I10 PRIMARY HYPERTENSION: ICD-10-CM

## 2025-03-03 DIAGNOSIS — E78.2 MIXED HYPERLIPIDEMIA: Primary | ICD-10-CM

## 2025-03-03 DIAGNOSIS — M79.672 LEFT FOOT PAIN: ICD-10-CM

## 2025-03-03 PROCEDURE — 99214 OFFICE O/P EST MOD 30 MIN: CPT | Performed by: FAMILY MEDICINE

## 2025-03-03 NOTE — ASSESSMENT & PLAN NOTE
Cholesterol is improving with Tricor 145 mg nightly and she tolerates medication well.  I counseled her on low-cholesterol diet and have included a handout in her office visit today  Orders:    Comprehensive Metabolic Panel; Future    CBC (No Diff); Future    Lipid Panel; Future

## 2025-03-03 NOTE — PROGRESS NOTES
Radha Herr presents to [facility].  Patient is being seen, via telehealth, and pt is not in the office.  Mychart or doximetry was used for this visit. Radha and I were able to hear each other simultaneously in real time. I introduced myself and verified Radha identity. I explained how the telemedicine visit will occur. Radha is aware they may terminate the telemedicine encounter and withdraw his/her consent for receiving care via telemedicine without affecting their ability to receive future care from us, and that I may also terminate the telemedicine encounter if I determine that an in-person visit is more appropriate for the condition[s] for which treatment is sought.  Radha verbally gave consent for the use of telemedicine in her care. Radha is also aware that this visit will be charged as a regular OV which may require a copay      Chief Complaint:  f/u s/p foot surgery with Dr Fletcher    Subjective     History of Present Illness:  Hypertension  Pertinent negatives include no blurred vision, chest pain, palpitations or shortness of breath.       F/u s/p foot surgery 2/4/25 left foot nerve decompression, tendon repair left foot with Dr Fletcher  1. Neuritis (Primary)  2. Peroneus longus tendinitis, left   She is overall doing well, on NSAID and gabapentin and tolerates well.  He rerouted a vein and repaired the peroneus longus tendon.  She is still having trouble walking      She presents with asthma, stable on  breo and tries to wean herself off this med and is unable due to worsening shortness of air. She also suffers from chronic cough, daily, and she does has chronic allergies, she is on xyzal her allergies are not any better, she has ongoing runny nose, stuffy nose, PND and sinus pressure.  She has tried and failed flonase (nose bleeds)/singulair/allegra/claritan in past     She presents with chronic hypertension and her home BP are normal, she is on losartan.   "Tolerates med well.  Had to stop HCTZ because it made her heart races on this medication    H/O chronic bladder leakage and incontinence, tried kegel exercises without improvement, she is s/p 2 C sections and hysterectomy and oophrectomy in past.  Urine is clear and no odor      She presents with chronic hypercholesterolemia, started on fenofibrate and it caused body aches so she stopped this med, her cholesterol is much worse at 210, cont low cholesterol diet and fenofibrate, she tolerates well        Result Review   The following data was reviewed by Swetha Krishna MD on 03/03/2025.  Lab Results   Component Value Date    WBC 5.17 11/25/2024    HGB 13.6 11/25/2024    HCT 41.7 11/25/2024    MCV 88.7 11/25/2024     11/25/2024     Lab Results   Component Value Date    GLUCOSE 106 (H) 02/25/2025    BUN 19 02/25/2025    CREATININE 0.73 02/25/2025     02/25/2025    K 4.4 02/25/2025     02/25/2025    CALCIUM 9.6 02/25/2025    PROTEINTOT 7.4 02/25/2025    ALBUMIN 4.4 02/25/2025    ALT 41 (H) 02/25/2025    AST 27 02/25/2025    ALKPHOS 59 02/25/2025    BILITOT 0.4 02/25/2025    GLOB 3.0 02/25/2025    AGRATIO 1.5 02/25/2025    BCR 26.0 (H) 02/25/2025    ANIONGAP 9.7 02/25/2025    EGFR 92.5 02/25/2025     Lab Results   Component Value Date    CHOL 210 (H) 02/25/2025    TRIG 112 02/25/2025    HDL 63 (H) 02/25/2025     (H) 02/25/2025     Lab Results   Component Value Date    TSH 1.520 07/07/2021     No results found for: \"HGBA1C\"  No results found for: \"PSA\"  No results found for: \"IRON\"   Lab Results   Component Value Date    QICW54BH 42.2 10/20/2021               Assessment and Plan:   Assessment & Plan  Mixed hyperlipidemia     Cholesterol is improving with Tricor 145 mg nightly and she tolerates medication well.  I counseled her on low-cholesterol diet and have included a handout in her office visit today  Orders:    Comprehensive Metabolic Panel; Future    CBC (No Diff); Future    Lipid Panel; " Future    Mild intermittent asthma without complication  Asthma is stable.  The patient is experiencing no daytime asthma symptoms and she is experiencing no nighttime asthma symptoms.    Plan: Continue same medication/s without change.    Discussed medication dosage, use, side effects, and goals of treatment in detail.    Discussed distinction between quick-relief and maintenance control medications.  Discussed monitoring symptoms and use of quick-relief medications and contacting provider early in the course of exacerbations.  Warning signs of respiratory distress were reviewed with the patient.     Patient Treatment Goals: symptom prevention, minimizing limitation in activity, prevention of exacerbations and use of ER/inpatient care, maintenance of optimal pulmonary function, and minimization of adverse effects of treatment.    Followup in 6 months.                 Primary hypertension  Hypertension is stable and controlled, losartan was increased to 50 mg daily but she is continued the 25 mg daily dose because her blood pressures have been stable and well-controlled with home BP checks on the lower 25 mg daily dose.  She should avoid sodium in her diet  Continue current treatment regimen.  Blood pressure will be reassessed in 6 months.         Left foot pain  Status post left foot surgery with Dr. Dickerson February 4, 2025 and she is overall doing well.  She is able to ambulate again and bear weight on that foot.  Her pain is significantly improved                  Objective     Medications:  Current Outpatient Medications   Medication Instructions    albuterol sulfate  (90 Base) MCG/ACT inhaler 2 puffs, Every 4 Hours PRN    fenofibrate (TRICOR) 145 mg, Oral, Daily    Fluticasone Furoate-Vilanterol (BREO ELLIPTA) 200-25 MCG/ACT inhaler Inhale 1 puff by mouth Daily.    gabapentin (Neurontin) 300 MG capsule Take 1 capsule by mouth Daily    HYDROcodone-acetaminophen (Norco) 7.5-325 MG per tablet 1 tablet,  "Oral, Every 6 Hours PRN    losartan (Cozaar) 25 MG tablet Take 1 tablet by mouth daily. May increase to Take 2 tablets by mouth Daily if blood pressure at home is greater than 140/90    multivitamin with minerals tablet tablet 1 tablet, Daily    promethazine (PHENERGAN) 25 mg, Oral, Every 6 Hours PRN        Vital Signs:   /72   Ht 165.1 cm (65\")   Wt 128 kg (282 lb)   BMI 46.93 kg/m²           BP Readings from Last 3 Encounters:   03/03/25 117/72   02/27/25 137/68   02/20/25 150/80      Wt Readings from Last 3 Encounters:   03/03/25 128 kg (282 lb)   02/27/25 128 kg (282 lb)   02/20/25 128 kg (282 lb)        Physical Exam:  Physical Exam  Vitals reviewed.   Constitutional:       General: She is not in acute distress.     Appearance: Normal appearance. She is normal weight. She is not ill-appearing.   HENT:      Head: Normocephalic and atraumatic.   Eyes:      Extraocular Movements: Extraocular movements intact.      Pupils: Pupils are equal, round, and reactive to light.   Pulmonary:      Effort: Pulmonary effort is normal.   Neurological:      General: No focal deficit present.      Mental Status: She is alert and oriented to person, place, and time.   Psychiatric:         Mood and Affect: Mood normal.         Behavior: Behavior normal.         Thought Content: Thought content normal.         Judgment: Judgment normal.           Review of Systems:  Review of Systems   Constitutional:  Negative for chills, fatigue and fever.   HENT:  Negative for ear pain, sinus pressure and sore throat.    Eyes:  Negative for blurred vision and double vision.   Respiratory:  Negative for cough, shortness of breath and wheezing.    Cardiovascular:  Negative for chest pain, palpitations and leg swelling.   Gastrointestinal:  Negative for abdominal pain, blood in stool, constipation, diarrhea, nausea and vomiting.   Musculoskeletal:  Positive for arthralgias.   Skin:  Negative for rash.   Neurological:  Negative for dizziness " and headache.   Psychiatric/Behavioral:  Negative for sleep disturbance, suicidal ideas and depressed mood. The patient is not nervous/anxious.               Follow Up   No follow-ups on file.    Part of this note may be an electronic transcription/translation of spoken language to printed   text using the Dragon Dictation System.              Health Maintenance   Topic Date Due    Pneumococcal Vaccine 50+ (1 of 2 - PCV) Never done    TDAP/TD VACCINES (1 - Tdap) Never done    ZOSTER VACCINE (1 of 2) Never done    COLORECTAL CANCER SCREENING  2024    COVID-19 Vaccine (3 - - season) 2024    INFLUENZA VACCINE  2025 (Originally 2024)    BMI FOLLOWUP  2025    MAMMOGRAM  2025    ANNUAL PHYSICAL  2025    LIPID PANEL  2026    HEPATITIS C SCREENING  Completed          Medical History:  There are no discontinued medications.   Past Medical History:    Allergic    Dust, pollen, mold, grass clippings, trees    Allergic rhinitis    Asymptomatic menopausal state    Closed right ankle fracture    Clotting disorder    Not enough skin covering my membranes, NO MEDS    COVID-19    Difficulty walking    only because of injury    Diverticulosis    Essential (primary) hypertension    Headache    History of fusion of cervical spine    C4-5 FULL EXTENSION    History of medical problems    had varithena vein surgery    Mild intermittent asthma    allergy induced, uses an inhaler 1x day    Other dorsalgia    Overflow incontinence    Peroneus longus tendinitis    PONV (postoperative nausea and vomiting)    Sprain of anterior talofibular ligament of left ankle     Past Surgical History:    BILATERAL BREAST REDUCTION    CERVICAL FUSION    C4/5     SECTION    X2    COLONOSCOPY    CYST REMOVAL    Procedure: left foot nerve cecompression, tendon repair left foot;  Surgeon: Leighton Fletcher DPM;  Location: [facility];  Service: Podiatry;  Laterality: Left;    HYSTERECTOMY    NASAL  SEPTUM SURGERY    VARICOSE VEIN SURGERY      Family History   Problem Relation Age of Onset    Cancer Mother             COPD Mother             Hypertension Mother             Heart attack Father     Coronary artery disease Father     Hyperlipidemia Father             Hypertension Father             COPD Father             Stroke Father     Depression Father     Mental illness Father     Diabetes Maternal Grandmother             Stroke Maternal Grandfather             Malig Hyperthermia Neg Hx      Social History     Tobacco Use    Smoking status: Never     Passive exposure: Never    Smokeless tobacco: Never   Substance Use Topics    Alcohol use: Not Currently       Health Maintenance Due   Topic Date Due    Pneumococcal Vaccine 50+ (1 of 2 - PCV) Never done    TDAP/TD VACCINES (1 - Tdap) Never done    ZOSTER VACCINE (1 of 2) Never done    COLORECTAL CANCER SCREENING  2024    COVID-19 Vaccine (3 - 2024- season) 2024        Immunization History   Administered Date(s) Administered    COVID-19 (PFIZER) Purple Cap Monovalent 06/15/2021, 2021       Allergies   Allergen Reactions    Acetaminophen-Codeine Other (See Comments)     GIVES ME NIGHTMARES AND HARD TO WAKE UP    Tylenol [Acetaminophen] Other (See Comments)     GIVES ME NIGHTMARES AND HARD TO WAKE UP    Statins Myalgia

## 2025-03-03 NOTE — ASSESSMENT & PLAN NOTE
Hypertension is stable and controlled, losartan was increased to 50 mg daily but she is continued the 25 mg daily dose because her blood pressures have been stable and well-controlled with home BP checks on the lower 25 mg daily dose.  She should avoid sodium in her diet  Continue current treatment regimen.  Blood pressure will be reassessed in 6 months.

## 2025-03-03 NOTE — PATIENT INSTRUCTIONS
"High Cholesterol    High cholesterol is a condition in which the blood has high levels of a white, waxy substance similar to fat (cholesterol). The liver makes all the cholesterol that the body needs. The human body needs small amounts of cholesterol to help build cells. A person gets extra or excess cholesterol from the food that he or she eats.  The blood carries cholesterol from the liver to the rest of the body. If you have high cholesterol, deposits (plaques) may build up on the walls of your arteries. Arteries are the blood vessels that carry blood away from your heart. These plaques make the arteries narrow and stiff.  Cholesterol plaques increase your risk for heart attack and stroke. Work with your health care provider to keep your cholesterol levels in a healthy range.  What increases the risk?  The following factors may make you more likely to develop this condition:  Eating foods that are high in animal fat (saturated fat) or cholesterol.  Being overweight.  Not getting enough exercise.  A family history of high cholesterol (familial hypercholesterolemia).  Use of tobacco products.  Having diabetes.  What are the signs or symptoms?  In most cases, high cholesterol does not usually cause any symptoms.  In severe cases, very high cholesterol levels can cause:  Fatty bumps under the skin (xanthomas).  A white or gray ring around the black center (pupil) of the eye.  How is this diagnosed?  This condition may be diagnosed based on the results of a blood test.  If you are older than 20 years of age, your health care provider may check your cholesterol levels every 4-6 years.  You may be checked more often if you have high cholesterol or other risk factors for heart disease.  The blood test for cholesterol measures:  \"Bad\" cholesterol, or LDL cholesterol. This is the main type of cholesterol that causes heart disease. The desired level is less than 100 mg/dL (2.59 mmol/L).  \"Good\" cholesterol, or HDL " cholesterol. HDL helps protect against heart disease by cleaning the arteries and carrying the LDL to the liver for processing. The desired level for HDL is 60 mg/dL (1.55 mmol/L) or higher.  Triglycerides. These are fats that your body can store or burn for energy. The desired level is less than 150 mg/dL (1.69 mmol/L).  Total cholesterol. This measures the total amount of cholesterol in your blood and includes LDL, HDL, and triglycerides. The desired level is less than 200 mg/dL (5.17 mmol/L).  How is this treated?  Treatment for high cholesterol starts with lifestyle changes, such as diet and exercise.  Diet changes. You may be asked to eat foods that have more fiber and less saturated fats or added sugar.  Lifestyle changes. These may include regular exercise, maintaining a healthy weight, and quitting use of tobacco products.  Medicines. These are given when diet and lifestyle changes have not worked. You may be prescribed a statin medicine to help lower your cholesterol levels.  Follow these instructions at home:  Eating and drinking    Eat a healthy, balanced diet. This diet includes:  Daily servings of a variety of fresh, frozen, or canned fruits and vegetables.  Daily servings of whole grain foods that are rich in fiber.  Foods that are low in saturated fats and trans fats. These include poultry and fish without skin, lean cuts of meat, and low-fat dairy products.  A variety of fish, especially oily fish that contain omega-3 fatty acids. Aim to eat fish at least 2 times a week.  Avoid foods and drinks that have added sugar.  Use healthy cooking methods, such as roasting, grilling, broiling, baking, poaching, steaming, and stir-frying. Do not cheatham your food except for stir-frying.  If you drink alcohol:  Limit how much you have to:  0-1 drink a day for women who are not pregnant.  0-2 drinks a day for men.  Know how much alcohol is in a drink. In the [place] one drink equals one 12 oz bottle of beer (355 mL),  "one 5 oz glass of wine (148 mL), or one 1½ oz glass of hard liquor (44 mL).  Lifestyle    Get regular exercise. Aim to exercise for a total of 150 minutes a week. Increase your activity level by doing activities such as gardening, walking, and taking the stairs.  Do not use any products that contain nicotine or tobacco. These products include cigarettes, chewing tobacco, and vaping devices, such as e-cigarettes. If you need help quitting, ask your health care provider.  General instructions  Take over-the-counter and prescription medicines only as told by your health care provider.  Keep all follow-up visits. This is important.  Where to find more information  American Heart Association: www.heart.org  National Heart, Lung, and Blood [place]: www.nhlbi.nih.gov  Contact a health care provider if:  You have trouble achieving or maintaining a healthy diet or weight.  You are starting an exercise program.  You are unable to stop smoking.  Get help right away if:  You have chest pain.  You have trouble breathing.  You have discomfort or pain in your jaw, neck, back, shoulder, or arm.  You have any symptoms of a stroke. \"BE FAST\" is an easy way to remember the main warning signs of a stroke:  B - Balance. Signs are dizziness, sudden trouble walking, or loss of balance.  E - Eyes. Signs are trouble seeing or a sudden change in vision.  F - Face. Signs are sudden weakness or numbness of the face, or the face or eyelid drooping on one side.  A - Arms. Signs are weakness or numbness in an arm. This happens suddenly and usually on one side of the body.  S - Speech. Signs are sudden trouble speaking, slurred speech, or trouble understanding what people say.  T - Time. Time to call emergency services. Write down what time symptoms started.  You have other signs of a stroke, such as:  A sudden, severe headache with no known cause.  Nausea or vomiting.  Seizure.  These symptoms may represent a serious problem that is an " emergency. Do not wait to see if the symptoms will go away. Get medical help right away. Call your local emergency services (911 in the [place] Do not drive yourself to the hospital.  Summary  Cholesterol plaques increase your risk for heart attack and stroke. Work with your health care provider to keep your cholesterol levels in a healthy range.  Eat a healthy, balanced diet, get regular exercise, and maintain a healthy weight.  Do not use any products that contain nicotine or tobacco. These products include cigarettes, chewing tobacco, and vaping devices, such as e-cigarettes.  Get help right away if you have any symptoms of a stroke.  This information is not intended to replace advice given to you by your health care provider. Make sure you discuss any questions you have with your health care provider.  Document Revised: 07/21/2023 Document Reviewed: 02/21/2022  [employer] Patient Education © 2024 [employer]

## 2025-03-10 ENCOUNTER — PATIENT MESSAGE (OUTPATIENT)
Dept: FAMILY MEDICINE CLINIC | Age: 65
End: 2025-03-10
Payer: COMMERCIAL

## 2025-04-02 NOTE — TELEPHONE ENCOUNTER
PROGRESS NOTE    SUBJECTIVE:   Nayana Pagan Pt inf

## 2025-05-02 ENCOUNTER — OFFICE VISIT (OUTPATIENT)
Dept: PODIATRY | Facility: CLINIC | Age: 65
End: 2025-05-02
Payer: COMMERCIAL

## 2025-05-02 VITALS
HEART RATE: 67 BPM | HEIGHT: 65 IN | WEIGHT: 278 LBS | BODY MASS INDEX: 46.32 KG/M2 | OXYGEN SATURATION: 96 % | SYSTOLIC BLOOD PRESSURE: 169 MMHG | DIASTOLIC BLOOD PRESSURE: 82 MMHG

## 2025-05-02 DIAGNOSIS — M79.2 NEURITIS: Primary | ICD-10-CM

## 2025-05-02 DIAGNOSIS — M76.72 PERONEUS LONGUS TENDINITIS, LEFT: ICD-10-CM

## 2025-05-02 PROCEDURE — 99024 POSTOP FOLLOW-UP VISIT: CPT | Performed by: PODIATRIST

## 2025-05-02 NOTE — PROGRESS NOTES
[facility] - PODIATRY    Today's Date: 25    Patient Name: Radha Herr  MRN: [id number]  CSN: [id number]  PCP: Swetha Krishna MD,   Referring Provider: No ref. provider found    SUBJECTIVE     Chief Complaint   Patient presents with    Left Foot - Post-op Follow-up     Doing better   Tingling in toes / jolts in foot but not as frequent      HPI: Radha Herr, a 64 y.o.female, presents to clinic.    States she is overall significantly improved since prior to the surgery.  Patient that she gets occasional jolts and tingling to her toes but it is becoming less frequent.  She has not been taking her gabapentin due to her symptoms being resolved.  Past Medical History:   Diagnosis Date    Allergic Began in my 30s progressively have gotten worse    Dust, pollen, mold, grass clippings, trees    Allergic rhinitis     Arthritis     Shoulders    Asymptomatic menopausal state     Closed right ankle fracture     Clotting disorder nose bleeds    Not enough skin covering my membranes, NO MEDS    COVID-19     Difficulty walking     only because of injury    Diverticulosis     Essential (primary) hypertension     Headache sinus/allergies    History of fusion of cervical spine     C4-5 FULL EXTENSION    History of medical problems 23 & 23    had varithena vein surgery    Injury of neck     I had a neck injury on  where I hernated a disk and had a fusion    Mild intermittent asthma     allergy induced, uses an inhaler 1x day    Other dorsalgia     Overflow incontinence     Peroneus longus tendinitis     Pneumonia     Covid    PONV (postoperative nausea and vomiting)     Sprain of anterior talofibular ligament of left ankle      Past Surgical History:   Procedure Laterality Date    BILATERAL BREAST REDUCTION  2017    BREAST SURGERY  2017    CERVICAL FUSION      C4/5     SECTION      X2    COLONOSCOPY      CYST REMOVAL Left 2025    Procedure: left foot nerve cecompression,  tendon repair left foot;  Surgeon: Leighton Fletcher DPM;  Location: [facility] OR [facility] [place];  Service: Podiatry;  Laterality: Left;    FOOT SURGERY  [id number]    Ankle tendon nerve repair vein rerouted removal of scar tissue    FRACTURE SURGERY  2023    Left ankle surgery    HYSTERECTOMY      NASAL SEPTUM SURGERY      SINUS SURGERY      Deviated septum    VARICOSE VEIN SURGERY       Family History   Problem Relation Age of Onset    Cancer Mother             COPD Mother             Hypertension Mother             Heart attack Father     Coronary artery disease Father     Hyperlipidemia Father             Hypertension Father             COPD Father             Stroke Father     Depression Father     Mental illness Father     Diabetes Maternal Grandmother             Stroke Maternal Grandfather             Malig Hyperthermia Neg Hx      Social History     Socioeconomic History    Marital status:     Number of children: 2   Tobacco Use    Smoking status: Never     Passive exposure: Never    Smokeless tobacco: Never   Vaping Use    Vaping status: Never Used   Substance and Sexual Activity    Alcohol use: Not Currently    Drug use: Never    Sexual activity: Not Currently     Partners: Male     Birth control/protection: Hysterectomy     Allergies   Allergen Reactions    Acetaminophen-Codeine Other (See Comments)     GIVES ME NIGHTMARES AND HARD TO WAKE UP    Tylenol [Acetaminophen] Other (See Comments)     GIVES ME NIGHTMARES AND HARD TO WAKE UP    Statins Myalgia     Current Outpatient Medications   Medication Sig Dispense Refill    albuterol sulfate  (90 Base) MCG/ACT inhaler Inhale 2 puffs Every 4 (Four) Hours As Needed for Wheezing.      fenofibrate (Tricor) 145 MG tablet Take 1 tablet by mouth Daily. (Patient taking differently: Take 1 tablet by mouth Every Night.) 90 tablet 1    Fluticasone Furoate-Vilanterol (BREO ELLIPTA) 200-25 MCG/ACT  inhaler Inhale 1 puff by mouth Daily. 180 each 1    gabapentin (Neurontin) 300 MG capsule Take 1 capsule by mouth Daily 90 capsule 0    HYDROcodone-acetaminophen (Norco) 7.5-325 MG per tablet Take 1 tablet by mouth Every 6 (Six) Hours As Needed for Moderate Pain. 30 tablet 0    losartan (Cozaar) 25 MG tablet Take 1 tablet by mouth daily. May increase to Take 2 tablets by mouth Daily if blood pressure at home is greater than 140/90 90 tablet 1    multivitamin with minerals tablet tablet Take 1 tablet by mouth Daily.      promethazine (PHENERGAN) 25 MG tablet Take 1 tablet by mouth Every 6 (Six) Hours As Needed for Nausea or Vomiting. 20 tablet 0     No current facility-administered medications for this visit.     Review of Systems   Constitutional: Negative.    HENT: Negative.     Eyes: Negative.    Respiratory: Negative.     Cardiovascular: Negative.    Gastrointestinal: Negative.    Endocrine: Negative.    Genitourinary: Negative.    Musculoskeletal: Negative.         Left foot pain   Skin: Negative.    Allergic/Immunologic: Negative.    Neurological: Negative.    Hematological: Negative.    Psychiatric/Behavioral: Negative.     All other systems reviewed and are negative.      OBJECTIVE     Vitals:    05/02/25 0751   BP: 169/82   Pulse: 67   SpO2: 96%       WBC   Date Value Ref Range Status   11/25/2024 5.17 3.40 - 10.80 10*3/mm3 Final     RBC   Date Value Ref Range Status   11/25/2024 4.70 3.77 - 5.28 10*6/mm3 Final     Hemoglobin   Date Value Ref Range Status   11/25/2024 13.6 12.0 - 15.9 g/dL Final     Hematocrit   Date Value Ref Range Status   11/25/2024 41.7 34.0 - 46.6 % Final     MCV   Date Value Ref Range Status   11/25/2024 88.7 79.0 - 97.0 fL Final     MCH   Date Value Ref Range Status   11/25/2024 28.9 26.6 - 33.0 pg Final     MCHC   Date Value Ref Range Status   11/25/2024 32.6 31.5 - 35.7 g/dL Final     RDW   Date Value Ref Range Status   11/25/2024 12.9 12.3 - 15.4 % Final     RDW-SD   Date Value Ref  Range Status   11/25/2024 42.5 37.0 - 54.0 fl Final     MPV   Date Value Ref Range Status   11/25/2024 10.3 6.0 - 12.0 fL Final     Platelets   Date Value Ref Range Status   11/25/2024 270 140 - 450 10*3/mm3 Final     Neutrophils Absolute   Date Value Ref Range Status   07/07/2021 2.61 1.70 - 7.00 10*3/mm3 Final   07/07/2021 3.00 1.70 - 7.00 10*3/mm3 Final     Eosinophils Absolute   Date Value Ref Range Status   07/07/2021 0.09 0.00 - 0.40 10*3/mm3 Final   07/07/2021 0.23 0.00 - 0.40 10*3/mm3 Final     Basophils Absolute   Date Value Ref Range Status   07/07/2021 0.14 0.00 - 0.20 10*3/mm3 Final         Lab Results   Component Value Date    GLUCOSE 106 (H) 02/25/2025    BUN 19 02/25/2025    CREATININE 0.73 02/25/2025    EGFRIFNONA 86 07/07/2021    BCR 26.0 (H) 02/25/2025    K 4.4 02/25/2025    CO2 26.3 02/25/2025    CALCIUM 9.6 02/25/2025    ALBUMIN 4.4 02/25/2025    AST 27 02/25/2025    ALT 41 (H) 02/25/2025       Patient seen in no apparent distress.      PHYSICAL EXAM:     Foot/Ankle Exam    GENERAL  Appearance:  appears stated age  Orientation:  AAOx3  Affect:  appropriate  Gait:  unimpaired  Assistance:  independent  Right shoe gear: casual shoe  Left shoe gear: casual shoe    VASCULAR     Right Foot Vascularity   Normal vascular exam    Dorsalis pedis:  2+  Posterior tibial:  2+  Skin temperature:  warm  Edema grading:  None  CFT:  < 3 seconds  Pedal hair growth:  Present  Varicosities:  none     Left Foot Vascularity   Normal vascular exam    Dorsalis pedis:  2+  Posterior tibial:  2+  Skin temperature:  warm  Edema grading:  None  CFT:  < 3 seconds  Pedal hair growth:  Present  Varicosities:  none     NEUROLOGIC     Right Foot Neurologic   Normal sensation    Light touch sensation: normal  Vibratory sensation: normal  Hot/Cold sensation: normal  Protective Sensation using Vanleer-George Monofilament:   Sites intact: 10  Sites tested: 10     Left Foot Neurologic   Normal sensation    Light touch sensation:  normal  Vibratory sensation: normal  Hot/Cold sensation:  normal  Protective Sensation using Redmond-George Monofilament:   Sites intact: 10  Sites tested: 10    MUSCLE STRENGTH     Right Foot Muscle Strength   Foot dorsiflexion:  4  Foot plantar flexion:  4  Foot inversion:  4  Foot eversion:  4     Left Foot Muscle Strength   Foot dorsiflexion:  4  Foot plantar flexion:  4  Foot inversion:  4  Foot eversion:  4    RANGE OF MOTION     Right Foot Range of Motion   Foot and ankle ROM within normal limits       Left Foot Range of Motion   Foot and ankle ROM within normal limits      DERMATOLOGIC      Right Foot Dermatologic   Skin  Right foot skin is intact.      Left Foot Dermatologic   Skin  Left foot skin is intact.      Left foot additional comments: Incision coapted. No acute signs of infection. Calf non tender.      RADIOLOGY:        No results found.    ASSESSMENT/PLAN     Diagnoses and all orders for this visit:    1. Neuritis (Primary)    2. Peroneus longus tendinitis, left          Doing well.  Return to clinic as needed.    Patient to begin stretching exercises and icing in the evening as tolerated. Discussed compression therapy and resting the extremity.  Anti-inflammatory medication to begin taking if okay by PCP.    Comprehensive lower extremity examination and evaluation was performed.    Discussed findings and treatment plan including risks, benefits, and treatment options with patient in detail. Patient agreed with treatment plan.    Medications and allergies reviewed.  Reviewed available lab values along with other pertinent labs.  These were discussed with the patient.    An After Visit Summary was printed and given to the patient at discharge, including (if requested) any available informative/educational handouts regarding diagnosis, treatment, or medications. All questions were answered to patient/family satisfaction. Should symptoms fail to improve or worsen they agree to call or return to  clinic or to go to the Emergency Department. Discussed the importance of following up with any needed screening tests/labs/specialist appointments and any requested follow-up recommended by me today. Importance of maintaining follow-up discussed and patient accepts that missed appointments can delay diagnosis and potentially lead to worsening of conditions.    Return if symptoms worsen or fail to improve., or sooner if acute issues arise.    This document has been electronically signed by Leighton Fletcher DPM on May 2, 2025 08:14 EDT

## 2025-05-20 RX ORDER — FENOFIBRATE 145 MG/1
145 TABLET, FILM COATED ORAL DAILY
Qty: 90 TABLET | Refills: 1 | Status: SHIPPED | OUTPATIENT
Start: 2025-05-20

## 2025-05-30 DIAGNOSIS — I10 PRIMARY HYPERTENSION: ICD-10-CM

## 2025-06-03 RX ORDER — LOSARTAN POTASSIUM 25 MG/1
50 TABLET ORAL DAILY PRN
Qty: 90 TABLET | Refills: 0 | Status: SHIPPED | OUTPATIENT
Start: 2025-06-03

## 2025-06-20 ENCOUNTER — TELEPHONE (OUTPATIENT)
Dept: FAMILY MEDICINE CLINIC | Age: 65
End: 2025-06-20
Payer: COMMERCIAL

## 2025-06-27 ENCOUNTER — LAB (OUTPATIENT)
Dept: LAB | Facility: HOSPITAL | Age: 65
End: 2025-06-27
Payer: COMMERCIAL

## 2025-06-27 DIAGNOSIS — E78.2 MIXED HYPERLIPIDEMIA: ICD-10-CM

## 2025-06-27 LAB
ALBUMIN SERPL-MCNC: 4.3 G/DL (ref 3.5–5.2)
ALBUMIN/GLOB SERPL: 1.7 G/DL
ALP SERPL-CCNC: 47 U/L (ref 39–117)
ALT SERPL W P-5'-P-CCNC: 33 U/L (ref 1–33)
ANION GAP SERPL CALCULATED.3IONS-SCNC: 12.4 MMOL/L (ref 5–15)
AST SERPL-CCNC: 23 U/L (ref 1–32)
BILIRUB SERPL-MCNC: 0.4 MG/DL (ref 0–1.2)
BUN SERPL-MCNC: 12 MG/DL (ref 8–23)
BUN/CREAT SERPL: 15.6 (ref 7–25)
CALCIUM SPEC-SCNC: 9.4 MG/DL (ref 8.6–10.5)
CHLORIDE SERPL-SCNC: 107 MMOL/L (ref 98–107)
CHOLEST SERPL-MCNC: 155 MG/DL (ref 0–200)
CO2 SERPL-SCNC: 24.6 MMOL/L (ref 22–29)
CREAT SERPL-MCNC: 0.77 MG/DL (ref 0.57–1)
DEPRECATED RDW RBC AUTO: 42.4 FL (ref 37–54)
EGFRCR SERPLBLD CKD-EPI 2021: 86.8 ML/MIN/1.73
ERYTHROCYTE [DISTWIDTH] IN BLOOD BY AUTOMATED COUNT: 12.7 % (ref 12.3–15.4)
GLOBULIN UR ELPH-MCNC: 2.6 GM/DL
GLUCOSE SERPL-MCNC: 105 MG/DL (ref 65–99)
HCT VFR BLD AUTO: 41.1 % (ref 34–46.6)
HDLC SERPL-MCNC: 59 MG/DL (ref 40–60)
HGB BLD-MCNC: 13.3 G/DL (ref 12–15.9)
LDLC SERPL CALC-MCNC: 80 MG/DL (ref 0–100)
LDLC/HDLC SERPL: 1.34 {RATIO}
MCH RBC QN AUTO: 29.3 PG (ref 26.6–33)
MCHC RBC AUTO-ENTMCNC: 32.4 G/DL (ref 31.5–35.7)
MCV RBC AUTO: 90.5 FL (ref 79–97)
PLATELET # BLD AUTO: 262 10*3/MM3 (ref 140–450)
PMV BLD AUTO: 10.6 FL (ref 6–12)
POTASSIUM SERPL-SCNC: 4.1 MMOL/L (ref 3.5–5.2)
PROT SERPL-MCNC: 6.9 G/DL (ref 6–8.5)
RBC # BLD AUTO: 4.54 10*6/MM3 (ref 3.77–5.28)
SODIUM SERPL-SCNC: 144 MMOL/L (ref 136–145)
TRIGL SERPL-MCNC: 84 MG/DL (ref 0–150)
VLDLC SERPL-MCNC: 16 MG/DL (ref 5–40)
WBC NRBC COR # BLD AUTO: 4.95 10*3/MM3 (ref 3.4–10.8)

## 2025-06-27 PROCEDURE — 80061 LIPID PANEL: CPT

## 2025-06-27 PROCEDURE — 36415 COLL VENOUS BLD VENIPUNCTURE: CPT

## 2025-06-27 PROCEDURE — 80053 COMPREHEN METABOLIC PANEL: CPT

## 2025-06-27 PROCEDURE — 85027 COMPLETE CBC AUTOMATED: CPT

## 2025-06-30 ENCOUNTER — PREP FOR SURGERY (OUTPATIENT)
Dept: SURGERY | Facility: SURGERY CENTER | Age: 65
End: 2025-06-30
Payer: COMMERCIAL

## 2025-06-30 DIAGNOSIS — Z12.11 ENCOUNTER FOR SCREENING COLONOSCOPY: Primary | ICD-10-CM

## 2025-06-30 RX ORDER — SODIUM CHLORIDE 0.9 % (FLUSH) 0.9 %
3 SYRINGE (ML) INJECTION EVERY 12 HOURS SCHEDULED
OUTPATIENT
Start: 2025-06-30

## 2025-06-30 RX ORDER — SODIUM CHLORIDE 0.9 % (FLUSH) 0.9 %
10 SYRINGE (ML) INJECTION AS NEEDED
OUTPATIENT
Start: 2025-06-30

## 2025-06-30 RX ORDER — SODIUM CHLORIDE, SODIUM LACTATE, POTASSIUM CHLORIDE, CALCIUM CHLORIDE 600; 310; 30; 20 MG/100ML; MG/100ML; MG/100ML; MG/100ML
30 INJECTION, SOLUTION INTRAVENOUS CONTINUOUS PRN
OUTPATIENT
Start: 2025-06-30 | End: 2025-07-01

## 2025-07-03 ENCOUNTER — TELEMEDICINE (OUTPATIENT)
Dept: FAMILY MEDICINE CLINIC | Age: 65
End: 2025-07-03
Payer: COMMERCIAL

## 2025-07-03 VITALS
WEIGHT: 278 LBS | DIASTOLIC BLOOD PRESSURE: 80 MMHG | SYSTOLIC BLOOD PRESSURE: 140 MMHG | HEIGHT: 65 IN | BODY MASS INDEX: 46.32 KG/M2

## 2025-07-03 DIAGNOSIS — J01.00 ACUTE NON-RECURRENT MAXILLARY SINUSITIS: ICD-10-CM

## 2025-07-03 DIAGNOSIS — E78.2 MIXED HYPERLIPIDEMIA: ICD-10-CM

## 2025-07-03 DIAGNOSIS — Z12.31 SCREENING MAMMOGRAM FOR BREAST CANCER: ICD-10-CM

## 2025-07-03 DIAGNOSIS — I10 PRIMARY HYPERTENSION: Primary | ICD-10-CM

## 2025-07-03 DIAGNOSIS — J45.20 MILD INTERMITTENT ASTHMA WITHOUT COMPLICATION: ICD-10-CM

## 2025-07-03 RX ORDER — AZITHROMYCIN 250 MG/1
TABLET, FILM COATED ORAL
Qty: 6 TABLET | Refills: 0 | Status: SHIPPED | OUTPATIENT
Start: 2025-07-03

## 2025-07-03 RX ORDER — ALBUTEROL SULFATE 90 UG/1
2 INHALANT RESPIRATORY (INHALATION) EVERY 4 HOURS PRN
Qty: 6.7 G | Refills: 1 | Status: SHIPPED | OUTPATIENT
Start: 2025-07-03

## 2025-07-03 NOTE — PROGRESS NOTES
Radha Herr presents to [facility].  Patient is being seen, via telehealth, and pt is not in the office.  Mychart or doximetry was used for this visit. Radha and I were able to hear each other simultaneously in real time. I introduced myself and verified Radha identity. I explained how the telemedicine visit will occur. Radha is aware they may terminate the telemedicine encounter and withdraw his/her consent for receiving care via telemedicine without affecting their ability to receive future care from us, and that I may also terminate the telemedicine encounter if I determine that an in-person visit is more appropriate for the condition[s] for which treatment is sought.  Radha verbally gave consent for the use of telemedicine in her care. Radha is also aware that this visit will be charged as a regular OV which may require a copay     Chief Complaint:  BP and cholesterol follow up    Subjective     History of Present Illness:  Hypertension  Associated symptoms: no blurred vision, no chest pain, no palpitations, no shortness of breath and no dizziness    Hyperlipidemia  Pertinent negatives include no chest pain or shortness of breath.   Sinus Problem  Associated symptoms include congestion, coughing and sinus pressure. Pertinent negatives include no chills, ear pain, shortness of breath or sore throat.   Cough  Associated symptoms: rhinorrhea    Associated symptoms: no chest pain, no chills, no ear pain, no fever, no rash, no shortness of breath, no sore throat and no wheezing        She presents with chronic hypertension with ongoing borderline blood pressures, on losartan 25 mg daily and tolerates well.  Blood pressures are consistently in the 140s/80s.  Off HCTZ because it made her heart race  She has lost 12 #s and is doing really well, walking 90531 steps a day.  She is to avoid sodium in her diet      She presents with chronic hypercholesterolemia, started on crestor  and it caused body aches so she stopped this med, now on Tricor and her blood pressure and triglycerides are beautiful.  She tolerates med well.  We discussed possibly stopping the medicine but will continue at this time.  She is also trying to follow a low-cholesterol healthy diet.      She presents with chronic intermittent asthma, stable on breo as needed.  She is aware that this is not a rescue inhaler and she needs to use her albuterol when she has acute shortness of air or wheezing.  She has chronic allergies and suffers from a daily chronic cough, she is on xyzal her allergies and sinus  X over-the-counter.  Today she is complaining of a lot of sinus pain.  She gets intermittent sinus infections with her chronic allergies.  She is having a lot of pressure in the right frontal and maxillary lobe.  She has pain when she bends over.  She does not have any tooth pain.  Her drainage is yellow and thick.  She denies fever/chills/nausea/vomiting/ear pain.  She does have a mild sore throat with a lot of postnasal drip.  In the past she has tried and failed flonase (nose bleeds)/singulair/allegra/claritan in past     Of note, she has H/O chronic bladder leakage and incontinence, tried kegel exercises without improvement, she is s/p 2 C sections and hysterectomy and oophrectomy in past.  Not currently on any medications.    Result Review   The following data was reviewed by Swetha Krishna MD on 07/03/2025.  Lab Results   Component Value Date    WBC 4.95 06/27/2025    HGB 13.3 06/27/2025    HCT 41.1 06/27/2025    MCV 90.5 06/27/2025     06/27/2025     Lab Results   Component Value Date    GLUCOSE 105 (H) 06/27/2025    BUN 12.0 06/27/2025    CREATININE 0.77 06/27/2025     06/27/2025    K 4.1 06/27/2025     06/27/2025    CALCIUM 9.4 06/27/2025    PROTEINTOT 6.9 06/27/2025    ALBUMIN 4.3 06/27/2025    ALT 33 06/27/2025    AST 23 06/27/2025    ALKPHOS 47 06/27/2025    BILITOT 0.4 06/27/2025    GLOB  "2.6 06/27/2025    AGRATIO 1.7 06/27/2025    BCR 15.6 06/27/2025    ANIONGAP 12.4 06/27/2025    EGFR 86.8 06/27/2025     Lab Results   Component Value Date    CHOL 155 06/27/2025    TRIG 84 06/27/2025    HDL 59 06/27/2025    LDL 80 06/27/2025     Lab Results   Component Value Date    TSH 1.520 07/07/2021     No results found for: \"HGBA1C\"  No results found for: \"PSA\"  No results found for: \"IRON\"   Lab Results   Component Value Date    LJGZ88CQ 42.2 10/20/2021               Assessment and Plan:   Assessment & Plan  Primary hypertension  Blood pressure is well controlled on current medication and treatment plan, tolerates meds well, no changes are needed to current meds or tx plan, will check appropriate labs today.  Encourage healthy diet and daily exercise         Mixed hyperlipidemia  Cholesterol and triglycerides are  well controlled on current medication and treatment plan, tolerates meds well, no changes are needed to current meds or tx plan, will check appropriate labs today.  Encourage healthy diet and daily exercise         Mild intermittent asthma without complication  Stable with no acute issues.  She is on Breo.  Will go ahead and refill her rescue inhaler today               Acute non-recurrent maxillary sinusitis  Will treat with a Z-Zeke.  She is to continue her current allergy medications  Orders:    azithromycin (ZITHROMAX) 250 MG tablet; Take 2 tablets by mouth the first day, then 1 tablet daily for 4 days.    Screening mammogram for breast cancer    Orders:    Mammo Screening Digital Tomosynthesis Bilateral With CAD; Future    In addition we discussed immunizations and she defers all immunizations today          Objective     Medications:  Current Outpatient Medications   Medication Instructions    albuterol sulfate  (90 Base) MCG/ACT inhaler 2 puffs, Inhalation, Every 4 Hours PRN    azithromycin (ZITHROMAX) 250 MG tablet Take 2 tablets by mouth the first day, then 1 tablet daily for 4 days. " "   fenofibrate (TRICOR) 145 mg, Oral, Daily    Fluticasone Furoate-Vilanterol (BREO ELLIPTA) 200-25 MCG/ACT inhaler Inhale 1 puff by mouth Daily.    gabapentin (Neurontin) 300 MG capsule Take 1 capsule by mouth Daily    HYDROcodone-acetaminophen (Norco) 7.5-325 MG per tablet 1 tablet, Oral, Every 6 Hours PRN    losartan (Cozaar) 25 MG tablet Take 1 tablet by mouth daily. May increase to Take 2 tablets by mouth Daily if blood pressure at home is greater than 140/90    multivitamin with minerals tablet tablet 1 tablet, Daily        Vital Signs:   /80   Ht 165.1 cm (65\")   Wt 126 kg (278 lb) Comment: Used Pt's wt from previous OV  BMI 46.26 kg/m²     Class 3 Severe Obesity (BMI >=40). Obesity-related health conditions include the following: hypertension and dyslipidemias. Obesity is improving with lifestyle modifications. BMI is is above average; BMI management plan is completed. We discussed portion control and increasing exercise.     BP Readings from Last 3 Encounters:   07/03/25 140/80   05/02/25 169/82   03/03/25 117/72      Wt Readings from Last 3 Encounters:   07/03/25 126 kg (278 lb)   05/02/25 126 kg (278 lb)   03/03/25 128 kg (282 lb)        Physical Exam:  Physical Exam  Vitals reviewed.   Constitutional:       General: She is not in acute distress.     Appearance: Normal appearance. She is normal weight. She is not ill-appearing.   HENT:      Head: Normocephalic and atraumatic.   Eyes:      Extraocular Movements: Extraocular movements intact.      Pupils: Pupils are equal, round, and reactive to light.   Pulmonary:      Effort: Pulmonary effort is normal.   Neurological:      General: No focal deficit present.      Mental Status: She is alert and oriented to person, place, and time.   Psychiatric:         Mood and Affect: Mood normal.         Behavior: Behavior normal.         Thought Content: Thought content normal.         Judgment: Judgment normal.           Review of Systems:  Review of Systems "   Constitutional:  Negative for chills, fatigue and fever.   HENT:  Positive for congestion, rhinorrhea and sinus pressure. Negative for ear pain and sore throat.    Eyes:  Negative for blurred vision and double vision.   Respiratory:  Positive for cough. Negative for shortness of breath and wheezing.    Cardiovascular:  Negative for chest pain and palpitations.   Gastrointestinal:  Negative for abdominal pain, blood in stool, constipation, diarrhea, nausea and vomiting.   Skin:  Negative for rash.   Neurological:  Negative for dizziness and headache.   Psychiatric/Behavioral:  Negative for sleep disturbance, suicidal ideas and depressed mood. The patient is not nervous/anxious.               Follow Up   No follow-ups on file.    Part of this note may be an electronic transcription/translation of spoken language to printed   text using the Dragon Dictation System.              Health Maintenance   Topic Date Due    MAMMOGRAM  06/05/2025    COLORECTAL CANCER SCREENING  09/15/2025 (Originally 11/10/2006)    INFLUENZA VACCINE  12/30/2025 (Originally 7/1/2025)    Pneumococcal Vaccine 50+ (1 of 2 - PCV) 12/30/2025 (Originally 11/10/1980)    TDAP/TD VACCINES (1 - Tdap) 12/30/2025 (Originally 11/10/1980)    ZOSTER VACCINE (1 of 2) 12/30/2025 (Originally 11/10/2011)    COVID-19 Vaccine (3 - 2024-25 season) 01/03/2026 (Originally 9/1/2024)    ANNUAL PHYSICAL  12/02/2025    LIPID PANEL  06/27/2026    HEPATITIS C SCREENING  Completed          Medical History:  Medications Discontinued During This Encounter   Medication Reason    promethazine (PHENERGAN) 25 MG tablet *Therapy completed    albuterol sulfate  (90 Base) MCG/ACT inhaler *Therapy completed      Past Medical History:    Allergic    Dust, pollen, mold, grass clippings, trees    Allergic rhinitis    Arthritis    Shoulders    Asymptomatic menopausal state    Closed right ankle fracture    Clotting disorder    Not enough skin covering my membranes, NO MEDS     COVID-19    Difficulty walking    only because of injury    Diverticulosis    Essential (primary) hypertension    Headache    History of fusion of cervical spine    C4-5 FULL EXTENSION    History of medical problems    had varithena vein surgery    Injury of neck    I had a neck injury on  where I hernated a disk and had a fusion    Mild intermittent asthma    allergy induced, uses an inhaler 1x day    Other dorsalgia    Overflow incontinence    Peroneus longus tendinitis    Pneumonia    Covid    PONV (postoperative nausea and vomiting)    Sprain of anterior talofibular ligament of left ankle     Past Surgical History:    BILATERAL BREAST REDUCTION    BREAST SURGERY    CERVICAL FUSION    C4/5     SECTION    X2    COLONOSCOPY    CYST REMOVAL    Procedure: left foot nerve cecompression, tendon repair left foot;  Surgeon: Leighton Fletcher DPM;  Location: [facility] OR [facility] [place];  Service: Podiatry;  Laterality: Left;    FOOT SURGERY    Ankle tendon nerve repair vein rerouted removal of scar tissue    FRACTURE SURGERY    Left ankle surgery    HYSTERECTOMY    NASAL SEPTUM SURGERY    SINUS SURGERY    Deviated septum    VARICOSE VEIN SURGERY      Family History   Problem Relation Age of Onset    Cancer Mother             COPD Mother             Hypertension Mother             Heart attack Father     Coronary artery disease Father     Hyperlipidemia Father             Hypertension Father             COPD Father             Stroke Father     Depression Father     Mental illness Father     Diabetes Maternal Grandmother             Stroke Maternal Grandfather             Malig Hyperthermia Neg Hx      Social History     Tobacco Use    Smoking status: Never     Passive exposure: Never    Smokeless tobacco: Never   Substance Use Topics    Alcohol use: Not Currently       Health Maintenance Due   Topic Date Due    MAMMOGRAM  2025        Immunization  History   Administered Date(s) Administered    COVID-19 (PFIZER) Purple Cap Monovalent 06/15/2021, 07/13/2021       Allergies   Allergen Reactions    Acetaminophen-Codeine Other (See Comments)     GIVES ME NIGHTMARES AND HARD TO WAKE UP    Tylenol [Acetaminophen] Other (See Comments)     GIVES ME NIGHTMARES AND HARD TO WAKE UP    Statins Myalgia

## 2025-07-03 NOTE — ASSESSMENT & PLAN NOTE
Blood pressure is well controlled on current medication and treatment plan, tolerates meds well, no changes are needed to current meds or tx plan, will check appropriate labs today.  Encourage healthy diet and daily exercise

## 2025-07-03 NOTE — ASSESSMENT & PLAN NOTE
Cholesterol and triglycerides are  well controlled on current medication and treatment plan, tolerates meds well, no changes are needed to current meds or tx plan, will check appropriate labs today.  Encourage healthy diet and daily exercise

## 2025-08-04 ENCOUNTER — HOSPITAL ENCOUNTER (OUTPATIENT)
Dept: MAMMOGRAPHY | Facility: HOSPITAL | Age: 65
Discharge: HOME | End: 2025-08-04
Admitting: FAMILY MEDICINE
Payer: COMMERCIAL

## 2025-08-04 DIAGNOSIS — Z12.31 SCREENING MAMMOGRAM FOR BREAST CANCER: ICD-10-CM

## 2025-08-04 PROCEDURE — 77063 BREAST TOMOSYNTHESIS BI: CPT

## 2025-08-04 PROCEDURE — 77067 SCR MAMMO BI INCL CAD: CPT

## (undated) DEVICE — THE STERILE LIGHT HANDLE COVER IS USED WITH STERIS SURGICAL LIGHTING AND VISUALIZATION SYSTEMS.

## (undated) DEVICE — SYR LL TP 10ML STRL

## (undated) DEVICE — STERILE POLYISOPRENE POWDER-FREE SURGICAL GLOVES WITH EMOLLIENT COATING: Brand: PROTEXIS

## (undated) DEVICE — NDL HYPO PRECISIONGLIDE REG 22G 1 1/2

## (undated) DEVICE — BNDG ESMARK 4IN 12FT LF STRL BLU

## (undated) DEVICE — GOWN,REINFORCE,POLY,SIRUS,BREATH SLV,XLG: Brand: MEDLINE

## (undated) DEVICE — EXTREMITY-LF: Brand: MEDLINE INDUSTRIES, INC.

## (undated) DEVICE — BNDG ELAS ECON W/CLIP 4IN 5YD LF STRL

## (undated) DEVICE — GAUZE,SPONGE,4"X4",16PLY,STRL,LF,10/TRAY: Brand: MEDLINE

## (undated) DEVICE — PCH INST SURG INVISISHIELD 2PCKT

## (undated) DEVICE — PENCL SMOKE/EVAC MEGADYNE TELESCP 10FT

## (undated) DEVICE — GLV SURG SENSICARE PI ORTHO SZ8 LF STRL

## (undated) DEVICE — GLV SURG SENSICARE PI LF PF 7.5 GRN STRL

## (undated) DEVICE — DRESSING,GAUZE,XEROFORM,CURAD,1"X8",ST: Brand: CURAD

## (undated) DEVICE — INTENDED FOR TISSUE SEPARATION, AND OTHER PROCEDURES THAT REQUIRE A SHARP SURGICAL BLADE TO PUNCTURE OR CUT.: Brand: BARD-PARKER ® CARBON RIB-BACK BLADES

## (undated) DEVICE — DISPOSABLE TOURNIQUET CUFF SINGLE BLADDER, SINGLE PORT AND QUICK CONNECT CONNECTOR: Brand: COLOR CUFF

## (undated) DEVICE — APPL CHLORAPREP HI/LITE 26ML ORNG

## (undated) DEVICE — BANDAGE,GAUZE,BULKEE II,4.5"X4.1YD,STRL: Brand: MEDLINE